# Patient Record
Sex: MALE | Race: WHITE | NOT HISPANIC OR LATINO | URBAN - METROPOLITAN AREA
[De-identification: names, ages, dates, MRNs, and addresses within clinical notes are randomized per-mention and may not be internally consistent; named-entity substitution may affect disease eponyms.]

---

## 2024-06-05 ENCOUNTER — VIRTUAL VISIT (OUTPATIENT)
Dept: PSYCHIATRY | Facility: CLINIC | Age: 19
End: 2024-06-05
Payer: COMMERCIAL

## 2024-06-05 DIAGNOSIS — F29 PSYCHOSIS, UNSPECIFIED PSYCHOSIS TYPE (H): Primary | ICD-10-CM

## 2024-06-05 NOTE — PROGRESS NOTES
[facility] Clinician Phone Screen  A Part of the [facility] First Episode of Psychosis Program    Patient: Yoseph Grayson (2005, 18 year old)     MRN: [id number]  Date:  6/05/24  Clinician: OMID Darden     Length of Actual Contact: Start Time: 3:39; End Time: 4:10    Use the following script to set-up intentions for this appointment:    You may have heard this when you scheduled this phone call but as a reminder, this 30 minute appointment is used to review a few questions to determine if you would be a candidate for our First Episode Psychosis Programs assessment process. Our assessment process includes 2-3 additional  appointments, spread out over several weeks. Eligibility for enrollment and our treatment recommendations will be discussed after those appointments. By the end of the phone call, I hope to determine if you/Pt is eligible for the full assessment appointment process, which we will schedule at the end of this call. This is not an intake and enrollment is not guaranteed.     We also want to be  transparent that start dates may vary depending on eligibility and program availability.   With knowing this information, do you still want to proceed with this phone screen? Yes    Reviewed limits to confidentiality: Yes    Use the following script to describe limits to confidentiality if meeting with the patient/family:   Before we get started I always like to review confidentiality. All of the information you share will be kept confidential. Only with permission will information be released to anyone outside of the organization except when required by law. Those legal exceptions are if there is clear and imminent danger to you or someone else, if there is a reasonable suspicion that child or elder is being abused, or if there is a court order. Do you have any questions about confidentiality before we get started?    Phone screen completed with:  Yoseph; Relation to the patient: Self  If we move  "forward with scheduling appointments, who should we coordinate appointments with? Yoseph, Phone: [phone]   Is it OK to leave a detailed voicemail? Yes  If we move forward with scheduling appointments via video, where would you like the link sent? [email or web address]    Demographics:   What is patient's phone number: [phone] (home)   Patient's Address?  [address] [address]  Patient's Email Address?  [email or web address]    If caller is not the patient, is the patient aware of the referral?  N/A    If age 18 or older, does the adult patient consent to this referral and is the patient willing to attend appointments? Yes    Diagnostic Information:  Briefly, in a few sentences, what would you/the patient like to be seen for?  \"I want to learn about what happened to me. I went through psychosis and was in a joanna.\"    Have you/the patient experienced symptoms of psychosis? Yes  If yes, for how long and please describe? \"Last month in May.\"  In particular, are you/the patient experiencing/have experienced any of the following?   -Changes in thinking (odd ideas, grandiosity, suspiciousness, difficulty concentrating): Yes \"I was paranoid and had weird ideas.\"   -Changes in perception (auditory/visual/tactile/olfactory abnormalities): Yes \"I thought that I could communicate with people telepathetically.\"  -Changes in speech (disorganized communication, tangential speech): No  -Emotional changes (depression, mood swings, irritability, flat affect): Yes  -Dramatic reduction of overall functioning: Yes    What mental health diagnosis(es) have you/the patient received in the past?   Bipolar Disorder  Depression    In particular, have you/the patient ever been diagnosed with:   -Autism spectrum disorder: No   -Borderline personality disorder: No    Any history of developmental delays?  No    If yes, please describe:     Are any of the following applicable to the patient?   -IQ below 70?  " "\"I've not had it tested.\"  -Non-verbal due to developmental delays? No  -Living in a group home because of developmental disability? No  -Has a guardian because of a developmental disability? No    Service History:   Are you/the patient taking antipsychotics or have you/the patient taken antipsychotics in the past? Yes            If yes, for how long cumulatively? Olanzapine- started last month    Are you/the patient seeing any mental health providers currently? Yes              If yes, who/where? Therapist- Marilyn Kirkpatrick; Psychiatrist- Dr. KYRA [facility]    Specifically, have you/the patient had an ACT team in the past?  No    Have you been enrolled in a first episode psychosis outpatient program before, such as Navigate or Strengths here, HOPE Program at [facility], or at the [facility] in [place]? No    Any current thoughts of harming yourself or others? Yes  Last few days SI- no plan, no intent; safety plan    What services are you/the patient interested in receiving in our clinics?   Medication management: Yes   Individual therapy: Yes   Family therapy: Yes   Group therapy: Yes   Work/school support: Yes    Research:  If talking with the patient directly, would you be interested in learning more about research opportunities you may qualify? If so, we can connect you with a team member for more information. No      Plan:  Is this patient eligible for a comprehensive assessment with First Episode of Psychosis Services? Yes    Yoseph would be eligible; however, he would like to get started with therapy as soon as possible and is not interested in waiting, due to going to school in August. This writer will send him a list of community psychosis providers.     Melody Hameed,  Thank you for talking with me today and for sharing your experiences. I'm sorry the timeline isn't going to work out for our programs, but I'm hopeful you will find another that will fit your needs. Below is a " "list of groups, individual therapists, and some reading resources for you to look through.    Psychosis Literature:  -YEVGENIY's Understanding Psychosis: Resources and Literature  https://6y650g02ecen8kltqf5y67zh-ylxghbju.ApplyInc.com/wp-content/uploads/sites/188/2020/07/YEVGENIY-TokyohxailowfHztubjylbYozbjgl9363.pdf     -Early Psychosis Intervention and other downloads  https://www.earlypsychosis.ca/resources-and-downloads/    Specifically, \"When Someone in Your Family has Psychosis\"      https://www.earlypsychosis.ca/downloads/1-EPI-Coping.pdf     -\"I'm Not Sick, I Don't Need Help.\" Book by Gato Parks  https://www.yevgeniy.org/getattachment/Learn-More/Mental-Health-Conditions/Related-Conditions/Anosognosia/I_am_not_sick_excerpt.pdf?lang=en-US     Computer Based Cognitive Remediation:  -BrainHQ, http://www.brainRemotemedical.Tranzlogic/  -Scientific Brain Training Bro, http://www.PeekapaktraAscentispro.Tranzlogic/  -Psychber Guide, http://psyberguide.org/, a consumer resource for mental health technology    Psychosis Groups & Other Support for Family & Caregivers:  -[facility] Youth and Parent , Rashmi Heredia, [facility]  [phone] ext [phone]    -[facility], Psychosis Family Education Group  Most Tuesdays 11:30-1  Please call [phone] for more information or with request to be added to the confidential email distribution list    -[facility] Caregiver & Family Support Group with emphasis on first episode of psychosis  2nd & 4th Monday of the month from 6:00-7:30pm  For more information please call Rashmi Heredia at [phone] ext [phone]    -FREE YEVGENIY classes (variety of topics, check back often)  (Understanding early psychosis, Hope for Recovery, Family to Family, etc)  https://[facility].org/education-and-public-awareness/classes/nlscc-wableg-ubz-education-and-public/-awareness-classes-all-scheduled-classes/    -YEVGENIY GORDON Family Online Support " Group  https://namimn.org/support/[facility]-support-groups/  For more information contact YEVGENIY at [phone]    And also a few webinars on the topic of supporting loved ones with psychosis to give you communication tools:  - https://uwspiritlab.org/family-caregiver-support-programs/  - https://AnSyn[facility] Medical Centerma.org/what-can-i-do/the-school-of-hard-talks-online-lessons-from-motivational-interviewing-for-everyday-families    Psychosis Groups for Individuals Experiencing or who Experienced Psychosis:  -[facility], Young Adult psychosis group  Thursday 3-4pm (virtually) [phone] to be scheduled    -Hearing Voices - Neibert Eldarion is a peer support group for anyone who experiences voice hearing, visions and other unusual or extreme experiences. Our group provides an opportunity to talk about these experiences freely and without judgment. We encourage anyone who has had these experiences to explore, understand, learn and grow from them in their own way.  https://hearingvoicestwincities.org   The group meets every Monday at 7:00 pm in the Meditation room at [facility] in [place]. Everyone is welcome.     -[facility] Vision of Wellness  [phone]   https://www.s-dbt.com/mental-health/vision-wellness-program    -[facility] Lili Jenkins PsyD, LP   Group therapy (http://www.psychrecoveryinc.com/group.html) for psychosis and schizophrenia   Address: [address] [place] [address] [address], [place], [place] [address]   Phone: [phone]     -[facility]: Young Adult Psychosis Intensive Outpatient Program (IOP)   Referral Form: [email or web address]://[email or web address]  Young Adult Psychosis - IOP is a 9-week intensive outpatient program for adults experiencing their first episode of psychosis. This program utilizes julien concepts from the NAVIGATE program, YEVGENIY, Sheron, and other evidence-based treatment modalities to work towards recovery after an  initial episode of psychosis. Young Adult Psychosis IOP focuses on reduction in symptoms, reduction in substance use, healthy lifestyle choices, and stress management. Clients use these skills to work towards individual goals, often returning to work or school and improving overall functioning. Our IOP program meets 4 days per week from 1:00 pm- 4:00 pm (Tuesday, Wednesday, Thursday, and Friday). In addition, clients participate in Individual therapy sessions on Mondays. This IOP is located at our [facility]. If you have any questions, please call [phone] Option 4 between the hours of 8:00am and 4:30pm CST Monday through Friday.    -Mental Health Resources  [place] Community Support Program (CSP) Drop in Team  Address: [address] [place], [place]  Phone: [phone]  http://www.[facility].org/[place]-community-support-program    -[facility] Support Groups for Individuals  Living with Mental Illness  https://[facility].org/support/support-groups-adults-living-mental-illness/  For more information contact YEVGENIY at [phone]      Psychosis Individual Therapy Referral(s):  Laverne Lane McMahon, [facility]  Address: [address] [place] [address], 2nd Floor, [place] [address]  Phone: [phone]    [facility]   Bin Martin [facility]  Julianne Ovalle MS, [facility]   Address: [address], [address] [address]   Phone: [phone]   *Updated 1/15/24     [facility]   Haydee Krishnan MA, JATIN Ricci, PhD, LP   Mary Colbert MA, LP   Julio Hernandez Psy.D., DAVID Ricci, Ph.D., LP    Address: [address] [address]   Phone: [phone]   *Updated 1/15/24 [facility]   Elmira Abernathy MA, [facility]  Brenden Reynolds MA, JATIN  Address: [address], [address], [place] [address]   Phone: [phone]   Website: [email or web address]   *Updated  1/15/24     [facility]  Ethel Mitzimaximo, [facility]   [email or web address]  Clinical Supervisor/Therapist   Direct #: [phone]   Fax #: [phone] [address], [address]  [place]. [address]  [email or web address]  *Updated 1/15/24    [facility], Behavioral Health Services   Website: http://[email or web address]/   To schedule your first appointment call [phone]   Locations/Phones:   - [place], Phone: [phone]   - [place], Phone: [phone]   - [place], Phone: [phone]   - [facility], Phone: [phone]   - [place], Phone: [phone]   [facility], Phone: [phone]     [facility]  Romy Stevens, LMFT  Daysi Larios, [facility]  [address] [place] [id number] [phone]  [email or web address]    [facility]  Bethel Szymanski, MS, LMFT   [address] [place] [address] [phone]  *Updated 1/15/24     Bin Warren  Marriage & Family Therapist, MA, LMFT  [address], [place] [address] [phone]  *Updated 1/15/24 [facility]  [phone]  Gigi Lassiter, PhD  Gabrielle Hernández, [facility]  Yancy Herbert LGTOM  [email or web address]/team-[email or web address]  [email or web address]://[email or web address]/team-[email or web address]    [facility] (need a /[facility] PCP)   Jeni Townsend PsyD,    Address: [place] [address], [address] [place] [address]   Phone: [phone]     [facility]   Damaris Dove, [facility]  [address], [address]  [place] [address]  Phone: [phone]  *Updated 1/15/24     [facility]   Address: [address] [facility], [place] [address]   Phone: [phone]   *Updated 1/15/24 [facility]   Address: [address] [place] [address], [place] [address]  Phone: [phone]  *Updated 1/15/24 [facility]    Romy Goff PsyD   *Children and adolescent   Address: [address], [place] [address]   Phone: [phone]     [facility] [place]  SABA Peterson  *also provides adherent DBT  Address: [address] [place], [place] [address]  Phone: [phone]  *Updated 1/15/24     Terry Padgett [facility]   *Individual and family   Location: [place], [place]   Phone: [phone]   *Updated 1/15/24 [facility] for Personal and Family Development   Xiomy Sharma [facility]  Location: [place], [place]   Main Phone: [phone]   Fee: accept most insurance plans   Website: https://www.[email or web address]/   *Updated 1/15/24 [facility], [facility]  Kapil Lowe, Psychologist, PhD, , [place] [address] [phone]  https://www.[facility].org/  *Updated 1/15/24 [facility]   Address: [address] [place] [address]   Phone: [phone]   *LISA Morejon, [facility] sees patient with SPMI in [place] (P: [phone])     [facility]  Mary Pelaez PsyD, [address], [address]  [place] [address]  Telephone: [phone]  Email: [email or web address]  [email or web address]  *Updated 1/15/24     [facility]  Edward William MA, [facility] sees patients ages 15+ with bipolar and schizophrenia  [address], [address]  [place] [address] [phone]  *Updated 1/15/24     [facility]   Address: [address], [address], [place] [address]   Phone: [phone]   *Updated 1/15/24     [facility]   Rebel Peralta LMFT   *Special interest in Psychosis   *Other therapists at [facility] [place] [facility] in [place] who specialize in psychosis   Address: [address], [address], [place] [address]   Phone: [phone]   *Updated 1/15/24 [facility]  Le FLOWER  Madison Carver, DAVID  [facility] [place] [address]  [address] [place] [place] [address]  Phone: [phone]  *Updated 1/15/24     [facility]   Lili Jenkins PsyD, DAVID   *Offers individual, family, and group therapy (http://www.psychrecoveryinc.com/group.html) for psychosis and schizophrenia   Address: [address] [place] [address], [facility] [address], [place], [place] [address]   Phone: [phone]   *Updated 1/15/24     [facility]  Shelby Oates MA [facility]  [place] [address] [phone]  *Updated 1/15/24 [facility]   Address: [address] [place] [address]   Phone: [phone]   *Updated 1/15/24     [facility]  Reginald Kellogg MA, LMFT  [address]  [place] [address]  [phone]  *Updated 1/15/24     [facility]  Maria R Cam[facility]  [address] [place] [address] [phone]  *Updated 1/15/24        Janneth Lora [facility]

## 2024-10-16 ENCOUNTER — VIRTUAL VISIT (OUTPATIENT)
Dept: PSYCHIATRY | Facility: CLINIC | Age: 19
End: 2024-10-16

## 2024-10-16 DIAGNOSIS — F29 PSYCHOSIS (H): Primary | ICD-10-CM

## 2024-10-17 NOTE — PROGRESS NOTES
Janneth CHAIREZ followed up with Yoseph to see if patient was still interested in moving forward. Phone screen is scheduled.

## 2024-10-22 NOTE — PROGRESS NOTES
This writer and Janneth Allred, , met with Yoseph for a phone screen. Yoseph stated he is taking a gap year from school and would therefore be interested in pursuing a FEP Program at this time. This writer verified his information from the previous phone screen in June 2024. This writer described the assessment process and sent him the following email:    Zina Hameed,     Thank you for your interest in our first episode of psychosis services. As discussed, it seems you might be eligible for one of our first episode of psychosis programs. Therefore, you were offered a series of assessment appointments (details below). Please note, enrollment in one of our first episode psychosis programs is dependent on the outcome of these assessments. These appointments are not considered an intake into a program and enrollment is not guaranteed. Additionally, if eligible there might be wait times for enrollment into our programs. Wait times would be discussed with you during your feedback appointment. If you already have established care with community providers, continue to work with those providers until you have appointments with our program. If you do not have care established elsewhere, please consider establishing care in the interim. If you requested information on other community resources outside of our organization, those are listed below.     First Episode Psychosis Assessment Appointments:     -Diagnostic Assessment appointment with Hanh Wilson and Russ Betancourt on 12/3/24 at 9:00 for 120 minutes in clinic. You will go to [address] [place] [address] in [place]. You can go to the 2nd floor and check in at the .  A diagnostic assessment is a comprehensive structured assessment that is completed with everyone seeking services in our clinic. It helps us get to know people and determine what services might be the best fit. For this appointment you will meet with an experienced clinician  and psychometrist (i.e., someone who administers questionnaires and structured assessments). During this appointment the assessment team will review your social, medical, and psychiatric history.     -Feedback appointment with Hanh Wilson for 30 minutes will be scheduled at your Diagnostic Assessment.   A feedback appointment is where you will hear about professional impressions and treatment recommendations.     In preparation for future appointments we ask that all behavioral health records be faxed to [phone] (for adult patients) / [phone] (for child/adolescent patients).    If you have follow-up questions or need to cancel/reschedule appointments, please contact one of our clinics at [phone] (for adult patients) / [phone] (for child/adolescent patients).    As a reminder, if you need urgent help, please call your local crisis number, 911, or go to your nearest ED. A list of crisis hotlines has been included below. Additionally, we have a new mental health emergency area at [facility] called Min that is very calming and helpful if you need additional support. ([address] [place] [address]  [phone]). This is a team of mental health providers who can assess your needs and connect you with resources and medication in a timely manner and provide transitional support until obtaining a consistent provider ([facility]- [phone]).    Crisis Resources:  Crisis Hotlines:  National Suicide Prevention Lifeline at 988  Throughout  [place]: call **CRISIS (**385368)  Crisis Text Line: is available for free, 24/7 by texting [place] to 693715  With Lifeline Chat as option - connect with a counselor for emotional support and other services via web chat https://suicidepreventionlifeline.org/chat/    [place] (Child & Adult): [phone]   [place] (Child & Adult): [phone]   [place] (Child & Adult): [phone]   [place] - Child: [phone]    [place] - Adult: [phone]  [place] - Child: [phone]   [place] - Adult: [phone]  [place] (Child & Adult): [phone]   [place] (Child & Adult): [phone]  [facility] Crisis Response ([facility] [place] and [facility]): [phone]    [facility] Crisis Services Fact Sheet: https://[facility].org/wp-content/uploads/sites/48/2019/06/Cibbcr-Uoypjo-Qayipodz__4431.06.03.pdf    Behavioral Health Emergency Room  [facility] EmPATH  Phone: [phone] (Emergency Room)  Address: [facility] [place] [place] [address]    [facility] EmPATH (Emergency Psychiatric Assessment, Treatment, and Healing) is like an emergency room mental health unit. EmPATH is an innovative approach to emergency mental health care that is designed to guide people safely through a crisis while helping them build coping skills for the future. The unit is designed for acute psychiatric patients to receive assessment and evaluation in a therapeutic and least restrictive setting.    Complementing the emergency department, the new adult EmPATH unit provides a calm and comforting environment, allowing the movement and human interaction that is vital in the first 24 hours of treatment. After a short medical evaluation in the emergency department, patients come to a calming, living room-style open space with comfortable recliners and self-serve refreshment stations. Open nursing stations and unlocked rooms create an atmosphere of trust and allyship with the staff, while the mix of daylighting, views to nature, or appropriate imagery establishes a sense of hope. In addition to providing a conducive environment for treatment, the EmPATH unit provides a gentle and benign setting for the care team to constantly evaluate a patient and discharge them with an appropriate treatment plan.    Thank you,  Janneth Lora

## 2024-11-10 ENCOUNTER — HEALTH MAINTENANCE LETTER (OUTPATIENT)
Age: 19
End: 2024-11-10

## 2024-12-03 ENCOUNTER — OFFICE VISIT (OUTPATIENT)
Dept: PSYCHIATRY | Facility: CLINIC | Age: 19
End: 2024-12-03
Payer: COMMERCIAL

## 2024-12-03 DIAGNOSIS — F31.9 BIPOLAR 1 DISORDER (H): Primary | ICD-10-CM

## 2024-12-03 NOTE — PROGRESS NOTES
"[facility]  Diagnostic Assessment  A part of the [facility] First Episode of Psychosis Treatment Programs    Yoseph Grayson MRN# [id number]   Age: 19 year old YOB: 2005        In Person visit details:  Date of Evaluation: 12/03/24  Location of Evaluation: [facility]  Start Time: 9:05am; End Time: 10:15am  People present:  Writer, Individual, Others: Hanh WALTER, psychometrist for measures and research      Contributors to the Assessment   Chart Reviewed.   Interview completed with Yoseph.    Diagnostic assessment today was completed by SABA Shepherd    Chief Complaint   Been diagnosed with Bipolar previously and was put on Zoloft triggered manic episode. \"I want to learn about what happened to me. I went through psychosis and was in a joanna.\"     History of Present Illness    Yoseph Grayson is a 19 year old patient who prefers the name Yoseph and uses pronouns he, they. Yoseph presents for evaluation at [facility] for services to treat first episode psychosis.  Discussed limits of confidentiality today and status as a mandated .     Referred by:  Therapist    Patient attended the session alone, patient provided assessment details, they were a good historian.     Yoseph attributes symptoms to \"Bipolar disorder and manic episode\"    Per medical records:   The patient has not had a psychiatric history prior to his freshman year in high school when he had an unusual \"out of body\" feeling.  This occurred in approximately 2019 and was treated with fluoxetine.  He may have taken that for 1 or 2 years but it stopped before graduation.  He graduated from high school in 2022 and attended the [employer] for his freshman year before transferring to the [employer] beginning in the fall 2023.  The patient stated that he had begun smoking cannabis heavily in the summer 2023 and reportedly was continuing to do so this year.   "   Approximately 1 month ago, over spring break, the patient was at home and he was noted to be very tearful.  His father accompanied him back to [place] and the patient was able to see a clinician and was started on sertraline.  He took 25 mg/day for 1 week then increase to 50 mg/day.  Family noted that over the next 2 weeks that he seemed to be back to his old self.     Beginning April 27, the patient had contacted his family late at night and announced that he was chin.  He was described as being hyper and excitable.  He had been posting inappropriate things on social media.  On the morning of the 28th he was described as being irritable towards his parents.  During a class on Tuesday he had a outburst and announced that if he had learned this information earlier that he would not have been repressed.  He reportedly had used edibles containing THC or delta a by his report.      When Darien was brought in for hospitalization the following events lead up to his admission. Darien had become fixated on a fellow student, Cielo and had been sending her text messages stating that her boyfriend was dead and that he had killed him. He varied his responses stating that boyfriend was not dead but was still alive. It was very concerning. He had also defecated on his roommate's bed and the roommate had notified authorities. On the morning of the second the patient talked loudly on Cielo's door and demanded to be let in. She contacted law enforcement and the patient was brought to health services on campus. Family was contacted and the patient was brought to the [facility] and [place]. He was subsequently referred for inpatient evaluation.     When asked about manic episode, Yoseph clearly states that his psychosis symptoms have only happened during his Joanna and they faded prior to his joanna subsiding. Yoseph has never had psychosis apart from his joanna and believes it is connected with his mood.        Social History:    Living situation: Yoseph currently lives in [place] with parents. Yoseph reports home life is going well.  Housing Type: house with parents  Pets at home? No   Guns, weapons, or other means to harm oneself in the home?  Unknown    Relationships: Significant relationships include Parents and friend Brigido. Feels like he is struggling with intimate relationships. Is struggling with loneliness.     Education: Yoseph's highest level of education is high school graduate. Was going to school for Music Education but switched to Elementary Education. Educational goals include finishing elementary education degree and going to school in [place].    Occupation: .  Occupational goals include to get an additional job and to eventually go back to school for elementary education.    Finances: Yoseph is financial supported by Payroll and Family. Patient states he is not good with money.     Spiritual considerations: Patient does not identify with pedro luis community.  Is spiritual but is not as into it as he used to be.    Cultural influences: Yoseph describes their race as white and   [place]. Yoseph's primary spoken language is English. Yoseph identifies their sexual orientation as questioning, and their gender as male. Yoseph prefers he  they pronouns.     Current Stressors: Living at home with parents, feelings of loneliness and depression.    Strengths & Opportunities:  Hobbies and enjoyable activities include Reading and Listening to Music. Spending time outside and playing video games. Likes to read comic books. Self identified strengths are good with people and social. Exercise and nutrition habits include worries about gaining weight from the Wellbutrin.  Coping mechanisms include Meditation, Therapy, and Family.     Legal Hx: Yes: got caught with an edbile and had to do a class online      Trauma and/or Abuse Hx: There are no indications or report of: significant losses, trauma,  abuse or neglect. Issues of possible neglect are not present. Identifies the hospital as a traumatic experience.     Hx: No       Developmental History:   Yoseph was born without pregnancy or delivery complications.  Complications include none.   Yoseph denies in utero substance exposure.   Yoseph  did not meet developmental milestones on time.   Milestones not met include struggled with speech.   Developmental disabilities include: None.    Yoseph did not receive interventions for developmental delays.   Yoseph  did not require an IEP/504 Plan during school.   Interventions include none.          Family History:   Family history of: Mother eating disorder, anxiety and depression  reports history of completed suicides. Uncle completed suicide.    Past Psychiatric History   Past diagnoses: Depression, Anxiety, Bipolar  Past medication trials: Zoloft, Prozac, Wellbutrin    Psychiatric Hospitalizations: One hospitalization in May with Luz Elena  Commitment: No, Current Castillo order: No  History of Electroconvulsive Therapy (ECT) or Transcranial Magnetic Stimulation (TMS): No    Self-Injurious Behavior: Denies  Suicidal Ideation Hx: Yes - has history of ideation but has called suicide hotline  Suicide Attempt- #-:No  Violence/Aggression Hx: No    Outpatient Programs & Services [Psychotherapy, DBT, Day Treatment, Eating Disorder Tx, etc]:   Current:  Currently has an outpatient therapist. Cm Don [facility] PCP at [facility]    Past:  Has had previous therapists.Had previous hospitalization for manic episode.         Past Medical History:    There is no problem list on file for this patient.      Primary Care Physician: Jone Don  Last PCP Appointment Date: 11/06/2024    Medical problems: No  Surgical history: This patient has no significant past surgical history  History of seizures/head trauma/loss of consciousness? Yes Without loss of consciousness, Occurred: 2016, and Treatment received: went to the  hospital  Allergies: No Known Allergies     Substance Use History (review CAGE-AID):   Caffeine: 1-2 cups/day of coffee       Tobacco: none   Age of first tobacco use: No use    Amount of tobacco used per week: No use   Frequency of use over the last 6 months: No use    ETOH: occasional     Age of first alcohol use: Unknown   Number of days patient drank over the last 30 days: None   Number of drinks patient had per day over the last 30 days: 1-2 drinks every 2 weeks   Frequency of use over the last 6 months: Once every 2 weeks    Cannabis: none           Age of first cannabis use: Unknown   Number of days patient used cannabis over the last 30 days: none   Amount of cannabis used per use: none   Frequency of use over the last 6 months:None  Last summer used every night (2023) has insight into Marijuana use and how this potentially could effect psychosis.   Has been abstaining from Marijuana to prevent psychosis.    Other Drugs: none   Age of first other drug use: N/A   Number of days patient used other drugs over the last 30 days: none   Frequency of use over the last 6 months: none    CD treatment hx: Yoseph has not received chemical dependency treatment in the past. Yoseph reports no problems as a result of their drinking / drug use.     Current sober supports include family and friends.    Based on the clinical interview, there are not indications of drug or alcohol abuse. Continue to monitor.   Discussed effect of substance use on overall health and how this may contribute to their mental health symptoms.     Psychiatric Review of Systems (Completed M.I.N.I. for Psychotic Disorders: Yes)     DEPRESSION  Past 2 Weeks:  low mood nearly every day and anhedonia most of the time, appitate increase, sleeping excessively, tired or without energy, feeling worthless and without energy, everyday, Feeling guilty about being depressed, suicidal ideation,  Past Episode:  low mood nearly every day, anhedonia most of the  "time, appetite change (increase), difficulties with sleep, low energy, worthlessness and/or guilt, and suicidal ideation without plan, without intent  Notes: first diagnosed with depression in 2017 with no medication. Started Prozac in 2020. Fall of 2023 stopped prozac. Started Zoloft in 2024 and caused salma. Wellbutrin currently from 4-5 months. Has had periods where he is not depressed for 2 months.     SUICIDALITY: Current (within last month): No, risk Low  -reports 0% in response to \"How likely are to you to try to kill yourself within the next 3 months on a scale from 0-100%?\"  -Never has had a suicide attempt  -denies current SI, denies intent and plan  -denies current SIB/Self Injurious Behavior  -denies current HI  -denies past HI  -See Crisis and safety planning below for more information    SALMA/HYPOMANIA  Current Episode:  none  Past Episode:  elevated mood/energy, need less sleep, pressured speech, racing thoughts, and increased drive  Lasting over 7 days for an episode and hospitalized. 3 hours of sleep a night, maybe less.    PANIC:  none    AGORAPHOBIA:  none    SOCIAL ANXIETY:  marked fear/anxiety in participating in small groups and dating out of fear that he/she will act in a way or show anxiety symptoms that will be negatively evaluated, almost always, and causing clinically significant distress or impairement in social, occupation, or other important areas of functioning     OBSESSIVE-COMPULSIVE:  none    TRAUMA:  none    ALCOHOL & J. NON-ALCOHOL:  See below    PSYCHOSIS:   Present Symptoms:  none  Past Symptoms:  paranoia, delusions, thought broadcasting, mind reading, delusions of control, odd beliefs per family/friends, auditory hallucinations, and visual hallucinations  Onset: May 2024   Examples include:   -Paranoia/Suspiciousness: Thought the Pingpigeon was after him   -Delusions Thoughts: thought that he could fly  -Thought Broadcasting: thought he could talk to them through his mind. " Was actually texting people.  -Mind Reading: yes  -Thought Insertion: reports none  -Delusions of Control: reports ability to read thoughts when in psychosis not current  -Ideas of Reference: none  -AH: Multiple voices talking to each other  -VH: none  -Other Hallucinations: none  -Disorganized Speech: none  -Disorganized Behavior: none  -Cognitive Difficulties: none  -Somatic Complaints: none    EATING DISORDER: none    GENERALIZED ANXIETY:  excessive anxiety or worry about several routine things, most days, feel restless, keyed up or on edge, easily tired, weak or exhausted, irritability, and difficulty sleeping    RULE OUT MEDICAL, ORGANIC OR DRUG CAUSES FOR ALL DISORDERS  During any current disorder or past mood episode, patient reports:  A. Substance use or withdrawal: No  B. Medical illness: No    ANTISOCIAL PERSONALITY:  none   Other Cluster B Traits:  none         Medications:   Per chart:  Current Outpatient Medications   Medication Sig Dispense Refill    NO ACTIVE MEDICATIONS       penicillin V potassium (VEETID) 250 MG/5ML suspension Take  by mouth 4 times daily. 375mg four times daily for 10 days 400 mL 0       Most Recent Labs & Vitals (per EPIC):   There were no vitals taken for this visit.    RECENT BRAIN IMAGING:  No recent brain imaging was completed.    Additional Screening / Assessment Measures   PHQ9 was completed today, 12/03/24  GAD7 was completed today, 12/03/24  CAGE-AID was not completed today, 12/03/24  None of the patient's responses to the CAGE screening were positive / Negative CAGE score     Mental Status Exam   Alertness: alert  and oriented  Attention Span and Concentration:  Normal  Appearance: adequately groomed, appeared stated age, and casually dressed  Behavior/Demeanor: cooperative, pleasant, and calm, with adequate and good eye contact   Speech: normal  Language: intact. Preferred language identified as English.  Psychomotor Behavior:  restless, sits forward or near front of  chair, and fidgety  Mood: depressed and anxious  Affect: appropriate and in normal range; was congruent to mood; was congruent to content  Associations:  no loose associations  Thought Process:  unremarkable  Thought Content:  no evidence of suicidal ideation or homicidal ideation, no auditory hallucinations present, and no visual hallucinations present  Perception: none  Insight: excellent  Judgment: good  Impulse Control:  intact  Cognition: does  appear grossly intact; formal cognitive testing was not done    Safety: There are notable risk factors for self-harm, including age, anxiety, psychosis, family history, and suicidal ideation. However, risk is mitigated by commitment to family, sobriety, absence of past attempts, history of seeking help when needed, future oriented, and denies suicidal intent or plan. Therefore, based on all available evidence including the factors cited above, Yoseph does not appear to be at imminent risk for self-harm, does not meet criteria for a 72-hr hold, and therefore remains appropriate for ongoing outpatient level of care.  Suicidality risk appeared Low.  The patient convincingly denies suicidality on several occasions. There was no deceit detected, and the patient presented in a manner that was believable.    Safety plan was discussed and included review of crisis phone numbers. Recommended that patient call 911 or go to the local ED should there be a change in any of these risk factors.    CRISIS NUMBERS Emphasized:  [facility] [phone] (clinic)    [phone] (after hours), Calling [phone] to access [facility] crisis teams. Patient and/or family were agreeable to utilizing these resources and or call 911 or go to local ED in case of crisis. Provider discussed limits to crisis response as outpatient providers.    National Suicide Prevention Lifeline: 7-275-991-TALK (491-458-7080)  COPE 24/7 [place] Mobile Team -[phone] (adults)/ [phone] (child)  Poison Control  Center - 8-740-207-5242    OR  go to nearest ER  Crisis Text Line for any crisis 24/7 send this-   To: 992318   [facility] ([facility]) [facility]  [phone]  OTHER 988  Trans Lifeline a hotline for transgender people 1-127.614.5075  The [employer] a hotline for LGBT youth 1-359.682.5902  Walk-In Counseling [facility]     [phone]      Provisional Psychiatric Diagnoses   Bipolar I with psychotic features, 296.44 (F31.2)    Assessment   Yoseph is a 19 year old single White Not  or  male with psychiatric history of Depression, Anxiety and Bipolar who presented for an assessment of psychiatric symptoms by the First Episode of Psychosis program.  Yoseph was referred by his Therapist.   He has a history of one psychiatric hospitalization.  Family history is significant for eating disorders, depression, anxiety and an uncle who completed suicide.      Today, Yoseph presents as a an excellent historian with good insight in their current circumstances. Prodromal symptoms seem to have been present since May of 2024, and included depression symptoms (low mood, low energy, trouble sleeping).  Yoseph reports first onset of psychiatric symptoms at age 18, and psychotic symptoms at age 18, 7 months prior to today's assessment.  The above duration of untreated psychosis was approximately 7 months.  Yoseph attributes symptoms to previous bipolar disorder and lack of social interaction.  Precipitating factors to aforementioned symptoms seem to be stress from music education school and from launching and living on his own , whereas perpetuating factors are comprised of continued feelings of worthlessness and guilt.  Substance use does not seem to be a present concern. He does admit the aforementioned symptoms are worse with substance use. Timeline of substance use and symptom presentation has been established as stated above.    Yoseph s reported symptoms of psychosis could be  consistent with an episode of major depression with psychotic features, bipolar disorder with psychotic features, schizoaffective disorder or a manifestation of positive/negative symptoms in a schizophrenia spectrum disorder.  A substance induced component is also possible given history of marijuana use. As this is reportedly Yoseph s first psychotic episode and he is unable to give a clear history, more time and information is required to distinguish between these conditions. Diagnosis of Bipolar 1 seems supported by patient report, collateral records, and the MINI assessment tool. Further diagnostic clarification is not needed.  There are no medical comorbidities which impact this treatment.    Yoseph has notable strengths, including high intelligence, high academic achievement, emotional intelligence, good social skills, capacity for insight, motivation for treatment, strong engagement in health care, proven resilience through adversity, opportunities to live a meaningful life, optimism that change can occur, community involvement, and good coping skills. Due to these strengths, I feel optimistic that Yoseph will have a positive treatment outcome and I think Yoseph has the potential to find mental well-being.  Psychosocial factors impacting treatment include relationship stress.  Yoseph  has evidence of functional impairment including difficulty with work, social-strangers, social-friends, community activities, education, and money management.  Goal is to increase their functioning detailed above and assist Yoseph to make progress towards their goals.  Yoseph identified the following factors that will help them succeed in recovery include friends / good social support, family support, insight, intelligence, motivation, and work ethic.     Yoseph may meet criteria for the psychosis services offered through [facility]. This writer will provide verbal and/or written information about recommended  "services and programs in the context of treating psychosis during our feedback session.     Safety: Please refer to mental status exam for safety concerns and/or plan.    Yoseph agrees to treatment with the capacity to do so. Agrees to call clinic for any problems. The patient understands to call 911 or come to the nearest ED if life threatening or urgent symptoms present. Please note, writer did not receive all pertinent medical records as of the time of this assessment. Yoseph did not sign NATHAN's for additional records.    Billing for \"Interactive Complexity\"?    No    Plan   Next steps include intention of completing a continued multi-disciplinary assessment utilizing today's evaluation. The expertise of a PharmD, Psychologist, and Psychiatric consultation may be next steps. Informed patient that if deemed appropriate for the First Episode of Psychosis services, care will be provided with goal of reducing distressing symptoms and improving functional recovery.    Medication Management: Yoseph is in need of Medication Management.  Medications will be addressed further during an MTM visit and new patient medication evaluation.  Continue to follow recommendations of current outpatient prescriber until recommendations are provided.     Therapy: Yoseph was interested in meeting with a therapist. Yoseph would benefit from therapy.   Yoseph was and Family was interested in participating in the Family PsychoEducation Program.     Supported Employment & Education: Yoseph is not in need of employment and education support. 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 Potentially is looking for an additional job.    Case Management: Yoseph is not followed by a .  Case Management is not an identified need at this time. This writer will assist in short-term case management support as needed until care is established with ongoing providers.     Other Psychosocial Supports: Yoseph is interested " in the [facility].  Family would benefit from [facility] Family Psychoeducation & Support Group.    Referral information for the above mentioned supports will be discussed further at our upcoming feedback visit.   Without the recommended intervention, Yoseph is likely to experience possible increase in psychotic symptoms requiring hospitalization.     TREATMENT RISK STATEMENT:  The risks, benefits, alternatives and potential adverse effects have been discussed and are understood by the pt. The pt understands the risks of using street drugs or alcohol. There are no medical contraindications, the pt agrees to treatment with the ability to do so. The pt knows to call the clinic for any problems or to access emergency care if needed.  Medical and substance use concerns are documented above.       PROVIDER: SABA Shepherd  SUPERVISOR: OMID Garcia

## 2024-12-03 NOTE — PROGRESS NOTES
"[facility]  Diagnostic Assessment  A part of the [facility] First Episode of Psychosis Treatment Programs    Yoseph Grayson N# [id number]   Age: 19 year old YOB: 2005        In Person visit details:  Date of Evaluation: 12/03/24  Location of Evaluation: ***  People present:  Writer, Individual, Others: ***, psychometrist for measures and research  Start Time: ***; End Time: ***    Video- Visit Details  Type of service:  video visit for Diagnostic Assessment  Time of service:  Date:  12/03/24  Video Start Time:  ***      Video End Time:  ***  People present:  Writer, Individual, Others: ***, psychometrist for measures and research    Reason for video visit:  To reduce barriers in accessing services, due to but not limited to transportation, location, or scheduling reasons.  Originating Site (patient location):  [facility]   Location- {pt:177215}  Distant Site (provider location):  HIPAA compliant location- {pv:922334}   Mode of Communication:  Secure real time interactive audio and visual telecommunication system via {Virtual Visit Platforms:285700::\"AmWell\"} (HIPAA compliant, secure platform)    Consent:  Patient has given verbal consent for video visit?: Yes    Contributors to the Assessment   Chart Reviewed.   Interview completed with Yoseph.  Releases of information signed by Yoseph for ***.  Consent to communicate signed/verbally approved for ***.  Collateral information obtained from ***.    Diagnostic assessment today was completed by SABA Shepherd    Chief Complaint   ***    History of Present Illness    Yoseph Grayson is a 19 year old patient who prefers the name *** and uses pronouns {:676663}. Yoseph presents for evaluation at [facility] for services to treat first episode psychosis.  Discussed limits of confidentiality today and status as a mandated .     Referred by:  {psyreferredby:123114}  ***  Patient attended the session {[facility] ATTENDANCE:045334}, {PATIENT. " "FAMILY:809619} provided assessment details, they were a {PSYCHISTORIAN:154629} historian.     Per patient's report:  ***  Yoseph attributes symptoms to \"***\"    Per Collateral report:   ***     Per medical records:   ***         Social History:    Living situation: Yoseph currently lives in ***. Yoseph reports home life is ***  Housing Type: ***  Pets at home? {YES/NO :943433::\"No\"}   Guns, weapons, or other means to harm oneself in the home? {Gun or other weapon:214788}    Relationships: Significant relationships include ***.       Education: Yoseph's highest level of education is {[facility] EDUCATIONAL LEVEL:943304}. ***. Educational goals include ***.    Occupation: ***.  Occupational goals include ***.    Finances: Yoseph is financial supported by {Sources of Income:300629513}. ***    Spiritual considerations: {Values/Spirituality OCCUPATIONAL PROFILE:659065}.  ***.    Cultural influences: Yoseph describes their race as ***. Yoseph's primary spoken language is {LANGUAGES SPOKEN:283361}. Yoseph identifies their sexual orientation as {ORIENTATION6:853100}, and their gender as male. Yoseph prefers {[facility] Psych Gender Options:507724954} pronouns. Cultural considerations to take into account when providing treatment include ***.    Current Stressors: ***    Strengths & Opportunities:  Hobbies and enjoyable activities include ***.  Self identified strengths are ***. Exercise and nutrition habits include ***.  Coping mechanisms include {[facility] COPE:418421}.     Legal Hx: {[facility] YES/NO LEGAL:641117}     Trauma and/or Abuse Hx: There are {Losses:920527}. Issues of possible neglect {Present:872003}.      Hx: {YES/NO :955646::\"No\"}       Developmental History:   Yoseph was born {w-w/o:590214} pregnancy or delivery complications.  Complications include ***.   Yoseph {DENIES:80039::\"denies\"} in utero substance exposure.   Yoseph  {DID/NOT:709267} meet developmental milestones on time.   Milestones not " "met include ***.   Developmental disabilities include: {DEVELOPMENTAL DISABILITIES:144950}.    Yoseph {DID/NOT:166836} receive interventions for developmental delays.   Yoseph  {DID/NOT:935420} require an IEP/504 Plan during school.   Interventions include ***.          Family History:   Family history of: ***  {DENIES:31819::\"denies\"} history of completed suicides.    Past Psychiatric History   Past diagnoses: ***  Past medication trials: ***    Psychiatric Hospitalizations: ***  Commitment: {YES/NO :992986::\"No\"}, Current Castillo order: {YES/NO :672761::\"No\"}  History of Electroconvulsive Therapy (ECT) or Transcranial Magnetic Stimulation (TMS): {YES / NO:593268::\"No\"}    Self-Injurious Behavior: {[facility] SIB:241355::\"Denies\"}  Suicidal Ideation Hx: {YES/NO :062692::\"No\"}  Suicide Attempt- #-:{YES/NO :767844::\"No\"}, most recent-***  Violence/Aggression Hx: {YES/NO :941493::\"No\"}    Outpatient Programs & Services [Psychotherapy, DBT, Day Treatment, Eating Disorder Tx, etc]:   Current:  ***    Past:  ***         Past Medical History:    There is no problem list on file for this patient.  Primary Care Physician: [facility]  Last PCP Appointment Date: ***    Medical problems: {YES/NO :669497::\"No\"}  Surgical history: { :3603620}  History of seizures/head trauma/loss of consciousness? {[facility] YES/NO HEAD INJURY:896309}  Allergies: No Known Allergies     Substance Use History (review CAGE-AID):   Caffeine: {:986001}       Tobacco: {NONE DEFAULTED:091991::\"none\"}   Age of first tobacco use: ***   Amount of tobacco used per week: ***   Frequency of use over the last 6 months: ***    ETOH: {ALCOHOL:398305074}     Age of first alcohol use: ***   Number of days patient drank over the last 30 days: ***   Number of drinks patient had per day over the last 30 days: ***   Frequency of use over the last 6 months: ***    Cannabis: {:309760}           Age of first cannabis use: ***   Number of days patient " "used cannabis over the last 30 days: ***   Amount of cannabis used per use: ***   Frequency of use over the last 6 months: ***    Other Drugs: {drugs:461813}   Age of first other drug use: ***   Number of days patient used other drugs over the last 30 days: ***   Frequency of use over the last 6 months: ***    CD treatment hx: Yoseph {[facility] RECEIVED CHEMICAL DEPENDENCY TREATMENT:277404}. Yoseph {[facility] DRINKING PROBLEMS:077461}.     Current sober supports include {:350011}.    Based on the clinical interview, there {Indications:898434}. Continue to monitor.   Discussed effect of substance use on overall health and how this may contribute to their mental health symptoms.     Psychiatric Review of Systems (Completed M.I.N.I. for Psychotic Disorders: {YES/NO:105383})     DEPRESSION  Past 2 Weeks:  {MINI DEPRESSION:109392}  Past Episode:  {MINI DEPRESSION:591337}  Notes: ***    SUICIDALITY: Current (within last month): {YES/NO:803415}, risk {LOW, MEDIUM, HIGH:052558}  -reports ***% in response to \"How likely are to you to try to kill yourself within the next 3 months on a scale from 0-100%?\"  -{DENIES:50079::\"denies\"} current SI, {DENIES:37181::\"denies\"} intent and plan  -{DENIES:69121::\"denies\"} current SIB/Self Injurious Behavior  -{DENIES:25787::\"denies\"} current HI  -{DENIES:63252::\"denies\"} past HI  -See Crisis and safety planning below for more information    SALMA/HYPOMANIA  Current Episode:  {MINI SALMA:552745}  Past Episode:  {MINI SALMA:752150}    PANIC:  {MINI PANIC:180060}    AGORAPHOBIA:  {MINI AGORAPHOBIA:660332}    SOCIAL ANXIETY:  {MINI SOCIAL ANXIETY:780252}    OBSESSIVE-COMPULSIVE:  {MINI OCD:682600}    TRAUMA:  {MINI TRAUMA:654184}    ALCOHOL & J. NON-ALCOHOL:  See below    PSYCHOSIS:   Present Symptoms:  {MINI PSYCHOSIS:790796}  Past Symptoms:  {MINI PSYCHOSIS:189169}  Onset: ***   Examples include:   -Paranoia/Suspiciousness: ***  -Delusions Thoughts: ***  -Thought Broadcasting: ***  -Mind Reading: " ***  -Thought Insertion: ***  -Delusions of Control: ***  -Ideas of Reference: ***  -AH: ***  -VH: ***  -Other Hallucinations: ***  -Disorganized Speech: ***  -Disorganized Behavior: ***  -Cognitive Difficulties: ***  -Somatic Complaints: ***    EATING DISORDER: {MINI EATING DISORDER:405820}    GENERALIZED ANXIETY:  {MINI TAURUS:018531}    RULE OUT MEDICAL, ORGANIC OR DRUG CAUSES FOR ALL DISORDERS  During any current disorder or past mood episode, patient reports:  A. Substance use or withdrawal: {YES/NO:263280}  B. Medical illness: {YES/NO:246092}    ANTISOCIAL PERSONALITY:  {MINI ANTISOCIAL:725409}   Other Cluster B Traits:  {CLUSTER B:670626}         Medications:   Per chart:  Current Outpatient Medications   Medication Sig Dispense Refill    NO ACTIVE MEDICATIONS       penicillin V potassium (VEETID) 250 MG/5ML suspension Take  by mouth 4 times daily. 375mg four times daily for 10 days 400 mL 0       Most Recent Labs & Vitals (per EPIC):   There were no vitals taken for this visit.    RECENT BRAIN IMAGING:  ***    Additional Screening / Assessment Measures   PHQ9 {WAS / WAS NO:035715} completed today, 12/03/24  {Last PHQ9 or GAD7 Responses (Optional):431980}    GAD7 {WAS / WAS NO:185046} completed today, 12/03/24  {Last PHQ9 or GAD7 Responses (Optional):622673}    CAGE-AID {WAS / WAS NO:265501} completed today, 12/03/24  {CAGE SCREEN FOR ETOH ABUSE:496361}     Mental Status Exam   Alertness: {ALERTNESS DESCRIPTION:216977}  Attention Span and Concentration:  { :584721}  Appearance: {[facility] APPEARANCE:164997}  Behavior/Demeanor: {BEHAVIOR Description:109493}, with {[facility] EYE:461520} eye contact   Speech: {SPEECH Description:563983}  Language: {LANGUAGE Description:134486}. Preferred language identified as {LANGUAGES SPOKEN:170450}.  Psychomotor Behavior:  {p:252401}  Mood: {m:413771}  Affect: { :930053}; {was:655851} congruent to mood; {was:830605} congruent to content  Associations:  { :985171}  Thought Process:   {THOUGHT PROCESS Description:594105}  Thought Content:  {[facility] TC:564755}  Perception: {p:645127}  Insight: {INSIGHT Description:437480}  Judgment: {JUDGMENT Description:150511}  Impulse Control:  { :441819}  Cognition: {Does:871895} appear grossly intact; formal cognitive testing {was:086256} done    Safety: There are notable risk factors for self-harm, including {[facility] SUICIDE RISKS:256356}. However, risk is mitigated by {[facility] SUICIDE PROTECT:629914}. Therefore, based on all available evidence including the factors cited above, Yoseph does not appear to be at imminent risk for self-harm, does not meet criteria for a 72-hr hold, and therefore remains appropriate for ongoing outpatient level of care.  Suicidality risk appeared {LOW, MEDIUM, HIGH:964135}.  The patient convincingly denies suicidality on several occasions. There was no deceit detected, and the patient presented in a manner that was believable.    Safety plan {WAS / WAS NO:318118} discussed and included review of crisis phone numbers. Recommended that patient call 911 or go to the local ED should there be a change in any of these risk factors.    CRISIS NUMBERS Emphasized:  [facility] [phone] (clinic)    [phone] (after hours), Calling 988 to access [facility] crisis teams. Patient and/or family were agreeable to utilizing these resources and or call 911 or go to local ED in case of crisis. Provider discussed limits to crisis response as outpatient providers.    National Suicide Prevention Lifeline: 1-861-436-TALK (801-388-8634)  COPE 24/7 [facility] Mobile Team -[phone] (adults)/ [phone] (child)  Poison Control Center - [phone]    OR  go to nearest ER  Crisis Text Line for any crisis 24/7 send this-   To: 727759   [facility] ([facility]) [facility]  [phone]  OTHER 987  Trans Lifeline a hotline for transgender people 1-171.708.1689  The [employer] a hotline for LGBT youth 1-751.495.4670  Walk-In Counseling  "[facility]     [phone]      Provisional Psychiatric Diagnoses   ***  {NAVIGATE DIAGNOSES:384111}    Additionally:   ***      Assessment   Yoseph is a 19 year old {[facility] RELATIONSHIP STATUS:224913} White Not  or  male with psychiatric history of *** who presented for an assessment of psychiatric symptoms by the First Episode of Psychosis program.  Yoseph was referred by ***.   He has a history of *** psychiatric hospitalization(s).  Family history is significant for ***.      Today, Yoseph presents as a {historian:944915} with {OP BEH INSIGHT 2:032701} insight in their current circumstances. Prodromal symptoms seem to have been present since ***, and included ***.  Yoseph reports first onset of psychiatric symptoms at age ***, and psychotic symptoms at age ***, *** months prior to today's assessment.  The above duration of untreated psychosis was approximately ***.  Based on today's assessment past symptoms of mental illness include ***. Current presenting symptoms appear to include ***. Yoseph attributes symptoms to ***.  Precipitating factors to aforementioned symptoms seem to be ***, whereas perpetuating factors are comprised of ***.  Substance use {DOES/DOES NOT:163450::\"does not\"} seem to be a present concern. He {DOES/DOES NOT:514488::\"does not\"} admit the aforementioned symptoms are worse with substance use. Timeline of substance use and symptom presentation has been established as stated above.    Yoseph s reported symptoms of psychosis could be consistent with an episode of major depression with psychotic features, bipolar disorder with psychotic features, schizoaffective disorder or a manifestation of positive/negative symptoms in a schizophrenia spectrum disorder.  A substance induced component is also possible given history of *** use. As this is reportedly Yoseph s first psychotic episode and he is unable to give a clear history, more time and information is required to " "distinguish between these conditions. Diagnosis of *** seems supported by patient report, collateral records, and the MINI assessment tool.  Differential diagnosis of ***.  Further diagnostic clarification {:305661} needed.  There {:146362} medical comorbidities which impact this treatment [{MEDICAL only or DELETE:297136}] and should continue to be monitored.     Yoseph has notable strengths, including {Strengths:885644}. Due to these strengths, {Reactions to strengths:560379} and {Reactions to strengths:915130}.  Psychosocial factors impacting treatment include {:671802}.  Yoseph  has evidence of functional impairment including difficulty with {Functional Impairment:249200}. More specifically, ***.  Goal is to increase their functioning detailed above and assist Yoseph to make progress towards their goals.  Yoseph identified the following factors that will help them succeed in recovery include {FCC SUCCEED:334325}. Things that may interfere with their success include {Vulnerabilities:706289}.     Yoseph may meet criteria for the psychosis services offered through [facility]. This writer will provide verbal and/or written information about recommended services and programs in the context of treating psychosis during our feedback session.     Safety: Please refer to mental status exam for safety concerns and/or plan.    Yoseph agrees to treatment with the capacity to do so. Agrees to call clinic for any problems. The patient understands to call 911 or come to the nearest ED if life threatening or urgent symptoms present. Please note, writer {DID/NOT:949158} receive all pertinent medical records as of the time of this assessment. Yoseph {DID/NOT:654991} sign NATHAN's for additional records.    Billing for \"Interactive Complexity\"?    Yes  {Yes - Interative Complexity:332239032}    Plan   Next steps include intention of completing a continued multi-disciplinary assessment utilizing today's evaluation. The " "expertise of a PharmD, Psychologist, and Psychiatric consultation may be next steps. Informed patient that if deemed appropriate for the First Episode of Psychosis services, care will be provided with goal of reducing distressing symptoms and improving functional recovery.    Medication Management: Yoseph {IS NOT/IS:465108::\"is not\"} in need of Medication Management.  Medications will be addressed further during an MTM visit and new patient medication evaluation.  Continue to follow recommendations of current outpatient prescriber until recommendations are provided.     Therapy: Yoseph {WAS / WAS NO:308892} interested in meeting with a therapist. Yoseph would benefit from therapy.   Yoseph {WAS / WAS NO:649039} and Family {WAS / WAS NO:087638} interested in participating in the Family PsychoEducation Program.     Supported Employment & Education: Yoseph {IS NOT/IS:994773::\"is not\"} in need of employment and education support.     Case Management: Yoseph {IS NOT/IS:346644::\"is not\"} followed by a .  Case Management {IS NOT/IS:238305::\"is not\"} an identified need at this time. This writer will assist in short-term case management support as needed until care is established with ongoing providers.     Other Psychosocial Supports: Yoseph {IS NOT/IS:996706::\"is not\"} interested in the  Young Adult Group.  Family would benefit from  Family Psychoeducation & Support Group.    Medical Referrals: ***     Referral information for the above mentioned supports will be discussed further at our upcoming feedback visit.   Without the recommended intervention, Yoseph is likely to experience possible increase in psychotic symptoms requiring hospitalization.     TREATMENT RISK STATEMENT:  The risks, benefits, alternatives and potential adverse effects have been discussed and are understood by the pt. The pt understands the risks of using street drugs or alcohol. There are no medical " contraindications, the pt agrees to treatment with the ability to do so. The pt knows to call the clinic for any problems or to access emergency care if needed.  Medical and substance use concerns are documented above.       PROVIDER: SABA Shepherd  SUPERVISOR: ***

## 2024-12-09 NOTE — PROGRESS NOTES
[facility] DA Consultation Outcome    Patient Name: Yoseph Grayson    Diagnostic Assessment Date: 12/03/24     Discussed information gathered in the diagnostic assessment process in multidisciplinary consultation on 12/9/2024 for the purpose of preparing the DA clinician for a future feedback session.     Diagnostically: A provisional diagnosis of 296.44 Bipolar I Disorder Current or Most Recent Episode Manic, with psycholtic features seems appropriate.     Treatment Recommendations:  - Continue care with currently established outpatient mental health providers  - [facility] Strengths Program for 1st Episode Bipolar disorder with the [facility] team (Send Epic [facility] referral to [facility] - currently a waiting list, patients will be contacted when services become available and offered a choice to begin our program)      As a reminder, the smarphrase FEPRESOURCES identifies psychosis-specific resources and referrals in the community outside of [facility].     ADAN FRANCIS, PhD

## 2024-12-10 ENCOUNTER — OFFICE VISIT (OUTPATIENT)
Dept: PSYCHIATRY | Facility: CLINIC | Age: 19
End: 2024-12-10
Payer: COMMERCIAL

## 2024-12-10 DIAGNOSIS — F31.10 BIPOLAR I DISORDER, MOST RECENT EPISODE (OR CURRENT) MANIC (H): Primary | ICD-10-CM

## 2024-12-10 NOTE — Clinical Note
Hello, please place Yoseph on the [facility] waitlist. He has Thursdays and Fridays off for a phone call. Damon, this note is ready to be attested. [place] and Pravin, KATIE only SABA Shepherd

## 2024-12-11 NOTE — PROGRESS NOTES
[facility] Clinician Feedback Counseling Session  A Part of the [facility] First Episode of Psychosis Program    Patient: Yoseph Grayson (2005, 19 year old)     MRN: [id number]  Date:  12/10/24  Clinician: SABA Shepherd    People present:   Writer  Client: Remedios Hameed    Length of Actual Contact: Start Time: 11; End Time: 11:30a     Location of contact:  [facility]    Primary diagnosis: Bipolar I disorder, most recent episode (or current) manic with psychosis (H) [F31.10]     Yoseph Grayson is currently participating in outpatient therapy and medication management services with [facility]. They do seem appropriate for [facility] FEP services. This writer has provided clinical impressions, verbal and/or written information about [facility] First Episode Psychosis programs in the context of treating schizophrenia spectrum and other disorders associated with psychosis. If Navigate program was identified as an option, this writer discussed Pt's ability to start NAVIGATE with later transfer of care if diagnostic clarity reveals a diagnosis other than a schizophrenia spectrum illness.    SABA Shepherd offered additional resources, including psychoeducation and explored feelings and reactions to diagnosis. Yoseph was in agreement of his diagnosis. Offered hopeful language and support that positive outcomes are possible with recommended treatment of care discussed above. Other referrals and resources provided to patient via [email or web address] message or printed.     Is this patient agreeable to start services with First Episode of Psychosis Services? Yes   If Yes; which program? [facility]      What services is the Pt interested in receiving in our clinics?   Medication management: Yes   Individual therapy: Yes   Family therapy: Yes   Group therapy: Yes   Work/school support: Yes     Place Pt on waitlist(s)? Yes    With whom can someone follow up for scheduling, etc.? Yoseph    Method of follow up  communication preferred? Phone - on Thursdays or Fridays    Additional information: na    Mental Status Exam   Alertness: alert  and oriented  Attention Span and Concentration:  Normal  Appearance: awake, alert, adequately groomed, appeared stated age, and casually dressed  Behavior/Demeanor: cooperative, pleasant, and calm, with good eye contact   Speech: regular rate and rhythm  Language: intact. Preferred language identified as English.  Psychomotor Behavior:  normal or unremarkable  Mood: anxious  Affect: appropriate and in normal range; was congruent to mood; was congruent to content  Associations:  no loose associations  Thought Process:  unremarkable  Thought Content:  no evidence of suicidal ideation or homicidal ideation and no evidence of psychotic thought  Perception: none  Insight: adequate  Judgment: adequate for safety  Impulse Control:  intact  Cognition: does  appear grossly intact; formal cognitive testing was not done      Clinician: SABA Shepherd  Supervisor: Damon Moore, PhD

## 2025-01-08 ENCOUNTER — VIRTUAL VISIT (OUTPATIENT)
Dept: PSYCHIATRY | Facility: CLINIC | Age: 20
End: 2025-01-08
Attending: SOCIAL WORKER
Payer: COMMERCIAL

## 2025-01-08 DIAGNOSIS — F31.10 BIPOLAR I DISORDER, MOST RECENT EPISODE (OR CURRENT) MANIC (H): Primary | ICD-10-CM

## 2025-01-08 PROCEDURE — 90837 PSYTX W PT 60 MINUTES: CPT | Mod: 95 | Performed by: SOCIAL WORKER

## 2025-01-08 ASSESSMENT — PATIENT HEALTH QUESTIONNAIRE - PHQ9
SUM OF ALL RESPONSES TO PHQ QUESTIONS 1-9: 3
10. IF YOU CHECKED OFF ANY PROBLEMS, HOW DIFFICULT HAVE THESE PROBLEMS MADE IT FOR YOU TO DO YOUR WORK, TAKE CARE OF THINGS AT HOME, OR GET ALONG WITH OTHER PEOPLE: SOMEWHAT DIFFICULT
SUM OF ALL RESPONSES TO PHQ QUESTIONS 1-9: 3

## 2025-01-08 NOTE — Clinical Note
"New [facility] enrollee.   Prefers [employer] communication.  Is working as a  but going back to school in [place] in the Fall.  Is changing majors, but doesn't know what he wants to do yet.  Is taking a paid out of pocket \"aptitude test\" soon. He could use assistance deciding his future education goals and to discuss future accommodations PRN. "

## 2025-01-08 NOTE — Clinical Note
New [facility] enrollee.   He is eager to start therapy with Mar and prefers in person appointments.   [employer] messaging preferred for scheduling.    Thank you! Clarice

## 2025-01-08 NOTE — Clinical Note
Hi-  you see Yoseph on 1/10 for MTM.  He was told via his insurance that they *might* not cover the visit.

## 2025-01-08 NOTE — PROGRESS NOTES
[facility]  Psychiatry Clinic  First Episode of Bipolar - STRIDE Program  Orientation Consult     Yoseph MITCHELL GABBY Grayson MRN# [id number]   Age: 19 year old YOB: 2005     Date:  1/08/25  Time: 1:30 PM - 2:27 PM    Interactive Complexity: No  Crisis: No    Clinician: OMID Giraldo  Diagnosis:  1. Bipolar I disorder, most recent episode (or current) manic (H)       Video- Visit Details   Reason for Video Visit: Patient convenience (e.g. access to timely appointments / distance to available provider)  Location of Originating Site (Patient): [facility] / Patient's home  Distant Location (Provider):  Off-site  Platform used for video visit:  Secure real time interactive audio and visual telecommunication system via [employer]    Where are they & how to reach if disconnected  [phone]  [address] [place], [place]             Other Attendees Present           none    Safety concerns from pre-check in documents; safety planning  PHQ9:       1/8/2025    12:18 PM   PHQ-9 SCORE   PHQ-9 Total Score MyChart 3 (Minimal depression)   PHQ-9 Total Score 3        Patient-reported     GAD7:        No data to display                [place] Protocol Risk Identification:  1) Have you wished you were dead or wished you could go to sleep and not wake up? No  2) Have you actually had any thoughts about killing yourself? No  If YES to 2, answer questions 3, 4, 5, 6  If NO to 2, go directly to question 6  3) Have you thought about how you might do this? N/A  4) Have you had any intension of acting on these thoughts of killing yourself, as opposed to you have the thoughts but you definitely would not act on them? N/A  5) Have you started to work out or worked out the details of how to kill yourself? Do you intend to carry out this plan? N/A  Always Ask Question 6  6) Have you done anything, started to do anything, or prepared to do anything to end your life? No  Examples: collected  pills, obtained a gun, gave away valuables, wrote a will or suicide note, held a gun but changed your mind, cut yourself, tried to hang yourself, etc.  RISK INDICATOR & ACTION BY CLINICIAN: Patient denied any current/recent/lifetime history of suicidal ideation and/or behaviors.  No safety plan indicated at this time.   [NOTE: If safety plan is indicated, in an added note, 1) defer originally planned intervention   2) complete SmartPhrase .SWSAFETYPLANNING    3) Send a copy of the completed plan to the patient via [email or web address]]    Discussed overall safety plan should symptoms feel unmanageable or safety concerns become imminent. Yoseph was able to contract for safety.   Crisis Numbers: After hours:  [phone]   [facility] [phone]  If you need urgent help, please call your local crisis number, 911, or go to your nearest ED.   We do have a new mental health emergency area at [facility] called Min that is very calming and helpful if you need additional support. ([address] [place] [address]  [phone]).    Intervention:  I completed an orientation and enrollment appointment today with Yoseph. Yoseph is being considered for the First Episode of Bipolar - STRIDE Program, a  program which is an extension to our First Episode Psychosis - Strengths Program. .      KYRA Don at [facility] has been managing psychiatric care while awaiting for services to being in the STRIDE Program.   Not currently seeing a therapist.  Was seeing an EMDR therapist.   Would like to start individual therapy as soon as he can    Medications you're currently taking  Lithium 600 mg (last labs 2 months ago)   Olanzapine 10 mg   Wellbutrin 150 mg (helping with depression and overthinking)  Concerned about weight gain from Olanzapine.  Hopes to go down on this medication- has been stable from psychosis.    Provided Yoseph with information about the First Episode of Bipolar - STRIDE Program.  "Discussed the background of evidence based Coordinated Specialty Care (CSC) programs and research from \"[employer]\" highlighting improved recovery rates for those who participate in team based care versus treatment as usual.      Introduced and described the First Episode of Bipolar - STRIDE Program Services:  -Medication Management (Psychiatry)  -Increasing Resiliency in Life aka IRL (individual therapy)  -Family Psychoeducation and Support  -Family   -Supported Education and Employment (SEE)  -Case Management & Coordination  -Various Group offerings  -Pharmacy Support  -Psychometry, psychological measures          Motivational Interviewing  MI Intervention: {MI INTERVENTION:908495}   Change Talk Expressed by the Patient: {Change Talk:536600}  Provider Response to Change Talk: {Response to Change Talk:653453}  Solution-Focused: asked goal-oriented questions to assist Yoseph in moving into a future-oriented direction. Collaborated on initial treatment goals.  Identified Yoseph's natural and professional supports. Provided positive feedback on Yoseph's strengths and abilities to help them recognize their existing resources and potential to make changes.    Shared provider preferences of communicating via Addvocatet, and calling the clinic for urgent matters.  Reviewed contact information for the clinic and local crisis line.      Assessment:  Yoseph was assessed and referred to the First Episode of Bipolar - STRIDE Program, a  program which is an extension to our First Episode Psychosis - Strengths Program in ***. Provisional diagnosis of ***.  Presenting symptoms include ***. Symptoms began ***. Additional symptoms consist of ***. Related to substance use, this was a concern at the time of his manic episode (using weed a lot & edibles).    Thoughts on Diagnosis- How do you feel about bipolar and psychosis being used as the label for your experience? Does that feel accurate from your " "perspective?  \"It's not awesome, but I've accepted it more now over the last 8-9 months.\" Has concerns about if it will come back and destroy his life.              Current updates Since your assessment- how have things been going for you?   Nothing major has changed or is going on.  Things have been stable.  Anxiety seems to be the biggest concern lately, feeling moderate.  Working as a     Noted safety concerns to be aware of are ***.   Support system involved in their life include ***, and involved in their care will be ***.    What do you hope to be different in your life or symptoms by participated in our services?   ***    *** expressed interest in ***.  Concern raised about ***.    Mental Status Exam:   Appearance: {Appearance:026256::\"Appropriate \"}  Eye Contact: {Eye Contact:931941::\"Good \"}  Psychomotor Behavior: {Psychomotor Behavior:131044::\"Normal \"}  Attitude: {Attitude:684472::\"Cooperative \"}  Orientation: {Orientation:687805::\"All\"}  Speech  Rate: {Speech Rate/Production:297403::\"Normal \"}  Volume: {Speech Volume:602423::\"Normal \"}  Mood: {Mood:913461::\"Normal\"}  Affect: {Affect:014201::\"Appropriate \"}  Thought Content: {Thought Content:833899::\"Clear \"}  Thought Form: {Thought Form:723657::\"Coherent \",\"Logical \"}  Insight: {Insight:092404::\"Good \"}    Plan:  New patient packet info should be emailed to: [email or web address]  Preference for Virtual/In Person- Do you have a preference for Virtual or In Person appointments?  {virtual location provider:501726} ***  FYI, virtual and in-person appointments are always an option; if interested in enrolling in our services we can conclude with looking at available appointment times for appointments you are not already scheduled.  We do not require in-person visits, but may suggest some in-person visits to better assess and support a person. You may be expected to have an in-person psychiatric med mgmt appointment within the first 6 months of " your care with us.      Will send a summary of today's appt via [employer].  I would like to send a written summary of the different team member roles, team member names and any appointments we schedule. Can I send a summary message of today's appointment to you [place]?    If you're deciding to not enroll, we can cancel the next appointments for you.  If you change your mind, you can schedule another appointment with me for enrollment anytime during the next 6 months without a re-assessment.     You are already scheduled for the following visits:  Medication Management (Psychiatry): ***  {IRT or IRL:808904}: TBD***   Family Psychoeducation and Support: TBD***  Family : Rashmi Heredia  Supported Education and Employment (SEE): [employer]  Social Work Care Coordination: Marisol Field, DCW & MSW student          Psychometry: Brooklyn Tang*** / Milady Malcolm***   PharmD MTM: Maira Holland  Nursing: [facility] GOLD team in the clinic  Groups: DBTp, Wellness, Creative Writing, Young Adult    Scheduling will contact patient/family*** to arrange next appointments for ***    Without the recommended intervention, Yoseph may experience possible increase in psychotic and/or mood symptoms requiring hospitalization.                                                    ______________________________________________________________________  CONSULT NOTE TREATMENT PLAN    Patient's Name: Yoseph Grayson  YOB: 2005    Date of Creation: 1/08/25   Date Treatment Plan Last Reviewed/Revised: N/A    DSM5 Diagnoses:   No diagnosis found.    Psychosocial / Contextual Factors; How symptoms and/or behaviors are affecting level of functioning:   ***    Referral / Collaboration:  {Referral to Professional:359628}    Anticipated number of session for this episode of care: less then 3 sessions  Anticipation frequency of session in the Strengths Program: Weekly  Treatment plan will be reviewed in 90 days  "or when goals have been changed.     MeasurableTreatment Goal(s) related to diagnosis / functional impairment(s)    SYMPTOMS; PROBLEMS   MEASURABLE GOALS;    FUNCTIONAL IMPROVEMENT INTERVENTIONS  OUTCOME / PROGRESS   Psychosis:   {AJ Psychosis:779877}    Mood:   {OP BEH FCC DEPRESSION:469743}  {OP BEH FCC SALMA:676916} Reduce psychosis and/or associated mood symptoms*** and improve overall functioning Team based coordinated specialty care in the [facility] Program, including   -Medication Management (Psychiatry)  -Individual Resiliency Training/IRT (Counseling)  -Family Psychoeducation and Support  -Family   -Supported Education and Employment (SEE)  -Case Management & Coordination  -Various Group offerings  -Pharmacy Support  -Psychometry, psychological measures    Interventions with a focus on resiliency, social skills, psychoeducation, acceptance, and recovery.   What do you hope to be different in your life or symptoms by participated in our services?   ***    ***     Patient has reviewed and agreed to the above plan.    Clarice Temple, [facility] HospitalSW  1/08/25       ______________________________________________________________________  TASKS AFTER APPT                  DOCUMENT VISIT            Use Smartphrase: .psyfeporientation                     SEND SUMMARY VIA [employer] TO THE PATIENT            Use Smartphrase: .fepmychartenrollment                     SEND SCHEDULING MESSAGE to Intake for any visits being added or rescheduled            Use Smartphrase: .fepschedappt                      ROUTE this note to currently assigned team members & their supervisors.            Use Smartphrase: .FEPENROLLMENT                     ROUTE this note to SEE or Family Peer Support for URGENT REFERRAL ATTENTION   \"URGENT SEE Referral... / URGENT Family Peer Support Referral...\"    UPDATE PT LISTS IN EPIC           Add to \"Strengths CSC Enrolled\" or \"STRIDE Enrolled\" patient lists, any appropriate waiting " "lists for therapy & group scheduling, and remove from \"Wait listed\"  Add each team member to the \"care team & communications\" in epic snapshot  Send family email address to Chelsea RANGEL for family group, if interested.    Add specialty comment in snapshot- Use Smartphrase: .juiceteam  "

## 2025-01-16 ENCOUNTER — OFFICE VISIT (OUTPATIENT)
Dept: PSYCHIATRY | Facility: CLINIC | Age: 20
End: 2025-01-16
Attending: NURSE PRACTITIONER
Payer: COMMERCIAL

## 2025-01-16 VITALS — WEIGHT: 210.6 LBS | SYSTOLIC BLOOD PRESSURE: 135 MMHG | HEART RATE: 76 BPM | DIASTOLIC BLOOD PRESSURE: 76 MMHG

## 2025-01-16 DIAGNOSIS — F31.9 BIPOLAR 1 DISORDER (H): Primary | ICD-10-CM

## 2025-01-16 PROCEDURE — 99214 OFFICE O/P EST MOD 30 MIN: CPT | Performed by: NURSE PRACTITIONER

## 2025-01-16 RX ORDER — LITHIUM CARBONATE 300 MG/1
900 CAPSULE ORAL AT BEDTIME
Qty: 90 CAPSULE | Refills: 2 | Status: SHIPPED | OUTPATIENT
Start: 2025-01-16

## 2025-01-16 RX ORDER — BUPROPION HYDROCHLORIDE 150 MG/1
150 TABLET ORAL EVERY MORNING
Qty: 30 TABLET | Refills: 2 | Status: SHIPPED | OUTPATIENT
Start: 2025-01-16

## 2025-01-16 RX ORDER — OLANZAPINE 10 MG/1
10 TABLET ORAL AT BEDTIME
Qty: 30 TABLET | Refills: 2 | Status: SHIPPED | OUTPATIENT
Start: 2025-01-16

## 2025-01-16 ASSESSMENT — PATIENT HEALTH QUESTIONNAIRE - PHQ9
SUM OF ALL RESPONSES TO PHQ QUESTIONS 1-9: 5
SUM OF ALL RESPONSES TO PHQ QUESTIONS 1-9: 5
10. IF YOU CHECKED OFF ANY PROBLEMS, HOW DIFFICULT HAVE THESE PROBLEMS MADE IT FOR YOU TO DO YOUR WORK, TAKE CARE OF THINGS AT HOME, OR GET ALONG WITH OTHER PEOPLE: NOT DIFFICULT AT ALL

## 2025-01-16 ASSESSMENT — PAIN SCALES - GENERAL: PAINLEVEL_OUTOF10: NO PAIN (0)

## 2025-01-16 NOTE — PROGRESS NOTES
[facility]  Psychiatry Clinic  PSYCHIATRY NEW PATIENT EVALUATION     CARE TEAM:  PCP- Specialists, [facility]   Yoseph is a 19 year old who prefers the name Yoseph and uses pronouns he, they.     DIAGNOSES, ASSESSMENT & PLAN                                                                                          Diagnostic Impressions (provisional)  Bipolar 1 Disorder with psychosis, mre depressed    Yoseph is a 19 year old male who reports a psychiatric history significant for multiple episodes of depressions with onset at approx age 12-13, and a first episode of joanna in May 2024 with inpatient admission at [facility].  There is a family history of depression and suicide.  Manic episode occurred in the context of recently starting zoloft + intermittent cannabis use.  He was stabilized on lithium and olanzapine.  Following resolution of joanna he experienced an episode of depression which improved with Wellbutrin.  Mood is currently stable.  He has gained approx 40 lbs since starting olanzapine and would like to eventually taper off of this medication.  Most recent lithium level was low at 0.5 at dose of 600 mg daily.  We will increase lithium today and consider dose reduction of olanzapine when lithium is in normal range. He will also consider metformin.  He denies SI/intent or plan and risk of harm to self or others is low.     Bipolar disorder  Continue Bupropion  mg daily   Increase Lithium to 900 mg at bedtime  Continue Olanzapine 10 mg at bedtime    SGA induced weight gain  Will taper olanzapine as described above + consider metformin     Lithium, SGA monitoring  9/25/24: TSH/T4, lipid panel, BMP, lithium level  Ordered today: lithium level to complete 1 week after dose increase      RTC: 2 weeks or sooner if needed   CRISIS Numbers:   Provided routinely in AVS        CHIEF CONCERN                              Bipolar disorder, medication  SE      HISTORY OF PRESENT ILLNESS                                                      Yoseph is a 19 year old who presents today for a new patient elevation in the [facility] early bipolar disorder program.  He was diagnosed with bipolar disorder during an inpatient admission from [facility], where he was admitted from 5/2 - 5/9/24.      He reports that he first experienced mental health concerns in [employer] [place] with depression.  Remembers first feeling depressed in the 7th grade.  Recalls approx 3 episodes of depression per year, lasting approx 1 month.   Felt he had some depression at baseline and then outside factors would make it worse.  Describes these episodes as low mood, anhedonia, negative thinking about himself (feeling he was a bad person, something wrong with him), low energy, hypersomnia (12 hrs per day).  He has experienced SI in the past with depression episodes.  Denies ever experiencing suicide intent or plan.  Was started on prozac in 2020, which helped prevent more significant depressions, but did not help with baseline depression and also made him feel numb.  He graduated from high school in 2022 and attended the [employer] for his freshman year before transferring to the [employer] beginning in the fall 2023.  He took prozac until some time in 2023, does not recall the dose.  Was prescribed by his pediatrician.  After coming off the prozac his mood got worse.  He had two more episodes of depressions in the context of interpersonal stressors (difficult roommate situation and a break up).  He sought care at the [place] [facility] and was started on Zoloft in April 2023.  Highest dose was 25-50 mg.  During this period he was using cannabis (delta-8 gummies) once every few weeks, and alcohol once every month. Highest use of cannabis was in the summer of 2023, daily use, but when he started the semester again he reduced use.  Denies any  "impact on mood or psychosis symptoms with cannabis use.     He reports within 3-4 wks of starting zoloft, he began to experience an increase in energy and a decrease in sleep (3 hrs or less per night, sometimes not sleeping at all).  Mood was elevated.  One night he took a cannabis edible, and the next morning he tarted to experince symptoms of psychosis, including AH (characterized by positive conversations with people he knows), unusual beliefs that he thought he had superpowers (could live forever, could fly), ability to telepathically communicate,  concerned the government would find him and want to do research on his new abilities.  Had been texting his ex girlfriend strange content, including that he had killed  someone, and she called the police.  He was  taken to the ED and then admitted to to inpatient psychiatry.  Symptoms began to stabilize with olanzapine, ativan, and lithium.  Was held on a 72 hr hold for some of his stay.     When discharged, joanna had not fully resolved.  Over the course of the next 2 weeks, joanna started to improve, and then he quickly experienced depression. Describes this depression as more \"existential\" and being related to feeling that he could not control his own body, guilt for things he did during joanna. He started to see a psychiatrist, Dr. Don at [facility].  He was started on prozac for depression, which was not helpful with depression.  This was the most distressing and severe episode of depression he has had. He returned to college and was not coping well, decided to take a year off.  He then started Wellbutrin which he found helpful.  Depression began to improve and by November he felt back to his baseline.      He is still processing the manic episode and trying to figure out what he wants to do in life.  He is enjoying spending time with friends and enjoying his hobbies.  Denies ongoing depression or joanna.  He is sleeping 12 hours per night and is groggy in the " morning.  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  Other interests including music, was a music ed major.  Denies any recent death ideation or SI.  Denies persistent anxiety, panic attacks.     He would like to start coming off the olanzapine due to weight gain of approx 40 lbs since starting this.  He reports this is ongoing, has not leveled off.      Medication SE:  Weight gain of 40 lbs since manic episode.  Baseline weight is 170 lbs, currently 210 lbs.  Appetite is increased, always feels hungry.  Sedation as above. Denies N/V/D or constipation.  Has possibly had some increase in thirst.  Drinks 3, 24 oz water bottle. Denies increase in urination.  Denies tremor, cognitive SE, sexual SE.      /76   Pulse 76   Wt 95.5 kg (210 lb 9.6 oz)     Current psychiatric medications  Bupropion  mg daily   Lithium 600 mg at bedtime  Olanzapine 10 mg at bedtime    Recent Substance Use:    Alcohol - 1x per month, 4 drinks per occasion  Cannabis - 1x per month, 10 mg of delta-9    SOCIAL and FAMILY HISTORY                                                 per pt report         As documented in 12/3 note by Hanh Wilson, reviewed and updated as needed today.   Family Hx:  Mother eating disorder, anxiety and depression  reports history of completed suicides. Uncle completed suicide.    Social Hx:    Living situation: Yoseph currently lives in [place] with parents. Yoseph reports home life is going well.  Housing Type: house with parents  Guns, weapons, or other means to harm oneself in the home?  Unknown  Relationships: Significant relationships include Parents and friend Brigido.        Education: Yosehp's highest level of education is high school graduate. Was going to school for Music Education but switched to Elementary Education. Educational goals include finishing elementary education degree and returning to D  Occupation: .  Occupational goals include to get  an additional job and to eventually go back to school for elementary education.  Spiritual considerations: Patient does not identify with [place] community.  Is spiritual but is not as into it as he used to be.   Cultural influences: Yoseph describes their race as white and [place]. Yoseph's primary spoken language is English. Yoseph identifies their sexual orientation as questioning, and their gender as male. Yoseph prefers he/they pronouns.   Current Stressors: Living at home with parents, feelings of loneliness and depression.   Strengths & Opportunities:  Hobbies and enjoyable activities include Reading and Listening to Music. Spending time outside and playing video games. Likes to read comic books. Self identified strengths are good with people and social. Exercise and nutrition habits include worries about gaining weight from the Wellbutrin.  Coping mechanisms include Meditation, Therapy, and Family.   Legal Hx: Yes: got caught with an edbile and had to do a class online    Trauma and/or Abuse Hx: There are no indications or report of: significant losses, trauma, abuse or neglect. Issues of possible neglect are not present. Identifies the hospital as a traumatic experience.   Hx: No    PAST PSYCH and SUBSTANCE USE HISTORY                      Psychiatric Hospitalizations: One hospitalization in May 2024 at [facility] for first episode of joanna   Commitment: No, Current Castillo order: No  History of Electroconvulsive Therapy (ECT) or Transcranial Magnetic Stimulation (TMS): No     Self-Injurious Behavior: Denies  Suicidal Ideation Hx: Yes - has history of ideation but has called suicide hotline  Suicide Attempt- #-:No  Violence/Aggression Hx: No    Past medication trials:  Prozac  Sertraline 25-50 mg, precipitated joanna  Ativan while admitted     Outpatient Programs & Services [Psychotherapy, DBT, Day Treatment, Eating Disorder Tx, etc]:   Current:  Currently has an outpatient therapist. Cm Don as  psychiatrist at [facility]     Past:  Has had previous therapists.Had previous hospitalization for manic episode.     Substance Use:  Past Use - Alcohol-  intermittent   and Cannabis-  monthly use since college, summer 2023 highest use at most days    No hx of substance use treatment     MEDICAL HISTORY     There is no problem list on file for this patient.    Concussion in 2016, swimming     ALLERGIES: Patient has no known allergies.     MEDICAL REVIEW OF SYSTEMS                                                                  A comprehensive review of systems was performed and is negative other than noted in the HPI.    CURRENT MEDS       Current Outpatient Medications   Medication Sig Dispense Refill    buPROPion (WELLBUTRIN XL) 150 MG 24 hr tablet Take 150 mg by mouth every morning.      lithium (ESKALITH) 600 MG capsule Take 600 mg by mouth at bedtime.      OLANZapine (ZYPREXA) 10 MG tablet Take 10 mg by mouth at bedtime.         VITALS                                                                                              There were no vitals taken for this visit.    MENTAL STATUS EXAM                                                             Alertness: alert  and oriented  Appearance: casually groomed  Behavior/Demeanor: cooperative and pleasant, with good  eye contact   Speech: normal  Language: intact  Psychomotor: normal or unremarkable  Mood: description consistent with euthymia  Affect:  euthymic ; congruent to: mood- yes, content- yes  Thought Process/Associations: unremarkable  Thought Content:  Reports none;  Denies suicidal & violent ideation and delusions  Perception:  Reports none;  Denies hallucinations  Insight: adequate  Judgment: adequate for safety  Cognition: attention span: intact  concentration: intact  recent memory: intact  remote memory: intact  fund of knowledge: appropriate  Gait and Station: unremarkable    LABS and DATA         1/8/2025    12:18 PM   PHQ   PHQ-9 Total Score  3    Q9: Thoughts of better off dead/self-harm past 2 weeks Not at all       Patient-reported       No lab results found.  No lab results found.      RISK STATEMENT for SAFETY   Yoseph Hameed did not appear to be an imminent safety risk to self or others.  Denies current SI, intent or plan.     TREATMENT RISK STATEMENT:  The risks, benefits, alternatives and potential adverse effects have been discussed and are understood by the pt. The pt understands the risks of using street drugs or alcohol. There are no medical contraindications, the pt agrees to treatment with the ability to do so. The pt knows to call the clinic for any problems or to access emergency care if needed.  Medical and substance use concerns are documented above.  Psychotropic drug interaction check was done, including changes made today.    PROVIDER:  JACK Casiano CNP       MEDICAL DECISION MAKING        (Gabriele .PSYCHBILLMDM)     90 min spent on the date of the encounter in chart review, patient visit, review of tests, documentation, care coordination, and/or discussion with other providers about the issues documented above. {      The longitudinal plan of care for the diagnosis(es)/condition(s) as documented were addressed during this visit. Due to the added complexity in care, I will continue to support Yoseph in the subsequent management and with ongoing continuity of care.

## 2025-01-23 ENCOUNTER — LAB (OUTPATIENT)
Dept: LAB | Facility: CLINIC | Age: 20
End: 2025-01-23
Attending: NURSE PRACTITIONER
Payer: COMMERCIAL

## 2025-01-23 ENCOUNTER — OFFICE VISIT (OUTPATIENT)
Dept: PSYCHIATRY | Facility: CLINIC | Age: 20
End: 2025-01-23
Attending: NURSE PRACTITIONER
Payer: COMMERCIAL

## 2025-01-23 ENCOUNTER — TELEPHONE (OUTPATIENT)
Dept: PSYCHIATRY | Facility: CLINIC | Age: 20
End: 2025-01-23

## 2025-01-23 VITALS — WEIGHT: 206.8 LBS | SYSTOLIC BLOOD PRESSURE: 122 MMHG | DIASTOLIC BLOOD PRESSURE: 79 MMHG | HEART RATE: 103 BPM

## 2025-01-23 DIAGNOSIS — Z79.899 HIGH RISK MEDICATIONS (NOT ANTICOAGULANTS) LONG-TERM USE: ICD-10-CM

## 2025-01-23 DIAGNOSIS — F31.9 BIPOLAR 1 DISORDER (H): ICD-10-CM

## 2025-01-23 DIAGNOSIS — F31.9 BIPOLAR AFFECTIVE DISORDER, REMISSION STATUS UNSPECIFIED (H): Primary | ICD-10-CM

## 2025-01-23 DIAGNOSIS — R63.5 WEIGHT GAIN: Primary | ICD-10-CM

## 2025-01-23 LAB
ANION GAP SERPL CALCULATED.3IONS-SCNC: 12 MMOL/L (ref 7–15)
BUN SERPL-MCNC: 16.2 MG/DL (ref 6–20)
CALCIUM SERPL-MCNC: 9.7 MG/DL (ref 8.8–10.4)
CHLORIDE SERPL-SCNC: 104 MMOL/L (ref 98–107)
CREAT SERPL-MCNC: 1.01 MG/DL (ref 0.67–1.17)
EGFRCR SERPLBLD CKD-EPI 2021: >90 ML/MIN/1.73M2
GLUCOSE SERPL-MCNC: 84 MG/DL (ref 70–99)
HCO3 SERPL-SCNC: 25 MMOL/L (ref 22–29)
LITHIUM SERPL-SCNC: 0.55 MMOL/L (ref 0.6–1.2)
POTASSIUM SERPL-SCNC: 4.4 MMOL/L (ref 3.4–5.3)
SODIUM SERPL-SCNC: 141 MMOL/L (ref 135–145)

## 2025-01-23 PROCEDURE — 80178 ASSAY OF LITHIUM: CPT

## 2025-01-23 PROCEDURE — 80048 BASIC METABOLIC PNL TOTAL CA: CPT

## 2025-01-23 PROCEDURE — 99214 OFFICE O/P EST MOD 30 MIN: CPT | Performed by: NURSE PRACTITIONER

## 2025-01-23 PROCEDURE — 36415 COLL VENOUS BLD VENIPUNCTURE: CPT

## 2025-01-23 ASSESSMENT — PAIN SCALES - GENERAL: PAINLEVEL_OUTOF10: NO PAIN (0)

## 2025-01-23 NOTE — NURSING NOTE
Chief Complaint   Patient presents with    Recheck Medication     Bipolar 1 disorder     - Candido Parker, Visit Facilitator

## 2025-01-23 NOTE — TELEPHONE ENCOUNTER
Phone call to Yoseph ramsey: lithium lab.  Lithium level is still on the low end, marginally higher than last lab in 9/2024.  This may be due to recent illness with nausea and vomiting.  He will  continue at current dose and repeat lab on Monday.  Will also continue olanzapine 10 mg daily dose.     Mayela Martin, CNP, 1/23/2025 12:33 PM

## 2025-01-23 NOTE — PROGRESS NOTES
[facility]  Psychiatry Clinic  PSYCHIATRY CLINIC FOLLOW UP VISIT     CARE TEAM:  PCP- Specialists, [facility] Pediatric   Yoseph is a 19 year old who prefers the name Yoseph and uses pronouns he, they.     DIAGNOSES, ASSESSMENT & PLAN                                                                                          Diagnostic Impressions (provisional)  Bipolar 1 Disorder with psychosis, mre depressed    Yoseph is a 19 year old male who reports a psychiatric history significant for multiple episodes of depressions with onset at approx age 12-13, and a first episode of joanna in May 2024 with inpatient admission at [facility].  There is a family history of depression and suicide.  Manic episode occurred in the context of recently starting zoloft + intermittent cannabis use.  He was stabilized on lithium and olanzapine.  Following resolution of joanna he experienced an episode of depression which improved with Wellbutrin.    -Today, he reports mood is stable overall.  He has gained approx 40 lbs since starting olanzapine and would like to eventually taper off of this medication.  Lithium dose increased at last visit to target therapuetic range, he will complete lab following the visit today, and we will then consider dose reduction of olanzapine if lithium level is WNL.  He would like to start metformin for medication induced weight gain.  He denies SI/intent or plan and risk of harm to self or others is low.     Bipolar disorder  Continue Bupropion  mg daily   Continue Lithium 900 mg at bedtime  Continue Olanzapine 10 mg at bedtime, will consider dose reduction pending lithium result    SGA induced weight gain  Start metformin 500 mg daily with breakfast     Lithium, SGA monitoring  9/25/24: TSH/T4, lipid panel, BMP, lithium level  Ordered: lithium level to complete 1 week after dose increase, BMP     RTC: 2 weeks or sooner if needed   CRISIS Numbers:    Provided routinely in AVS        CHIEF CONCERN                              Bipolar disorder, medication SE      Interim History                                                   -Yoseph is a 19 year old who recently enrolled in the [facility] early bipolar disorder program.  DA with me was on 1/16.  He was diagnosed with bipolar disorder during an inpatient admission from [facility], where he was admitted from 5/2 - 5/9/24.    -At last visit, lithium was increased. He reports that on Sunday, he had nausea and vomiting + diarrhea, and is not sure if this was the lithium or eating bad fish.  He did not have a fever.  It has since resolved.  Possibly could have also been exposure to illness with his job as a .    -He had an increase in thirst, which is continuing now, drinking approx 96 oz of water daily.  Has had some increase in urine output, urinating 5-6x daily including waking up at night 1x.  Has been possibly more sedated.  Continuing to sleep 12 hrs per night.  Mood has been stable, fausto concerns for persistent depression or joanna.  Denies death ideation or SI. Denies psychosis.  -No substance since last visit   -He has continued to be bothered by weight gain secondary to olanzapine and is interested in starting metformin today .    Medication SE:  Weight gain of 40 lbs since manic episode.  Baseline weight is 170 lbs, currently 206 lbs.  Appetite is increased, always feels hungry.  Sedation as above. Denies N/V/D or constipation since Sunday when he did experience N/V/D as above.  Has possibly had some increase in thirst +  urination frequency.  Drinks 96 oz daily. Denies tremor, cognitive SE, sexual SE.      /79   Pulse 103   Wt 93.8 kg (206 lb 12.8 oz)       Current psychiatric medications  Bupropion  mg daily   Lithium 900 mg at bedtime  Olanzapine 10 mg at bedtime    Recent Substance Use:    Alcohol - 1x per month, 4 drinks per occasion  Cannabis - 1x per month,  10 mg of delta-9, none since last visit    SOCIAL and FAMILY HISTORY   (no change today)                                              per pt report         As documented in 12/3 note by Hanh Wilson, reviewed and updated as needed today.   Family Hx:  Mother eating disorder, anxiety and depression  reports history of completed suicides. Uncle completed suicide.    Social Hx:    Living situation: Yoseph currently lives in [place] with parents. Yoseph reports home life is going well.  Housing Type: house with parents  Guns, weapons, or other means to harm oneself in the home?  Unknown  Relationships: Significant relationships include Parents and friend Brigido.        Education: Yoseph's highest level of education is high school graduate. Was going to school for Music Education but switched to Elementary Education. Educational goals include finishing elementary education degree and returning to [employer]  Occupation: .  Occupational goals include to get an additional job and to eventually go back to school for elementary education.  Spiritual considerations: Patient does not identify with ped[place] community.  Is spiritual but is not as into it as he used to be.   Cultural influences: Yoseph describes their race as white and [place]. Yoseph's primary spoken language is English. Yoseph identifies their sexual orientation as questioning, and their gender as male. Yoseph prefers he/they pronouns.   Current Stressors: Living at home with parents, feelings of loneliness and depression.   Strengths & Opportunities:  Hobbies and enjoyable activities include Reading and Listening to Music. Spending time outside and playing video games. Likes to read comic books. Self identified strengths are good with people and social. Exercise and nutrition habits include worries about gaining weight from the Wellbutrin.  Coping mechanisms include Meditation, Therapy, and Family.   Legal Hx: Yes: got caught with an  yousif and had to do a class online    Trauma and/or Abuse Hx: There are no indications or report of: significant losses, trauma, abuse or neglect. Issues of possible neglect are not present. Identifies the hospital as a traumatic experience.   Hx: No    PAST PSYCH and SUBSTANCE USE HISTORY                      -Remembers first feeling depressed in the 7th grade.  Recalls approx 3 episodes of depression per year, lasting approx 1 month.   Felt he had some depression at baseline and then outside factors would make it worse.  Describes these episodes as low mood, anhedonia, negative thinking about himself (feeling he was a bad person, something wrong with him), low energy, hypersomnia (12 hrs per day).  He has experienced SI in the past with depression episodes.  Denies ever experiencing suicide intent or plan.  Was started on prozac in 2020, which helped prevent more significant depressions, but did not help with baseline depression and also made him feel numb.  He took prozac until some time in 2023, does not recall the dose.  Was prescribed by his pediatrician.  After coming off the prozac his mood got worse.  He had two more episodes of depressions in the context of interpersonal stressors (difficult roommate situation and a break up).  He sought care at the [facility] and was started on Zoloft in April 2023.  Highest dose was 25-50 mg.  During this period he was using cannabis (delta-8 gummies) once every few weeks, and alcohol once every month. Highest use of cannabis was in the summer of 2023, daily use, but when he started the semester again he reduced use.  Denies any impact on mood or psychosis symptoms with cannabis use.   -He reports within 3-4 wks of starting zoloft, he began to experience an increase in energy and a decrease in sleep (3 hrs or less per night, sometimes not sleeping at all).  Mood was elevated.  One night he took a cannabis edible, and the next morning he tarted to  experince symptoms of psychosis, including AH (characterized by positive conversations with people he knows), unusual beliefs that he thought he had superpowers (could live forever, could fly), ability to telepathically communicate,  concerned the government would find him and want to do research on his new abilities.  Had been texting his ex girlfriend strange content, including that he had killed  someone, and she called the police.  He was  taken to the ED and then admitted to to inpatient psychiatry.  Symptoms began to stabilize with olanzapine, ativan, and lithium.  Was held on a 72 hr hold for some of his stay    Psychiatric Hospitalizations: One hospitalization in May 2024 at [facility] for first episode of joanna, 5/2 - 5/9/24  Commitment: No, Current Castillo order: No  History of Electroconvulsive Therapy (ECT) or Transcranial Magnetic Stimulation (TMS): No     Self-Injurious Behavior: Denies  Suicidal Ideation Hx: Yes - has history of ideation but has called suicide hotline  Suicide Attempt- #-:No  Violence/Aggression Hx: No    Past medication trials:  Prozac  Sertraline 25-50 mg, precipitated joanna  Ativan while admitted     Outpatient Programs & Services [Psychotherapy, DBT, Day Treatment, Eating Disorder Tx, etc]:   Current:  Currently has an outpatient therapist. Cm Don as psychiatrist at [facility]     Past:  Has had previous therapists.Had previous hospitalization for manic episode.     Substance Use:  Past Use - Alcohol-  intermittent   and Cannabis-  monthly use since college, summer 2023 highest use at most days    No hx of substance use treatment     MEDICAL HISTORY     There is no problem list on file for this patient.    Concussion in 2016, swimming     ALLERGIES: Patient has no known allergies.     MEDICAL REVIEW OF SYSTEMS                                                                  A comprehensive review of systems was performed and is negative other than noted in the HPI.    CURRENT  MEDS       Current Outpatient Medications   Medication Sig Dispense Refill    buPROPion (WELLBUTRIN XL) 150 MG 24 hr tablet Take 1 tablet (150 mg) by mouth every morning. 30 tablet 2    lithium 300 MG capsule Take 3 capsules (900 mg) by mouth at bedtime. 90 capsule 2    OLANZapine (ZYPREXA) 10 MG tablet Take 1 tablet (10 mg) by mouth at bedtime. 30 tablet 2       VITALS                                                                                              /79   Pulse 103   Wt 93.8 kg (206 lb 12.8 oz)     MENTAL STATUS EXAM                                                             Alertness: alert  and oriented  Appearance: casually groomed  Behavior/Demeanor: cooperative and pleasant, with good  eye contact   Speech: normal  Language: intact  Psychomotor: normal or unremarkable  Mood: description consistent with euthymia  Affect:  euthymic ; congruent to: mood- yes, content- yes  Thought Process/Associations: unremarkable  Thought Content:  Reports none;  Denies suicidal & violent ideation and delusions  Perception:  Reports none;  Denies hallucinations  Insight: adequate  Judgment: adequate for safety  Cognition: attention span: intact  concentration: intact  recent memory: intact  remote memory: intact  fund of knowledge: appropriate  Gait and Station: unremarkable    LABS and DATA         1/8/2025    12:18 PM 1/16/2025    10:12 AM   PHQ   PHQ-9 Total Score 3  5    Q9: Thoughts of better off dead/self-harm past 2 weeks Not at all Not at all       Patient-reported       No lab results found.  No lab results found.      RISK STATEMENT for SAFETY   Yoseph Hameed did not appear to be an imminent safety risk to self or others.  Denies current SI, intent or plan.     TREATMENT RISK STATEMENT:  The risks, benefits, alternatives and potential adverse effects have been discussed and are understood by the pt. The pt understands the risks of using street drugs or alcohol. There are no medical  contraindications, the pt agrees to treatment with the ability to do so. The pt knows to call the clinic for any problems or to access emergency care if needed.  Medical and substance use concerns are documented above.  Psychotropic drug interaction check was done, including changes made today.    PROVIDER:  JACK Casiano CNP      Psychiatry Individual Psychotherapy Note   Psychotherapy start time -NA  Psychotherapy end time - NA  Date treatment plan last reviewed with patient - 01/23/25  Subjective: This supportive psychotherapy session addressed issues related to goals of therapy and current psychosocial stressors. Patient's reaction: Action in the context of mental status appropriate for ambulatory setting.    Interactive complexity indicated? No  Plan: RTC in timeframe noted above  Psychotherapy services during this visit included myself and the patient.   Treatment Plan      SYMPTOMS; PROBLEMS   MEASURABLE GOALS;    FUNCTIONAL IMPROVEMENT / GAINS INTERVENTIONS DISCHARGE CRITERIA   Bipolar Disorder   Prevent joanna, depression  Learn skills for managing mood symptoms long term Supportive / psychodynamic marked symptom improvement            Medical decision making     Level of Medical Decision Making:   - At least 1 chronic problem that is not stable  - Engaged in prescription drug management during visit (discussed any medication benefits, side effects, alternatives, etc.)          The longitudinal plan of care for the diagnosis(es)/condition(s) as documented were addressed during this visit. Due to the added complexity in care, I will continue to support Yoseph in the subsequent management and with ongoing continuity of care.

## 2025-01-27 ENCOUNTER — LAB (OUTPATIENT)
Dept: LAB | Facility: CLINIC | Age: 20
End: 2025-01-27

## 2025-01-27 ENCOUNTER — TELEPHONE (OUTPATIENT)
Dept: PSYCHIATRY | Facility: CLINIC | Age: 20
End: 2025-01-27

## 2025-01-27 DIAGNOSIS — Z79.899 HIGH RISK MEDICATIONS (NOT ANTICOAGULANTS) LONG-TERM USE: ICD-10-CM

## 2025-01-27 DIAGNOSIS — F31.9 BIPOLAR AFFECTIVE DISORDER, REMISSION STATUS UNSPECIFIED (H): ICD-10-CM

## 2025-01-27 DIAGNOSIS — F31.9 BIPOLAR 1 DISORDER (H): Primary | ICD-10-CM

## 2025-01-27 LAB — LITHIUM SERPL-SCNC: 0.6 MMOL/L (ref 0.6–1.2)

## 2025-01-27 PROCEDURE — 36415 COLL VENOUS BLD VENIPUNCTURE: CPT

## 2025-01-27 PROCEDURE — 80178 ASSAY OF LITHIUM: CPT

## 2025-01-27 RX ORDER — LITHIUM CARBONATE 150 MG/1
150 CAPSULE ORAL AT BEDTIME
Qty: 30 CAPSULE | Refills: 0 | Status: SHIPPED | OUTPATIENT
Start: 2025-01-27

## 2025-01-27 NOTE — TELEPHONE ENCOUNTER
Mayela Martin APRN CNP  P Psychiatry Nurse-[facility]  [facility] team,  Can you please follow up with Yoseph about the lithium lab he had completed today, to confirm whether he has been taking it consistently and if it was drawn in the 12 hr window?  Please also ask if he has had any further illness with vomiting that could impact the level.      If none of the above seem to have impacted the level, can you please discuss with him that it is within the normal range but on the very long end of normal.  Often we want this closer to 0.7-0.8 when starting a taper off of an antipsychotic.  I would recommend increasing the dose slightly to 1050 mg daily and rechecking the level in 7 days.  If he is OK with this plan, can you please order the the lithium  mg dose for him + the repeat lithium lab?  After we establish the lithium level we can discuss first steps for tapering his olanzapine.    Thanks,  Una          Follow Up:  Writer called patient to follow up on the above. Patient reports he has been taking his lithium consistently and has not missed any doses. He states he takes his lithium at 11:00PM every night and he had his labs drawn at 10:30AM. He denies any illness with vomiting that could be impacting his level. Patient does state that he just started tracking how much water he is drinking daily and he states the past couple of days he has had about 96 oz of water, which he believes is more than he would previously drink.     Patient agreeable to increasing lithium dose and having a repeat level in 7 days if provider is recommending this.     -Writer discussed with provider and she would like to move forward with the 1050 mg dose. She advised that the water intake sounds good and that he should continue to roughly track that and let us know if he has significantly increased urine output.     Writer called patient to reiterate the plan. Patient agrees to increase his dose, recheck his lithium level in 7  days and monitor his water intake and urine output. Patient will contact the clinic if he has any questions or concerns prior to his visit next week.

## 2025-02-03 ENCOUNTER — LAB (OUTPATIENT)
Dept: LAB | Facility: CLINIC | Age: 20
End: 2025-02-03
Payer: COMMERCIAL

## 2025-02-03 DIAGNOSIS — F31.9 BIPOLAR 1 DISORDER (H): ICD-10-CM

## 2025-02-03 DIAGNOSIS — Z79.899 HIGH RISK MEDICATIONS (NOT ANTICOAGULANTS) LONG-TERM USE: ICD-10-CM

## 2025-02-03 LAB — LITHIUM SERPL-SCNC: 0.75 MMOL/L (ref 0.6–1.2)

## 2025-02-03 PROCEDURE — 36415 COLL VENOUS BLD VENIPUNCTURE: CPT

## 2025-02-03 PROCEDURE — 80178 ASSAY OF LITHIUM: CPT

## 2025-02-06 ENCOUNTER — OFFICE VISIT (OUTPATIENT)
Dept: PSYCHIATRY | Facility: CLINIC | Age: 20
End: 2025-02-06
Attending: NURSE PRACTITIONER
Payer: COMMERCIAL

## 2025-02-06 VITALS — SYSTOLIC BLOOD PRESSURE: 128 MMHG | HEART RATE: 84 BPM | WEIGHT: 207.6 LBS | DIASTOLIC BLOOD PRESSURE: 76 MMHG

## 2025-02-06 DIAGNOSIS — F31.9 BIPOLAR 1 DISORDER (H): ICD-10-CM

## 2025-02-06 PROCEDURE — 99214 OFFICE O/P EST MOD 30 MIN: CPT | Performed by: NURSE PRACTITIONER

## 2025-02-06 RX ORDER — OLANZAPINE 5 MG/1
7.5 TABLET ORAL AT BEDTIME
Qty: 45 TABLET | Refills: 2 | Status: SHIPPED | OUTPATIENT
Start: 2025-02-06

## 2025-02-06 ASSESSMENT — PAIN SCALES - GENERAL: PAINLEVEL_OUTOF10: NO PAIN (0)

## 2025-02-06 NOTE — PROGRESS NOTES
[facility]  Psychiatry Clinic  PSYCHIATRY CLINIC FOLLOW UP VISIT     CARE TEAM:  PCP- Specialists, [facility] Pediatric   Yoseph is a 19 year old who prefers the name Yoseph and uses pronouns he, they.     DIAGNOSES, ASSESSMENT & PLAN                                                                                          Diagnostic Impressions (provisional)  Bipolar 1 Disorder with psychosis, mre depressed    Yoseph is a 19 year old male who reports a psychiatric history significant for multiple episodes of depressions with onset at approx age 12-13, and a first episode of joanna in May 2024 with inpatient admission at [facility].  There is a family history of depression and suicide.  Manic episode occurred in the context of recently starting zoloft + intermittent cannabis use.  He was stabilized on lithium and olanzapine.  Following resolution of joanna he experienced an episode of depression which improved with Wellbutrin.    -Today, he reports mood is stable overall.  He has gained approx 40 lbs since starting olanzapine and would like to eventually taper off of this medication.  Lithium is now WNL with recent dose increase, and we will begin gradual taper of olanzapine today.  Declines further dose increase of metformin today.   He denies SI/intent or plan and risk of harm to self or others is low.     Bipolar disorder  Continue Bupropion  mg daily   Continue Lithium 1050 mg at bedtime  Decrease Olanzapine to 7.5 mg  at bedtime     SGA induced weight gain  Continue metformin 500 mg daily with breakfast, declines dose increase today     Lithium, SGA monitoring  9/25/24: TSH/T4, lipid panel  1/23/25: BMP  2/3/25: Lithium level        RTC: 2 weeks or sooner if needed   CRISIS Numbers:   Provided routinely in AVS        CHIEF CONCERN                                Bipolar disorder, medication SE    Interim History                                                    -Yoseph is a 19 year old who recently enrolled in the STRIDE early bipolar disorder program.  DA with me was on 1/16.  He was diagnosed with bipolar disorder during an inpatient admission from [facility], where he was admitted from 5/2 - 5/9/24.    -Last seen by me on 1/23 at which time metformin was started to address antipsychotic induced weight gain.  He reports that metformin initiation has gone well, no SE and he does notice a decrease in appetite.  He is snacking less throughout the day but still eating more than is typical for him.   -Lithium level of 0.75 on 2/3 with 1500 mg daily dose.  He has noticed a slight increase in thirst on this dose.    -Mood has remained stable.  Denies persistent depression or symptoms of joanna.  Continues to sleep a lot, 11-12 hours per night.  It takes 30-60 min to fall asleep most nights.  Does spend a few hours on his phone prior to bed.  Takes medication around 11, and tries to be sleeping by 12.  He is also groggy when he wakes up. Energy level is low during the day.  Denies death ideation or SI. Denies psychosis.  -No substance since last visit     Medication SE:  Weight gain of 40 lbs since manic episode.  Baseline weight is 170 lbs, currently 206 lbs.  Appetite is increased but improved with metformin.  Sedation as above. Denies N/V/D or constipation.  Has possibly had some increase in thirst +  urination frequency.  Drinks 96 oz daily. Denies tremor, cognitive SE, sexual SE.      /76   Pulse 84   Wt 94.2 kg (207 lb 9.6 oz)       Current psychiatric medications  Bupropion  mg daily   Lithium 1050 mg at bedtime  Olanzapine 10 mg at bedtime  Metformin 500 mg daily     Recent Substance Use:    Alcohol - 1x per month, 4 drinks per occasion  Cannabis - 1x per month, 10 mg of delta-9, none since last visit    SOCIAL and FAMILY HISTORY   (no change today)                                              per pt report         As documented in 12/3  note by Hanh Wilson, reviewed and updated as needed today.   Family Hx:  Mother eating disorder, anxiety and depression  reports history of completed suicides. Uncle completed suicide.    Social Hx:    Living situation: Yoseph currently lives in [place] with parents. Yoseph reports home life is going well.  Housing Type: house with parents  Guns, weapons, or other means to harm oneself in the home?  Unknown  Relationships: Significant relationships include Parents and friend Brigido.        Education: Yoseph's highest level of education is high school graduate. Was going to school for Music Education but switched to Elementary Education. Educational goals include finishing elementary education degree and returning to D  Occupation: .  Occupational goals include to get an additional job and to eventually go back to school for elementary education.  Spiritual considerations: Patient does not identify with ped[place] l[place] community.  Is spiritual but is not as into it as he used to be.   Cultural influences: Yoseph describes their race as white and Mong[place]ian. Yoseph's primary spoken language is English. Yoseph identifies their sexual orientation as questioning, and their gender as male. Yoseph prefers he/they pronouns.   Current Stressors: Living at home with parents, feelings of loneliness and depression.   Strengths & Opportunities:  Hobbies and enjoyable activities include Reading and Listening to Music. Spending time outside and playing video games. Likes to read comic books. Self identified strengths are good with people and social. Exercise and nutrition habits include worries about gaining weight from the Wellbutrin.  Coping mechanisms include Meditation, Therapy, and Family.   Legal Hx: Yes: got caught with an edbile and had to do a class online    Trauma and/or Abuse Hx: There are no indications or report of: significant losses, trauma, abuse or neglect. Issues of possible neglect are not  present. Identifies the hospital as a traumatic experience.   Hx: No    PAST PSYCH and SUBSTANCE USE HISTORY                      -Remembers first feeling depressed in the 7th grade.  Recalls approx 3 episodes of depression per year, lasting approx 1 month.   Felt he had some depression at baseline and then outside factors would make it worse.  Describes these episodes as low mood, anhedonia, negative thinking about himself (feeling he was a bad person, something wrong with him), low energy, hypersomnia (12 hrs per day).  He has experienced SI in the past with depression episodes.  Denies ever experiencing suicide intent or plan.  Was started on prozac in 2020, which helped prevent more significant depressions, but did not help with baseline depression and also made him feel numb.  He took prozac until some time in 2023, does not recall the dose.  Was prescribed by his pediatrician.  After coming off the prozac his mood got worse.  He had two more episodes of depressions in the context of interpersonal stressors (difficult roommate situation and a break up).  He sought care at the [facility] and was started on Zoloft in April 2023.  Highest dose was 25-50 mg.  During this period he was using cannabis (delta-8 gummies) once every few weeks, and alcohol once every month. Highest use of cannabis was in the summer of 2023, daily use, but when he started the semester again he reduced use.  Denies any impact on mood or psychosis symptoms with cannabis use.   -He reports within 3-4 wks of starting zoloft, he began to experience an increase in energy and a decrease in sleep (3 hrs or less per night, sometimes not sleeping at all).  Mood was elevated.  One night he took a cannabis edible, and the next morning he tarted to experince symptoms of psychosis, including AH (characterized by positive conversations with people he knows), unusual beliefs that he thought he had superpowers (could live forever, could  fly), ability to telepathically communicate,  concerned the government would find him and want to do research on his new abilities.  Had been texting his ex girlfriend strange content, including that he had killed  someone, and she called the police.  He was  taken to the ED and then admitted to to inpatient psychiatry.  Symptoms began to stabilize with olanzapine, ativan, and lithium.  Was held on a 72 hr hold for some of his stay    Psychiatric Hospitalizations: One hospitalization in May 2024 at [facility] for first episode of joanna, 5/2 - 5/9/24  Commitment: No, Current Castillo order: No  History of Electroconvulsive Therapy (ECT) or Transcranial Magnetic Stimulation (TMS): No     Self-Injurious Behavior: Denies  Suicidal Ideation Hx: Yes - has history of ideation but has called suicide hotline  Suicide Attempt- #-:No  Violence/Aggression Hx: No    Past medication trials:  Prozac  Sertraline 25-50 mg, precipitated joanna  Ativan while admitted     Outpatient Programs & Services [Psychotherapy, DBT, Day Treatment, Eating Disorder Tx, etc]:   Current:  Currently has an outpatient therapist. Cm Don as psychiatrist at [facility]     Past:  Has had previous therapists.Had previous hospitalization for manic episode.     Substance Use:  Past Use - Alcohol-  intermittent   and Cannabis-  monthly use since college, summer 2023 highest use at most days    No hx of substance use treatment     MEDICAL HISTORY     There is no problem list on file for this patient.    Concussion in 2016, swimming     ALLERGIES: Patient has no known allergies.     MEDICAL REVIEW OF SYSTEMS                                                                  A comprehensive review of systems was performed and is negative other than noted in the HPI.    CURRENT MEDS       Current Outpatient Medications   Medication Sig Dispense Refill    buPROPion (WELLBUTRIN XL) 150 MG 24 hr tablet Take 1 tablet (150 mg) by mouth every morning. 30 tablet 2     lithium (ESKALITH) 150 MG capsule Take 1 capsule (150 mg) by mouth at bedtime. Take with three 300 mg tablets for a total bedtime dose of 1050 mg. 30 capsule 0    lithium 300 MG capsule Take 3 capsules (900 mg) by mouth at bedtime. 90 capsule 2    metFORMIN (GLUCOPHAGE) 500 MG tablet Take 1 tablet (500 mg) by mouth daily (with breakfast). 30 tablet 1    OLANZapine (ZYPREXA) 10 MG tablet Take 1 tablet (10 mg) by mouth at bedtime. 30 tablet 2       VITALS                                                                                              There were no vitals taken for this visit.    MENTAL STATUS EXAM                                                             Alertness: alert  and oriented  Appearance: casually groomed  Behavior/Demeanor: cooperative and pleasant, with good  eye contact   Speech: normal  Language: intact  Psychomotor: normal or unremarkable  Mood: description consistent with euthymia  Affect:  euthymic ; congruent to: mood- yes, content- yes  Thought Process/Associations: unremarkable  Thought Content:  Reports none;  Denies suicidal & violent ideation and delusions  Perception:  Reports none;  Denies hallucinations  Insight: adequate  Judgment: adequate for safety  Cognition: attention span: intact  concentration: intact  recent memory: intact  remote memory: intact  fund of knowledge: appropriate  Gait and Station: unremarkable    LABS and DATA         1/8/2025    12:18 PM 1/16/2025    10:12 AM   PHQ   PHQ-9 Total Score 3  5    Q9: Thoughts of better off dead/self-harm past 2 weeks Not at all Not at all       Patient-reported       Recent Labs   Lab Test 01/23/25  1054   CR 1.01   GFRESTIMATED >90     No lab results found.      RISK STATEMENT for SAFETY   Yoseph Hameed did not appear to be an imminent safety risk to self or others.  Denies current SI, intent or plan.     TREATMENT RISK STATEMENT:  The risks, benefits, alternatives and potential adverse effects have been discussed and  are understood by the pt. The pt understands the risks of using street drugs or alcohol. There are no medical contraindications, the pt agrees to treatment with the ability to do so. The pt knows to call the clinic for any problems or to access emergency care if needed.  Medical and substance use concerns are documented above.  Psychotropic drug interaction check was done, including changes made today.    PROVIDER:  JACK Casiano CNP      Psychiatry Individual Psychotherapy Note   Psychotherapy start time -11:15  Psychotherapy end time - 11:31  Date treatment plan last reviewed with patient - 2/6/2025  Subjective: This supportive psychotherapy session addressed issues related to goals of therapy and current psychosocial stressors. Patient's reaction: Action in the context of mental status appropriate for ambulatory setting.    Interactive complexity indicated? No  Plan: RTC in timeframe noted above  Psychotherapy services during this visit included myself and the patient.   Treatment Plan      SYMPTOMS; PROBLEMS   MEASURABLE GOALS;    FUNCTIONAL IMPROVEMENT / GAINS INTERVENTIONS DISCHARGE CRITERIA   Bipolar Disorder   Prevent joanna, depression  Learn skills for managing mood symptoms long term Supportive / psychodynamic marked symptom improvement            Medical decision making     Level of Medical Decision Making:   - At least 1 chronic problem that is not stable  - Engaged in prescription drug management during visit (discussed any medication benefits, side effects, alternatives, etc.)          The longitudinal plan of care for the diagnosis(es)/condition(s) as documented were addressed during this visit. Due to the added complexity in care, I will continue to support Yoseph in the subsequent management and with ongoing continuity of care.

## 2025-02-06 NOTE — NURSING NOTE
Chief Complaint   Patient presents with    Recheck Medication     Bipolar Disorder     - Candido Parker, Visit Facilitator

## 2025-02-10 ENCOUNTER — OFFICE VISIT (OUTPATIENT)
Dept: PSYCHIATRY | Facility: CLINIC | Age: 20
End: 2025-02-10
Attending: SOCIAL WORKER
Payer: COMMERCIAL

## 2025-02-10 DIAGNOSIS — F31.10 BIPOLAR I DISORDER, MOST RECENT EPISODE (OR CURRENT) MANIC (H): Primary | ICD-10-CM

## 2025-02-10 NOTE — PROGRESS NOTES
[facility]  Psychiatry Clinic  First Episode of Bipolar - STRIDE Program  Increasing Resiliency in Life aka IRL (individual therapy)  Clinician Contact & Psychotherapy Progress Note      Patient: Yoseph Grayson (2005), MRN: [id number]  Date: 2/10/25  Diagnosis:   1. Bipolar I disorder, most recent episode (or current) manic (H)       Clinician: Mar Yang  Attendees: Patient      Service Type: Individual  Extended Session (60+ minutes): No  Interactive Complexity: No  Crisis: No    Length of Actual Contact: Start Time: 12:02; End Time: 12:53  In- Person at the [facility]: Yes      Data:  Progress on treatment objective & homework: Satisfactory progress - ACTION (Actively working towards change); Intervened by reinforcing change plan / affirming steps taken      Medication utilization: No changes to current psychiatric medication(s)   Notable medication updates/questions/concerns since our last visit include: Yoseph reported that he has been sleeping less since dropping dose of Olanzapine to 5mg. He said he feels less groggy and has more energy.  Writer will notify assigned prescriber, RN team, and Pharmacist for follow-up.     Intervention:  Motivational Interviewing- Expressed Empathy/Understanding, Supported Autonomy, Collaboration, Evocation, Permission to raise concern or advise, Open-ended questions, Reflections: simple and complex, and Reframe / Response to change talk- E - Evoked more info from patient about behavior change, A - Affirmed patient's thoughts, decisions, or attempts at behavior change, R - Reflected patient's change talk, and S - Summarized patient's change talk statements  Educational Teaching Strategies- Review of written material/education  Relate information to client's experience  Ask questions to check comprehension  Break down information into small chunks  Adopt client's language   CBT Teaching Strategies- Cognitive  restructuring (identify thoughts related to negative feelings, examine the evidence, and change though or form action plan), Pleasant Activity Scheduling (scheduling activities in the near future that you can look forward to, introduced more positivity around engagement in future activities, and reduce negative thinking about future activities), Recognizing the positive (visualize, write, and discuss the best parts of the day to promote positive thinking patterns) , and Reinforcement and shaping (positive feedback for steps towards goals, gains in knowledge & skills, and follow-through on home assignments)    Assessment/Progress Note:   Yoseph attended today's session.  Yoseph reported that his dose decrease of Olanzapine to 5mg last week has been positive.  He reported that he has been sleeping approximately 8 hrs a night and has been feeling less groggy when he wakes up.  He stated he will make sure he reports to providers if joanna symptoms occur.    We focused today's session on identity.  Yoseph completed the Values assessment and listed his values as Intimacy (values close relationships) Openmindedness (enjoys new ideas and experiences), Self Awareness, Mindfulness, and Curiosity. We discussed the Stride model and how values are important to help identify who we want to become as we get older. Values can help identify situations and people that may not be beneficial to our growth and can help us make good decisions as we move forward. We discussed how his personal values listed above can serve him as he moves towards his goals.    Focus for today's session: Module 7 - Developing Resiliency - standard module  Module 8 - Building a Bridge to Your Goals  Module 9 - Dealing with Negative Feelings    Assessed symptom presence and potential triggers for the patient.  Yoseph reports that symptoms include anxiety, which is stable. Our continued goal is promoting return to functioning in emotional regulation,  thought process and social relationships, improve coping skills to deal with stress and interpersonal relationships, provide supportive therapy, psychoeducation, and understanding stressors that trigger psychotic episodes.      Today we reviewed Module 9 - Dealing with Negative Feelings.      Writer used a relational and interpersonal approach to explore feelings, motivations, and behavior. Writer offered support, feedback, validation, and reinforced use of skills taught in IRT from modalities including cognitive behavioral therapy, psycho education, and skills training. Promoted understanding of their experiences of psychosis and how it impacts them in important areas of their life and in recovery goals. Reflected on client's strengths and resiliency factors and facilitated discussion on how these can assist in symptom management, recovery, and well-being.    Overall Yoseph was cooperative, attentive, and engaged throughout the session.  Observed symptoms include: no unusual symptoms outside of baseline.  As of today's appt insight to their mental illness appears good.  Progress toward goal completion seems good.    Safety:   Risk status (Self / Other harm or suicidal ideation)   denies current fears or concerns for personal safety.   denies current or recent suicidal ideation or behaviors. - CSSRS completed below.    denies current or recent homicidal ideation or behaviors.   denies current or recent self injurious behavior or ideation.   denies other safety concerns.   reports there has been no change in risk factors since their last session.     reports there has been no change in protective factors since their last session.     Recommended that patient call 911 or go to the local ED should there be a change in any of these risk factors    Heber Protocol Risk Identification:  1) Have you wished you were dead or wished you could go to sleep and not wake up? No  2) Have you actually had any thoughts about  killing yourself? No  If YES to 2, answer questions 3, 4, 5, 6  If NO to 2, go directly to question 6  3) Have you thought about how you might do this? N/A  4) Have you had any intension of acting on these thoughts of killing yourself, as opposed to you have the thoughts but you definitely would not act on them? N/A  5) Have you started to work out or worked out the details of how to kill yourself? Do you intend to carry out this plan? N/A  Always Ask Question 6  6) Have you done anything, started to do anything, or prepared to do anything to end your life? N/A  Examples: collected pills, obtained a gun, gave away valuables, wrote a will or suicide note, held a gun but changed your mind, cut yourself, tried to hang yourself, etc.  RISK INDICATOR & ACTION BY CLINICIAN: Patient denied any current/recent/lifetime history of suicidal ideation and/or behaviors.  No safety plan indicated at this time.       Discussed overall safety plan should symptoms feel unmanageable or safety concerns become imminent. Yoseph was able to contract for safety.   Crisis Numbers: After hours:  [phone]   Call or Text 988 or [facility] [phone]    Mental Status Exam:  Appearance: Appropriate   Eye Contact: Good   Psychomotor Behavior: Normal   Attitude: Cooperative   Orientation: All  Speech Rate: Normal   Volume: Normal   Mood: Normal  Affect: Appropriate   Thought Content: Clear   Thought Form: Coherent  Logical   Insight: Good     Plan/Referrals:   Yoseph is participating in coordinated speciality care via the First Episode of Bipolar - STRIDE Program within our clinic.  I will meet with Yoseph weekly  for Increasing Resiliency in Life aka IRL (individual therapy), therapy, and support aimed at maximizing Yoseph's opportunity for recovery from extreme symptoms states of psychosis, joanna, and/or depression.      Home practice was identified as Strengths assessment  Next topic to consider: Check in  Next session: 1  week    PROVIDER: Mar Yang      Patient reviewed in supervision with OMID Giraldo who will sign the note.       PHQ, TAURUS, PROMIS, MIRECC GAF, and IMR Scales (reviewed every 90 days)  PHQ9:       1/8/2025    12:18 PM 1/16/2025    10:12 AM   PHQ-9 SCORE   PHQ-9 Total Score MyChart 3 (Minimal depression) 5 (Mild depression)   PHQ-9 Total Score 3  5        Patient-reported     GAD7:        No data to display              PROMIS 10-Global Health (only subscores and total score):       1/8/2025    12:30 PM 1/16/2025    10:14 AM   PROMIS-10 Scores Only   Global Mental Health Score 13  14    Global Physical Health Score 14  16    PROMIS TOTAL - SUBSCORES 27  30        Patient-reported

## 2025-02-17 ENCOUNTER — OFFICE VISIT (OUTPATIENT)
Dept: PSYCHIATRY | Facility: CLINIC | Age: 20
End: 2025-02-17
Attending: SOCIAL WORKER
Payer: COMMERCIAL

## 2025-02-17 DIAGNOSIS — F31.10 BIPOLAR I DISORDER, MOST RECENT EPISODE (OR CURRENT) MANIC (H): Primary | ICD-10-CM

## 2025-02-17 PROCEDURE — 90834 PSYTX W PT 45 MINUTES: CPT | Mod: U7

## 2025-02-17 NOTE — PROGRESS NOTES
"   [facility]  Psychiatry Clinic  First Episode of Bipolar - STRIDE Program  Increasing Resiliency in Life aka IRL (individual therapy)  Clinician Contact & Psychotherapy Progress Note      Patient: Yoseph Grayson (2005), MRN: [id number]  Date: 2/17/25  Diagnosis:   1. Bipolar I disorder, most recent episode (or current) manic (H)       Clinician: Mar Yang  Attendees: Patient      Service Type: Individual  Extended Session (60+ minutes): No  Interactive Complexity: No  Crisis: No    Length of Actual Contact: Start Time: 11:58; End Time: 12:50  In- Person at the [facility]: Yes      Data:  Progress on treatment objective & homework: Minimal progress - CONTEMPLATION (Considering change and yet undecided); Intervened by assessing the negative and positive thinking (ambivalence) about behavior change      Medication utilization: No changes to current psychiatric medication(s)   No notable medication updates/questions/concerns since our last visit.      Current / Ongoing Stressors and Urgent Concerns: V62.4Social exclusion or rejection \"I feel like I'm not as good as the people I'm hanging out with\" and V62.9 Unspecified problem related to unspecified psychosocial circumstances Yoseph has stated that he has quite a bit of social anxiety when hanging out with people he doesn't know well or in situations or environments that are not familiar      Intervention:  Motivational Interviewing- Expressed Empathy/Understanding, Supported Autonomy, Collaboration, Evocation, Permission to raise concern or advise, Open-ended questions, Reflections: simple and complex, Rolled with resistance: Emphasized patient autonomy, Simple reflection, Complex reflection, Amplified reflection (weaker or stronger meaning), Reframed sustain talk in the direction of change, and Evoked patient agenda, Change talk (evoked), and Reframe / Response to change talk- E - Evoked more info from " "patient about behavior change, A - Affirmed patient's thoughts, decisions, or attempts at behavior change, R - Reflected patient's change talk, and S - Summarized patient's change talk statements  Educational Teaching Strategies- Relate information to client's experience  Ask questions to check comprehension  Break down information into small chunks  Adopt client's language   CBT Teaching Strategies- Cognitive restructuring (identify thoughts related to negative feelings, examine the evidence, and change though or form action plan), Pleasant Activity Scheduling (scheduling activities in the near future that you can look forward to, introduced more positivity around engagement in future activities, and reduce negative thinking about future activities), Recognizing the positive (visualize, write, and discuss the best parts of the day to promote positive thinking patterns) , Relaxation training (model relaxation technique, practice technique, feedback, and plan home practice), and Reinforcement and shaping (positive feedback for steps towards goals, gains in knowledge & skills, and follow-through on home assignments)    Assessment/Progress Note:   Yoseph attended today's session.  He went to [place] this weekend to visit friends and we discussed what he describes as social anxiety. He stated he was \"super anxious\" and \"scared\". When I asked him to elaborate, he stated that he feels \"like I'm not as good as the people I'm hanging out with\". 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  Yoseph also stated that he would like to have a relationship with someone and that he is ready to start dating again.  When I asked why a romantic relationship was so important to him he stated that he \"misses physical intimacy and having someone to " "talk to\". I challenged Yoseph to ask himself if he equates his sense of identity and his perceived self worth with being in a relationship. We spent the majority of today's session working on self confidence and identity.     Focus for today's session: Module 9 - Dealing with Negative Feelings    Assessed symptom presence and potential triggers for the patient.  Yoseph reports that symptoms include anxiety, which is stable. Our continued goal is promoting return to functioning in emotional regulation, thought process and social relationships, interact appropriately in social situations, improve coping skills to deal with stress and interpersonal relationships, provide supportive therapy, psychoeducation, and understanding stressors that trigger psychotic episodes.      Today we reviewed Module 9 - Dealing with Negative Feelings.      Writer used a relational and interpersonal approach to explore feelings, motivations, and behavior. Writer offered support, feedback, validation, and reinforced use of skills taught in IRT from modalities including cognitive behavioral therapy, psycho education, and skills training. Promoted understanding of their experiences of psychosis and how it impacts them in important areas of their life and in recovery goals. Reflected on client's strengths and resiliency factors and facilitated discussion on how these can assist in symptom management, recovery, and well-being.    Overall Yoseph was cooperative, attentive, and engaged throughout the session.  Observed symptoms include: no unusual symptoms outside of baseline.  As of today's appt insight to their mental illness appears adequate.  Progress toward goal completion seems adequate.    Safety:   Risk status (Self / Other harm or suicidal ideation)   denies current fears or concerns for personal safety.   denies current or recent suicidal ideation or behaviors. - CSSRS completed below.    denies current or recent homicidal " ideation or behaviors.   denies current or recent self injurious behavior or ideation.   denies other safety concerns.   reports there has been no change in risk factors since their last session.     reports there has been no change in protective factors since their last session.     Recommended that patient call 911 or go to the local ED should there be a change in any of these risk factors    [place] Protocol Risk Identification:  1) Have you wished you were dead or wished you could go to sleep and not wake up? No  2) Have you actually had any thoughts about killing yourself? No  If YES to 2, answer questions 3, 4, 5, 6  If NO to 2, go directly to question 6  3) Have you thought about how you might do this? No  4) Have you had any intension of acting on these thoughts of killing yourself, as opposed to you have the thoughts but you definitely would not act on them? No  5) Have you started to work out or worked out the details of how to kill yourself? Do you intend to carry out this plan? No  Always Ask Question 6  6) Have you done anything, started to do anything, or prepared to do anything to end your life? No  Examples: collected pills, obtained a gun, gave away valuables, wrote a will or suicide note, held a gun but changed your mind, cut yourself, tried to hang yourself, etc.  RISK INDICATOR & ACTION BY CLINICIAN: Patient denied any current/recent/lifetime history of suicidal ideation and/or behaviors.  No safety plan indicated at this time.       Discussed overall safety plan should symptoms feel unmanageable or safety concerns become imminent. Yoseph was able to contract for safety.   Crisis Numbers: After hours:  [phone]   Call or Text 988 or [facility] [phone]    Mental Status Exam:  Appearance: Appropriate   Eye Contact: Good   Psychomotor Behavior: Normal   Attitude: Cooperative   Orientation: All  Speech Rate: Normal   Volume: Normal   Mood: Normal  Affect: Appropriate   Thought  Content: Clear   Thought Form: Coherent  Logical   Insight: Good     Plan/Referrals:   Yoseph is participating in coordinated speciality care via the First Episode of Bipolar - STRIDE Program within our clinic.  I will meet with Yoseph weekly  for Increasing Resiliency in Life aka IRL (individual therapy), therapy, and support aimed at maximizing Yoseph's opportunity for recovery from extreme symptoms states of psychosis, joanna, and/or depression.      Home practice was identified as None  Next topic to consider: strengths & goals  Next session: 1 week    PROVIDER: Mar Yang      Patient reviewed in supervision with OMID Giraldo who will sign the note.       Additional screening measures (Complete every 90 days)       PHQ, TAURUS, PROMIS, MIRECC GAF, and IMR Scales (reviewed every 90 days)  PHQ9:       1/8/2025    12:18 PM 1/16/2025    10:12 AM   PHQ-9 SCORE   PHQ-9 Total Score MyChart 3 (Minimal depression) 5 (Mild depression)   PHQ-9 Total Score 3  5        Patient-reported     GAD7:        No data to display              PROMIS 10-Global Health (only subscores and total score):       1/8/2025    12:30 PM 1/16/2025    10:14 AM   PROMIS-10 Scores Only   Global Mental Health Score 13  14    Global Physical Health Score 14  16    PROMIS TOTAL - SUBSCORES 27  30        Patient-reported

## 2025-02-18 DIAGNOSIS — R63.5 WEIGHT GAIN: ICD-10-CM

## 2025-02-18 NOTE — TELEPHONE ENCOUNTER
metFORMIN (GLUCOPHAGE) 500 MG tablet 30 tablet 1 1/23/2025   Sent 30 days and one refill on 1/23/25 to [facility] [id number]

## 2025-02-19 DIAGNOSIS — F31.9 BIPOLAR 1 DISORDER (H): ICD-10-CM

## 2025-02-19 NOTE — TELEPHONE ENCOUNTER
"Date of Last Office Visit: 2/20/2025  [facility]      Mayela Martin APRN CNP  Psychiatry    RTC: 2 weeks  No shows: 0  Cancellations: 0  Date of Next Office Visit:   3/6/25        [facility] ([facility])   Mayela Martin APRN CNP     ------------------------------    Last Script Details::     Disp Refills Start End JORDY   lithium (ESKALITH) 150 MG capsule 30 capsule 0 1/27/2025 -- No   Sig - Route: Take 1 capsule (150 mg) by mouth at bedtime. Take with three 300 mg tablets for a total bedtime dose of 1050 mg. - Oral     ------------------------------  From last visit note:   \"Continue Lithium 1050 mg at bedtime \"        Refill Decision:    [x]Medication refilled per  Medication Refill in Ambulatory Care  policy.              Request from pharmacy:  Requested Prescriptions   Pending Prescriptions Disp Refills    lithium (ESKALITH) 150 MG capsule [Pharmacy Med Name: LITHIUM CARBONATE 150 MG CAP] 90 capsule 1     Sig: TAKE 1 CAPSULE (150 MG) BY MOUTH AT BEDTIME. TAKE WITH THREE 300 MG TABLETS FOR A TOTAL BEDTIME DOSE OF 1050 MG.       There is no refill protocol information for this order                "

## 2025-02-20 ENCOUNTER — OFFICE VISIT (OUTPATIENT)
Dept: PSYCHIATRY | Facility: CLINIC | Age: 20
End: 2025-02-20
Attending: NURSE PRACTITIONER
Payer: COMMERCIAL

## 2025-02-20 VITALS — WEIGHT: 204.8 LBS | HEART RATE: 76 BPM | DIASTOLIC BLOOD PRESSURE: 76 MMHG | SYSTOLIC BLOOD PRESSURE: 124 MMHG

## 2025-02-20 DIAGNOSIS — F31.9 BIPOLAR 1 DISORDER (H): Primary | ICD-10-CM

## 2025-02-20 PROCEDURE — 99214 OFFICE O/P EST MOD 30 MIN: CPT | Performed by: NURSE PRACTITIONER

## 2025-02-20 RX ORDER — LITHIUM CARBONATE 150 MG/1
150 CAPSULE ORAL AT BEDTIME
Qty: 30 CAPSULE | Refills: 0 | Status: SHIPPED | OUTPATIENT
Start: 2025-02-20

## 2025-02-20 ASSESSMENT — PAIN SCALES - GENERAL: PAINLEVEL_OUTOF10: NO PAIN (0)

## 2025-02-20 NOTE — PROGRESS NOTES
[facility]  Psychiatry Clinic  PSYCHIATRY CLINIC FOLLOW UP VISIT     CARE TEAM:  PCP- Specialists, [facility]   Yoseph is a 19 year old who prefers the name Yoseph and uses pronouns he, they.     DIAGNOSES, ASSESSMENT & PLAN                                                                                          Diagnostic Impressions (provisional)  Bipolar 1 Disorder with psychosis, mre depressed    Yoseph is a 19 year old male who reports a psychiatric history significant for multiple episodes of depressions with onset at approx age 12-13, and a first episode of joanna in May 2024 with inpatient admission at [facility].  There is a family history of depression and suicide.  Manic episode occurred in the context of recently starting zoloft + intermittent cannabis use.  He was stabilized on lithium and olanzapine.  Following resolution of joanna he experienced an episode of depression which improved with Wellbutrin.    -Today, he reports mood is stable overall and he tolerated the initial dose reduction of olanzapine without concern for mood symptoms.  He has gained approx 40 lbs since starting olanzapine and would like to eventually taper off of this medication.  Lithium is now WNL with recent dose increase.  We will continue at current dose x weeks then consider further reduction.  Declines further dose increase of metformin today.   He denies SI/intent or plan and risk of harm to self or others is low.     Bipolar disorder  Continue Bupropion  mg daily   Continue Lithium 1050 mg at bedtime  Continue Olanzapine 5 mg at bedtime     SGA induced weight gain  Continue metformin 500 mg daily with breakfast, declines dose increase today     Lithium, SGA monitoring  9/25/24: TSH/T4, lipid panel  1/23/25: BMP  2/3/25: Lithium level        RTC: 2 weeks or sooner if needed   CRISIS Numbers:   Provided routinely in AVS        CHIEF CONCERN                     "            Bipolar disorder, medication SE    Interim History                                                   -Yoseph is a 19 year old who recently enrolled in the [facility] early bipolar disorder program.  DA with me was on 1/16.  He was diagnosed with bipolar disorder during an inpatient admission from [facility], where he was admitted from 5/2 - 5/9/24.    -Last seen by me on 2/6 at which time olanzapine dose was decreased.  He has been on the 5 mg dose for 2 weeks now.   He reports this went well overall, has not noticed any significant impacts on his mood.  He is sleeping 8-9 hours per night.  Mood has been \"ok.\"  Has been experiencing some anxiety and rumination. Themes from rumination are often on self doubt, self-criticism, and social situations.  Has felt out of place in social situations and that he is not fully himself. Sees friends once every couple of weeks, many are at college.  Does enjoy his time with them. Is not dating but would like to be and has been reflecting on the role of relationships in his life.    -Lithium level of 0.75 on 2/3 with 1500 mg daily dose.  He has noticed a slight increase in thirst on this dose.    -Mood has remained stable.  Denies persistent depression or symptoms of joanna.  Sleep is improved with lower olanzapine dose (reduced from 10-11 hrs to 8-9 hrs). It takes 30-60 min to fall asleep most nights.  Does spend time on his phone prior to bed.   Denies death ideation or SI. Denies psychosis.  -No substance since last visit     Medication SE:  Weight gain of 40 lbs since manic episode.  Baseline weight is 170 lbs, currently 206 lbs.  Appetite is increased but improved with metformin + dose reduction of olanzapine, has noticed sensation of being full has returned.  Sedation as above. Denies N/V/D or constipation.  Has possibly had some increase in thirst +  urination frequency.  Drinks 96 oz daily. Denies tremor, cognitive SE, sexual SE.      /76   " Pulse 76   Wt 92.9 kg (204 lb 12.8 oz)       Current psychiatric medications  Bupropion  mg daily   Lithium 1050 mg at bedtime  Olanzapine 5 mg at bedtime  Metformin 500 mg daily     Recent Substance Use:    Alcohol - 1x per month, 4 drinks per occasion  Cannabis - 1x per month, 10 mg of delta-9, none since last visit    SOCIAL and FAMILY HISTORY   (no change today)                                              per pt report         As documented in 12/3 note by Hanh Wilson, reviewed and updated as needed today.   Family Hx:  Mother eating disorder, anxiety and depression  reports history of completed suicides. Uncle completed suicide.    Social Hx:    Living situation: Yoseph currently lives in [place] with parents. Yoseph reports home life is going well.  Housing Type: house with parents  Guns, weapons, or other means to harm oneself in the home?  Unknown  Relationships: Significant relationships include Parents and friend Brigido.        Education: Yoseph's highest level of education is high school graduate. Was going to school for Music Education but switched to Elementary Education. Educational goals include finishing elementary education degree and returning to [employer]  Occupation: .  Occupational goals include to get an additional job and to eventually go back to school for elementary education.  Spiritual considerations: Patient does not identify with ped[place] l[place] community.  Is spiritual but is not as into it as he used to be.   Cultural influences: Yoseph describes their race as white and [place]. Yoseph's primary spoken language is English. Yoseph identifies their sexual orientation as questioning, and their gender as male. Yoseph prefers he/they pronouns.   Current Stressors: Living at home with parents, feelings of loneliness and depression.   Strengths & Opportunities:  Hobbies and enjoyable activities include Reading and Listening to Music. Spending time outside and playing  video games. Likes to read comic books. Self identified strengths are good with people and social. Exercise and nutrition habits include worries about gaining weight from the Wellbutrin.  Coping mechanisms include Meditation, Therapy, and Family.   Legal Hx: Yes: got caught with an edbile and had to do a class online    Trauma and/or Abuse Hx: There are no indications or report of: significant losses, trauma, abuse or neglect. Issues of possible neglect are not present. Identifies the hospital as a traumatic experience.   Hx: No    PAST PSYCH and SUBSTANCE USE HISTORY                      -Remembers first feeling depressed in the 7th grade.  Recalls approx 3 episodes of depression per year, lasting approx 1 month.   Felt he had some depression at baseline and then outside factors would make it worse.  Describes these episodes as low mood, anhedonia, negative thinking about himself (feeling he was a bad person, something wrong with him), low energy, hypersomnia (12 hrs per day).  He has experienced SI in the past with depression episodes.  Denies ever experiencing suicide intent or plan.  Was started on prozac in 2020, which helped prevent more significant depressions, but did not help with baseline depression and also made him feel numb.  He took prozac until some time in 2023, does not recall the dose.  Was prescribed by his pediatrician.  After coming off the prozac his mood got worse.  He had two more episodes of depressions in the context of interpersonal stressors (difficult roommate situation and a break up).  He sought care at the [place] [facility] and was started on Zoloft in April 2023.  Highest dose was 25-50 mg.  During this period he was using cannabis (delta-8 gummies) once every few weeks, and alcohol once every month. Highest use of cannabis was in the summer of 2023, daily use, but when he started the semester again he reduced use.  Denies any impact on mood or psychosis symptoms with  cannabis use.   -He reports within 3-4 wks of starting zoloft, he began to experience an increase in energy and a decrease in sleep (3 hrs or less per night, sometimes not sleeping at all).  Mood was elevated.  One night he took a cannabis edible, and the next morning he tarted to experince symptoms of psychosis, including AH (characterized by positive conversations with people he knows), unusual beliefs that he thought he had superpowers (could live forever, could fly), ability to telepathically communicate,  concerned the government would find him and want to do research on his new abilities.  Had been texting his ex girlfriend strange content, including that he had killed  someone, and she called the police.  He was  taken to the ED and then admitted to to inpatient psychiatry.  Symptoms began to stabilize with olanzapine, ativan, and lithium.  Was held on a 72 hr hold for some of his stay    Psychiatric Hospitalizations: One hospitalization in May 2024 at [facility] for first episode of joanna, 5/2 - 5/9/24  Commitment: No, Current Castillo order: No  History of Electroconvulsive Therapy (ECT) or Transcranial Magnetic Stimulation (TMS): No     Self-Injurious Behavior: Denies  Suicidal Ideation Hx: Yes - has history of ideation but has called suicide hotline  Suicide Attempt- #-:No  Violence/Aggression Hx: No    Past medication trials:  Prozac  Sertraline 25-50 mg, precipitated joanna  Ativan while admitted    Recent medication changes:  2/6/25 decrease olanzapine from 10 mg to 5 mg daily      Outpatient Programs & Services [Psychotherapy, DBT, Day Treatment, Eating Disorder Tx, etc]:   Current:  Currently has an outpatient therapist. Cm Don as psychiatrist at [facility]     Past:  Has had previous therapists.Had previous hospitalization for manic episode.     Substance Use:  Past Use - Alcohol-  intermittent   and Cannabis-  monthly use since college, summer 2023 highest use at most days    No hx of substance use  treatment     MEDICAL HISTORY     There is no problem list on file for this patient.    Concussion in 2016, swimming     ALLERGIES: Patient has no known allergies.     MEDICAL REVIEW OF SYSTEMS                                                                  A comprehensive review of systems was performed and is negative other than noted in the HPI.    CURRENT MEDS       Current Outpatient Medications   Medication Sig Dispense Refill    buPROPion (WELLBUTRIN XL) 150 MG 24 hr tablet Take 1 tablet (150 mg) by mouth every morning. 30 tablet 2    lithium (ESKALITH) 150 MG capsule Take 1 capsule (150 mg) by mouth at bedtime. Take with three 300 mg tablets for a total bedtime dose of 1050 mg. 30 capsule 0    lithium 300 MG capsule Take 3 capsules (900 mg) by mouth at bedtime. 90 capsule 2    metFORMIN (GLUCOPHAGE) 500 MG tablet Take 1 tablet (500 mg) by mouth daily (with breakfast). 30 tablet 1    OLANZapine (ZYPREXA) 5 MG tablet Take 1.5 tablets (7.5 mg) by mouth at bedtime. 45 tablet 2       VITALS                                                                                              /76   Pulse 76   Wt 92.9 kg (204 lb 12.8 oz)     MENTAL STATUS EXAM                                                             Alertness: alert  and oriented  Appearance: casually groomed  Behavior/Demeanor: cooperative and pleasant, with good  eye contact   Speech: normal  Language: intact  Psychomotor: normal or unremarkable  Mood: description consistent with euthymia  Affect:  euthymic ; congruent to: mood- yes, content- yes  Thought Process/Associations: unremarkable  Thought Content:  Reports none;  Denies suicidal & violent ideation and delusions  Perception:  Reports none;  Denies hallucinations  Insight: adequate  Judgment: adequate for safety  Cognition: attention span: intact  concentration: intact  recent memory: intact  remote memory: intact  fund of knowledge: appropriate  Gait and Station: unremarkable    LABS  and DATA         1/8/2025    12:18 PM 1/16/2025    10:12 AM   PHQ   PHQ-9 Total Score 3  5    Q9: Thoughts of better off dead/self-harm past 2 weeks Not at all Not at all       Patient-reported       Recent Labs   Lab Test 01/23/25  1054   CR 1.01   GFRESTIMATED >90     No lab results found.      RISK STATEMENT for SAFETY   Yoseph Hameed did not appear to be an imminent safety risk to self or others.  Denies current SI, intent or plan.     TREATMENT RISK STATEMENT:  The risks, benefits, alternatives and potential adverse effects have been discussed and are understood by the pt. The pt understands the risks of using street drugs or alcohol. There are no medical contraindications, the pt agrees to treatment with the ability to do so. The pt knows to call the clinic for any problems or to access emergency care if needed.  Medical and substance use concerns are documented above.  Psychotropic drug interaction check was done, including changes made today.    PROVIDER:  JACK Casiano CNP      Psychiatry Individual Psychotherapy Note   Psychotherapy start time -940  Psychotherapy end time - 956  Date treatment plan last reviewed with patient - 2/20/2025    Subjective: This supportive psychotherapy session addressed issues related to goals of therapy and current psychosocial stressors. Patient's reaction: Action in the context of mental status appropriate for ambulatory setting.    Interactive complexity indicated? No  Plan: RTC in timeframe noted above  Psychotherapy services during this visit included myself and the patient.   Treatment Plan      SYMPTOMS; PROBLEMS   MEASURABLE GOALS;    FUNCTIONAL IMPROVEMENT / GAINS INTERVENTIONS DISCHARGE CRITERIA   Bipolar Disorder   Prevent joanna, depression  Learn skills for managing mood symptoms long term Supportive / psychodynamic marked symptom improvement            Medical decision making     Level of Medical Decision Making:   - At least 1 chronic problem that  is not stable  - Engaged in prescription drug management during visit (discussed any medication benefits, side effects, alternatives, etc.)          The longitudinal plan of care for the diagnosis(es)/condition(s) as documented were addressed during this visit. Due to the added complexity in care, I will continue to support Yoseph in the subsequent management and with ongoing continuity of care.

## 2025-02-20 NOTE — PATIENT INSTRUCTIONS
**For crisis resources, please see the information at the end of this document**   Patient Education    Thank you for coming to the [facility] [place] [facility].     Lab Testing:  If you had lab testing today and your results are reassuring or normal they will be mailed to you or sent through [employer] within 7 days. If the lab tests need quick action we will call you with the results. The phone number we will call with results is # [phone]. If this is not the best number please call our clinic and change the number.     Medication Refills:  If you need any refills please call your pharmacy and they will contact us. Our fax number for refills is [phone].   Three business days of notice are needed for general medication refill requests.   Five business days of notice are needed for controlled substance refill requests.   If you need to change to a different pharmacy, please contact the new pharmacy directly. The new pharmacy will help you get your medications transferred.     Contact Us:  Please call [phone] during business hours (8-5:00 M-F).   If you have medication related questions after clinic hours, or on the weekend, please call [phone].     Financial Assistance [phone]   Medical Records [phone]       MENTAL HEALTH CRISIS RESOURCES:  For a emergency help, please call 911 or go to the nearest Emergency Department.     Emergency Walk-In Options:   EmPATH Unit @ [facility] ([place]): [phone] - Specialized mental health emergency area designed to be calming  [facility] ([place]): [phone]  [facility] ([place]): [phone]  [facility]): [phone]    [facility] Crisis Information:   [place]: [phone]  [place]: [phone]  [place] ([place]) - Adult: [phone]     Child: [phone]  [place] - Adult: [phone]     Child: [phone]  [place]:  [phone]  List of all [facility] resources:   https://[place].gov/dhs/people-we-serve/adults/health-care/mental-health/resources/crisis-contacts.jsp    National Crisis Information:   Crisis Text Line: Text  [place]  to 994649  Suicide & Crisis Lifeline: 988  National Suicide Prevention Lifeline: 7-790-296-TALK (1-421.288.1573)       For online chat options, visit https://suicidepreventionlifeline.org/chat/  Poison Control Center: 8-457-712-1957  Trans Lifeline: 3-459-106-7127 - Hotline for transgender people of all ages  The Ricardo Project: 7-781-462-6431 - Hotline for LGBT youth     For Non-Emergency Support:   Fast Tracker: Mental Health & Substance Use Disorder Resources -   https://www."[employer]ckJobSyncn.org/

## 2025-02-26 ENCOUNTER — OFFICE VISIT (OUTPATIENT)
Dept: FAMILY MEDICINE | Facility: CLINIC | Age: 20
End: 2025-02-26
Payer: COMMERCIAL

## 2025-02-26 ENCOUNTER — ANCILLARY PROCEDURE (OUTPATIENT)
Dept: GENERAL RADIOLOGY | Facility: CLINIC | Age: 20
End: 2025-02-26
Attending: PHYSICIAN ASSISTANT
Payer: COMMERCIAL

## 2025-02-26 VITALS
WEIGHT: 201 LBS | RESPIRATION RATE: 16 BRPM | BODY MASS INDEX: 28.14 KG/M2 | HEIGHT: 71 IN | DIASTOLIC BLOOD PRESSURE: 69 MMHG | HEART RATE: 99 BPM | TEMPERATURE: 98.8 F | SYSTOLIC BLOOD PRESSURE: 121 MMHG | OXYGEN SATURATION: 100 %

## 2025-02-26 DIAGNOSIS — Z00.00 ROUTINE GENERAL MEDICAL EXAMINATION AT A HEALTH CARE FACILITY: Primary | ICD-10-CM

## 2025-02-26 DIAGNOSIS — G89.29 CHRONIC BILATERAL LOW BACK PAIN WITHOUT SCIATICA: ICD-10-CM

## 2025-02-26 DIAGNOSIS — M54.50 CHRONIC BILATERAL LOW BACK PAIN WITHOUT SCIATICA: ICD-10-CM

## 2025-02-26 DIAGNOSIS — F31.12 BIPOLAR AFFECTIVE DISORDER, CURRENTLY MANIC, MODERATE (H): ICD-10-CM

## 2025-02-26 DIAGNOSIS — E66.3 OVERWEIGHT (BMI 25.0-29.9): ICD-10-CM

## 2025-02-26 PROCEDURE — 72100 X-RAY EXAM L-S SPINE 2/3 VWS: CPT | Mod: TC | Performed by: RADIOLOGY

## 2025-02-26 SDOH — HEALTH STABILITY: PHYSICAL HEALTH: ON AVERAGE, HOW MANY DAYS PER WEEK DO YOU ENGAGE IN MODERATE TO STRENUOUS EXERCISE (LIKE A BRISK WALK)?: 6 DAYS

## 2025-02-26 SDOH — HEALTH STABILITY: PHYSICAL HEALTH: ON AVERAGE, HOW MANY MINUTES DO YOU ENGAGE IN EXERCISE AT THIS LEVEL?: 40 MIN

## 2025-02-26 ASSESSMENT — PAIN SCALES - GENERAL: PAINLEVEL_OUTOF10: NO PAIN (0)

## 2025-02-26 ASSESSMENT — SOCIAL DETERMINANTS OF HEALTH (SDOH): HOW OFTEN DO YOU GET TOGETHER WITH FRIENDS OR RELATIVES?: TWICE A WEEK

## 2025-02-26 NOTE — PROGRESS NOTES
"Preventive Care Visit  [facility] JUMAMOCLIVE Monsivais PA-C, Family Medicine  Feb 26, 2025      Assessment & Plan     Routine general medical examination at a health care facility    Bipolar affective disorder, currently manic, moderate (H)  Chronic, stable, following with psychiatry    Chronic bilateral low back pain without sciatica  Intermittent low back pain for several years, no red flag symptoms, normal exam today. X-ray obtained due to duration and is unremarkable. Recommend physical therapy, offered referral but he declines today. Discussed return precautions, red flag symptoms. Patient agreeable.  - XR Lumbar Spine 2/3 Views; Future    Overweight (BMI 25.0-29.9)  BMI  Estimated body mass index is 28.03 kg/m  as calculated from the following:    Height as of this encounter: 1.803 m (5' 11\").    Weight as of this encounter: 91.2 kg (201 lb).   Weight management plan: Discussed healthy diet and exercise guidelines    Counseling  Appropriate preventive services were discussed with this patient.  Checklist reviewing preventive services available has been given to the patient.    The longitudinal plan of care for the diagnosis(es)/condition(s) as documented were addressed during this visit. Due to the added complexity in care, I will continue to support Yoseph in the subsequent management and with ongoing continuity of care.      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 Off and on for years   Have you seen this provider for your pain in the past? No   Where in your body do your have pain? Low back pain  Are you seeing anyone else for your pain? No  What makes your pain better? Stretching  What makes your pain worse? Bending, sleeping wrong  How has pain affected your ability to work? 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          1/8/2025    12:18 PM 1/16/2025    10:12 AM   PHQ-9 SCORE   PHQ-9 Total Score MyChart 3 (Minimal depression) 5 (Mild depression)   PHQ-9 Total Score 3  5        Patient-reported     Health Care Directive  Patient does not have a Health Care Directive:       2/26/2025   General Health   How would you rate your overall physical health? (!) FAIR   Feel stress (tense, anxious, or unable to sleep) Only a little   (!) STRESS CONCERN      2/26/2025   Nutrition   Three or more servings of calcium each day? (!) NO   Diet: Regular (no restrictions)   How many servings of fruit and vegetables per day? (!) 0-1   How many sweetened beverages each day? 0-1         2/26/2025   Exercise   Days per week of moderate/strenous exercise 6 days   Average minutes spent exercising at this level 40 min         2/26/2025   Social Factors   Frequency of gathering with friends or relatives Twice a week   Worry food won't last until get money to buy more No   Food not last or not have enough money for food? No   Do you have housing? (Housing is defined as stable permanent housing and does not include staying  ouside in a car, in a tent, in an abandoned building, in an overnight shelter, or couch-surfing.) Yes   Are you worried about losing your housing? No   Lack of transportation? No   Unable to get utilities (heat,electricity)? No         2/26/2025   Dental   Dentist two times every year? (!) NO              Today's PHQ-2 Score:       1/23/2025    10:03 AM   PHQ-2 ( 1999 [employer])   Q1: Little interest or pleasure in doing things 1   Q2: Feeling down, depressed or hopeless 0   PHQ-2 Score 1    Q1: Little interest or pleasure in doing things Several days   Q2: Feeling down, depressed or hopeless Not at all   PHQ-2 Score 1       Patient-reported         2/26/2025   Substance Use   Alcohol more than 3/day or more than 7/wk No   Do you use any other substances recreationally? No     Social History     Tobacco Use    Smoking status: Never     Passive exposure: Never    Smokeless tobacco: Never   Vaping Use    Vaping status: Never Used   Substance Use Topics    Alcohol use: Not Currently     Comment: very rare    Drug use: Not Currently     Types: Marijuana             2/26/2025   One time HIV Screening   Previous HIV test? I don't know         2/26/2025   STI Screening   New sexual partner(s) since last STI/HIV test? (!) YES          2/26/2025   Contraception/Family Planning   Questions about contraception or family planning No        Reviewed and updated as needed this visit by Provider   Tobacco  Allergies  Meds  Problems  Med Hx  Surg Hx  Fam Hx            History reviewed. No pertinent past medical history.  Past Surgical History:   Procedure Laterality Date    TONSILLECTOMY       Labs reviewed in EPIC  BP Readings from Last 3 Encounters:   02/26/25 121/69    Wt Readings from Last 3 Encounters:   02/26/25 91.2 kg (201 lb)   04/28/12 29.8 kg (65 lb 11.2 oz) (67%, Z= 0.44)*     * Growth percentiles are based on CDC (Boys, 2-20 Years) data.                  Patient Active Problem List   Diagnosis    Bipolar affective  disorder, currently manic, moderate (H)    Overweight (BMI 25.0-29.9)     Past Surgical History:   Procedure Laterality Date    TONSILLECTOMY         Social History     Tobacco Use    Smoking status: Never     Passive exposure: Never    Smokeless tobacco: Never   Substance Use Topics    Alcohol use: Not Currently     Comment: very rare     Family History   Problem Relation Age of Onset    Anxiety Disorder Mother     Mental Illness Mother     Obesity Father     Depression Brother     Mental Illness Brother     Diabetes Maternal Grandmother     Hypertension Maternal Grandmother     Breast Cancer Maternal Grandmother     Obesity Maternal Grandmother     Cancer Maternal Grandmother         unknown    Cerebrovascular Disease Maternal Grandfather     Hypertension Paternal Grandmother         Espena    Obesity Paternal Grandmother     Diabetes Paternal Grandfather     Hypertension Paternal Grandfather     Hyperlipidemia Paternal Grandfather     Colon Cancer No family hx of     Prostate Cancer No family hx of          Current Outpatient Medications   Medication Sig Dispense Refill    buPROPion (WELLBUTRIN XL) 150 MG 24 hr tablet Take 1 tablet (150 mg) by mouth every morning. 30 tablet 2    lithium (ESKALITH) 150 MG capsule Take 1 capsule (150 mg) by mouth at bedtime. Take with three 300 mg tablets for a total bedtime dose of 1050 mg. 30 capsule 0    lithium 300 MG capsule Take 3 capsules (900 mg) by mouth at bedtime. 90 capsule 2    metFORMIN (GLUCOPHAGE) 500 MG tablet Take 1 tablet (500 mg) by mouth daily (with breakfast). 30 tablet 1    OLANZapine (ZYPREXA) 5 MG tablet Take 1.5 tablets (7.5 mg) by mouth at bedtime. (Patient taking differently: Take 5 mg by mouth at bedtime.) 45 tablet 2     No Known Allergies  Recent Labs   Lab Test 01/23/25  1054   CR 1.01   GFRESTIMATED >90   POTASSIUM 4.4          Review of Systems  Constitutional, HEENT, cardiovascular, pulmonary, gi and gu systems are negative, except as otherwise  "noted.     Objective    Exam  /69   Pulse 99   Temp 98.8  F (37.1  C)   Resp 16   Ht 1.803 m (5' 11\")   Wt 91.2 kg (201 lb)   SpO2 100%   BMI 28.03 kg/m     Estimated body mass index is 28.03 kg/m  as calculated from the following:    Height as of this encounter: 1.803 m (5' 11\").    Weight as of this encounter: 91.2 kg (201 lb).    Physical Exam  GENERAL: alert and no distress  EYES: Eyes grossly normal to inspection, PERRL and conjunctivae and sclerae normal  HENT: ear canals and TM's normal, nose and mouth without ulcers or lesions  NECK: no adenopathy, no asymmetry, masses, or scars  RESP: lungs clear to auscultation - no rales, rhonchi or wheezes  CV: regular rate and rhythm, normal S1 S2, no S3 or S4, no murmur, click or rub, no peripheral edema  ABDOMEN: soft, nontender, no hepatosplenomegaly, no masses and bowel sounds normal  MS: no gross musculoskeletal defects noted, no edema  Comprehensive back pain exam:  No midline spinal or paralumbar muscle tenderness, Range of motion not limited by pain, Lower extremity strength functional and equal on both sides, Lower extremity reflexes within normal limits bilaterally, Lower extremity sensation normal and equal on both sides, and Straight leg raise negative bilaterally  SKIN: no suspicious lesions or rashes  NEURO: Normal strength and tone, mentation intact and speech normal  PSYCH: mentation appears normal, affect normal/bright    Lumbar spine xray: normal per my read. Per radiology, \"No fracture. Normal vertebral heights and alignment. Normal disc spaces and facets for age. Normal extraspinal structures.\"    Signed Electronically by: Adrienne Monsivais PA-C    "

## 2025-02-26 NOTE — PATIENT INSTRUCTIONS
Patient Education   Preventive Care Advice   This is general advice given by our system to help you stay healthy. However, your care team may have specific advice just for you. Please talk to your care team about your preventive care needs.  Nutrition  Eat 5 or more servings of fruits and vegetables each day.  Try wheat bread, brown rice and whole grain pasta (instead of white bread, rice, and pasta).  Get enough calcium and vitamin D. Check the label on foods and aim for 100% of the RDA (recommended daily allowance).  Lifestyle  Exercise at least 150 minutes each week  (30 minutes a day, 5 days a week).  Do muscle strengthening activities 2 days a week. These help control your weight and prevent disease.  No smoking.  Wear sunscreen to prevent skin cancer.  Have a dental exam and cleaning every 6 months.  Yearly exams  See your health care team every year to talk about:  Any changes in your health.  Any medicines your care team has prescribed.  Preventive care, family planning, and ways to prevent chronic diseases.  Shots (vaccines)   HPV shots (up to age 26), if you've never had them before.  Hepatitis B shots (up to age 59), if you've never had them before.  COVID-19 shot: Get this shot when it's due.  Flu shot: Get a flu shot every year.  Tetanus shot: Get a tetanus shot every 10 years.  Pneumococcal, hepatitis A, and RSV shots: Ask your care team if you need these based on your risk.  Shingles shot (for age 50 and up)  General health tests  Diabetes screening:  Starting at age 35, Get screened for diabetes at least every 3 years.  If you are younger than age 35, ask your care team if you should be screened for diabetes.  Cholesterol test: At age 39, start having a cholesterol test every 5 years, or more often if advised.  Bone density scan (DEXA): At age 50, ask your care team if you should have this scan for osteoporosis (brittle bones).  Hepatitis C: Get tested at least once in your life.  STIs (sexually  transmitted infections)  Before age 24: Ask your care team if you should be screened for STIs.  After age 24: Get screened for STIs if you're at risk. You are at risk for STIs (including HIV) if:  You are sexually active with more than one person.  You don't use condoms every time.  You or a partner was diagnosed with a sexually transmitted infection.  If you are at risk for HIV, ask about PrEP medicine to prevent HIV.  Get tested for HIV at least once in your life, whether you are at risk for HIV or not.  Cancer screening tests  Cervical cancer screening: If you have a cervix, begin getting regular cervical cancer screening tests starting at age 21.  Breast cancer scan (mammogram): If you've ever had breasts, begin having regular mammograms starting at age 40. This is a scan to check for breast cancer.  Colon cancer screening: It is important to start screening for colon cancer at age 45.  Have a colonoscopy test every 10 years (or more often if you're at risk) Or, ask your provider about stool tests like a FIT test every year or Cologuard test every 3 years.  To learn more about your testing options, visit:   .  For help making a decision, visit:   https://bit.ly/qm91763.  Prostate cancer screening test: If you have a prostate, ask your care team if a prostate cancer screening test (PSA) at age 55 is right for you.  Lung cancer screening: If you are a current or former smoker ages 50 to 80, ask your care team if ongoing lung cancer screenings are right for you.  For informational purposes only. Not to replace the advice of your health care provider. Copyright   2023 [facility]. All rights reserved. Clinically reviewed by the [facility] Transitions Program. [employer] [id number] - REV 01/24.  Safer Sex: Care Instructions  Overview  Safer sex is a way to reduce your risk of getting a sexually transmitted infection (STI). It can also help prevent pregnancy.  Several products can help you practice  safer sex and reduce your chance of STIs. One of the best is a condom. There are internal and external condoms. You can use a special rubber sheet (dental dam) for protection during oral sex. Disposable gloves can keep your hands from touching blood, semen, or other body fluids that can carry infections.  Remember that birth control methods such as diaphragms, IUDs, foams, and birth control pills do not stop you from getting STIs.  Follow-up care is a key part of your treatment and safety. Be sure to make and go to all appointments, and call your doctor if you are having problems. It's also a good idea to know your test results and keep a list of the medicines you take.  How can you care for yourself at home?  Think about getting vaccinated to help prevent hepatitis A, hepatitis B, and human papillomavirus (HPV). They can be spread through sex.  Use a condom every time you have sex. Use an external condom, which goes on the penis. Or use an internal condom, which goes into the vagina or anus.  Make sure you use the right size external condom. A condom that's too small can break easily. A condom that's too big can slip off during sex.  Use a new condom each time you have sex. Be careful not to poke a hole in the condom when you open the wrapper.  Don't use an internal condom and an external condom at the same time.  Never use petroleum jelly (such as Vaseline), grease, hand lotion, baby oil, or anything with oil in it. These products can make holes in the condom.  After intercourse, hold the edge of the condom as you remove it. This will help keep semen from spilling out of the condom.  Do not have sex with anyone who has symptoms of an STI, such as sores on the genitals or mouth.  Do not drink a lot of alcohol or use drugs before sex.  Limit your sex partners. Sex with one partner who has sex only with you can reduce your risk of getting an STI.  Don't share sex toys. But if you do share them, use a condom and clean  "the sex toys between each use.  Talk to any partners before you have sex. Talk about what you feel comfortable with and whether you have any boundaries with sex. And find out if your partner or partners may be at risk for any STI. Keep in mind that a person may be able to spread an STI even if they do not have symptoms. You and any partners may want to get tested for STIs.  Where can you learn more?  Go to https://www.[employer].net/patiented  Enter B608 in the search box to learn more about \"Safer Sex: Care Instructions.\"  Current as of: April 30, 2024  Content Version: 14.3    2024 [employer].   Care instructions adapted under license by your healthcare professional. If you have questions about a medical condition or this instruction, always ask your healthcare professional. [employer] disclaims any warranty or liability for your use of this information.       "

## 2025-02-28 ENCOUNTER — VIRTUAL VISIT (OUTPATIENT)
Dept: PSYCHIATRY | Facility: CLINIC | Age: 20
End: 2025-02-28
Attending: PSYCHOLOGIST
Payer: COMMERCIAL

## 2025-02-28 DIAGNOSIS — Z79.899 HIGH RISK MEDICATIONS (NOT ANTICOAGULANTS) LONG-TERM USE: Primary | ICD-10-CM

## 2025-02-28 ASSESSMENT — PATIENT HEALTH QUESTIONNAIRE - PHQ9
10. IF YOU CHECKED OFF ANY PROBLEMS, HOW DIFFICULT HAVE THESE PROBLEMS MADE IT FOR YOU TO DO YOUR WORK, TAKE CARE OF THINGS AT HOME, OR GET ALONG WITH OTHER PEOPLE: SOMEWHAT DIFFICULT
SUM OF ALL RESPONSES TO PHQ QUESTIONS 1-9: 4
SUM OF ALL RESPONSES TO PHQ QUESTIONS 1-9: 4

## 2025-03-06 ENCOUNTER — OFFICE VISIT (OUTPATIENT)
Dept: PSYCHIATRY | Facility: CLINIC | Age: 20
End: 2025-03-06
Attending: NURSE PRACTITIONER
Payer: COMMERCIAL

## 2025-03-06 VITALS
SYSTOLIC BLOOD PRESSURE: 133 MMHG | HEART RATE: 67 BPM | DIASTOLIC BLOOD PRESSURE: 83 MMHG | BODY MASS INDEX: 28.59 KG/M2 | WEIGHT: 205 LBS

## 2025-03-06 DIAGNOSIS — F31.9 BIPOLAR 1 DISORDER (H): ICD-10-CM

## 2025-03-06 PROCEDURE — 99214 OFFICE O/P EST MOD 30 MIN: CPT | Performed by: NURSE PRACTITIONER

## 2025-03-06 RX ORDER — LITHIUM CARBONATE 300 MG/1
900 CAPSULE ORAL AT BEDTIME
Qty: 90 CAPSULE | Refills: 2 | Status: SHIPPED | OUTPATIENT
Start: 2025-03-06

## 2025-03-06 RX ORDER — LITHIUM CARBONATE 150 MG/1
150 CAPSULE ORAL AT BEDTIME
Qty: 30 CAPSULE | Refills: 0 | Status: SHIPPED | OUTPATIENT
Start: 2025-03-06

## 2025-03-06 RX ORDER — OLANZAPINE 2.5 MG/1
2.5 TABLET, FILM COATED ORAL AT BEDTIME
Qty: 30 TABLET | Refills: 2 | Status: SHIPPED | OUTPATIENT
Start: 2025-03-06

## 2025-03-06 RX ORDER — BUPROPION HYDROCHLORIDE 150 MG/1
150 TABLET ORAL EVERY MORNING
Qty: 30 TABLET | Refills: 2 | Status: SHIPPED | OUTPATIENT
Start: 2025-03-06

## 2025-03-06 ASSESSMENT — PAIN SCALES - GENERAL: PAINLEVEL_OUTOF10: NO PAIN (0)

## 2025-03-06 NOTE — PROGRESS NOTES
[facility]  Psychiatry Clinic  PSYCHIATRY CLINIC FOLLOW UP VISIT     CARE TEAM:  PCP- Specialists, [facility]   Yoseph is a 19 year old who prefers the name Yoseph and uses pronouns he, they.     DIAGNOSES, ASSESSMENT & PLAN                                                                                          Diagnostic Impressions (provisional)  Bipolar 1 Disorder with psychosis, mre depressed    Yoseph is a 19 year old male who reports a psychiatric history significant for multiple episodes of depressions with onset at approx age 12-13, and a first episode of joanna in May 2024 with inpatient admission at [facility].  There is a family history of depression and suicide.  Manic episode occurred in the context of recently starting zoloft + intermittent cannabis use.  He was stabilized on lithium and olanzapine.  Following resolution of joanna he experienced an episode of depression which improved with Wellbutrin.    -Today, he reports mood is stable overall and he tolerated recent dose reduction of olanzapine without concern for mood symptoms.  He has gained approx 40 lbs since starting olanzapine and would like to eventually taper off of this medication.  Lithium is now WNL with recent dose increase.  We will continue olanzapine taper today, reducing dose to 2.5 mg daily.  Will also increase metformin today.   He denies SI/intent or plan and risk of harm to self or others is low.     Bipolar disorder  Continue Bupropion  mg daily   Continue Lithium 1050 mg at bedtime  Decrease Olanzapine to 2.5 mg at bedtime     SGA induced weight gain  Increase metformin 500 mg daily BID    Lithium, SGA monitoring  9/25/24: TSH/T4, lipid panel  1/23/25: BMP  2/3/25: Lithium level        RTC: 4 weeks or sooner if needed   CRISIS Numbers:   Provided routinely in AVS        CHIEF CONCERN                                Bipolar disorder, medication SE    Interim  "History                                                   -Yoseph is a 19 year old who recently enrolled in the [facility] early bipolar disorder program.  DA with me was on 1/16.  He was diagnosed with bipolar disorder during an inpatient admission from [facility], where he was admitted from 5/2 - 5/9/24.    -Last seen by me on 2/20 at which no medication changes were made; olanzapine dose decreased approx 2 weeks prior.   He reports he has not noticed any significant impacts on his mood.  He is sleeping 10 hours per night.  It takes him a little longer to fall asleep at night and is tired in the morning.  Takes approx with 45 min to fall asleep.    Mood has been \"ok.\"  Has been experiencing some anxiety and rumination, although this is improved over the last 2 weeks. Themes from rumination are often on self doubt, self-criticism, and social situations.  Has felt out of place in social situations and that he is not fully himself. Sees friends once every couple of weeks, many are at college.  Has been reflecting on upcoming anniversary of first joanna episode.   -Lithium level of 0.75 on 2/3 with 1500 mg daily dose.  He has noticed a slight increase in thirst on this dose.    -Mood has remained stable.  Denies persistent depression or symptoms of joanna.  Denies death ideation or SI. Denies psychosis.  -No substance since last visit   -Has recently started going to the gym more.     Medication SE:  Weight gain of 40 lbs since manic episode.  Baseline weight is 170 lbs, currently 206 lbs.  Appetite is increased but improved with metformin + dose reduction of olanzapine, has noticed sensation of being full has returned.  Sedation as above. Denies N/V/D or constipation.  Has possibly had some increase in thirst +  urination frequency.  Drinks 96 oz daily. Denies tremor, cognitive SE, sexual SE.      /83   Pulse 67   Wt 93 kg (205 lb)   BMI 28.59 kg/m      Current psychiatric medications  Bupropion XL " 150 mg daily   Lithium 1050 mg at bedtime  Olanzapine 5 mg at bedtime  Metformin 500 mg daily     Recent Substance Use:    Alcohol - 1x per month, 4 drinks per occasion  Cannabis - 1x per month, 10 mg of delta-9, none since last visit    SOCIAL and FAMILY HISTORY   (no change today)                                              per pt report         As documented in 12/3 note by Hanh Wilson, reviewed and updated as needed today.   Family Hx:  Mother eating disorder, anxiety and depression  reports history of completed suicides. Uncle completed suicide.    Social Hx:    Living situation: Yoseph currently lives in [place] with parents. Yoseph reports home life is going well.  Housing Type: house with parents  Guns, weapons, or other means to harm oneself in the home?  Unknown  Relationships: Significant relationships include Parents and friend Brigido.        Education: Yoseph's highest level of education is high school graduate. Was going to school for Music Education but switched to Elementary Education. Educational goals include finishing elementary education degree and returning to [employer]  Occupation: .  Occupational goals include to get an additional job and to eventually go back to school for elementary education.  Spiritual considerations: Patient does not identify with [place] community.  Is spiritual but is not as into it as he used to be.   Cultural influences: Yoseph describes their race as white and [place]. Yoseph's primary spoken language is English. Yoseph identifies their sexual orientation as questioning, and their gender as male. Yoseph prefers he/they pronouns.   Current Stressors: Living at home with parents, feelings of loneliness and depression.   Strengths & Opportunities:  Hobbies and enjoyable activities include Reading and Listening to Music. Spending time outside and playing video games. Likes to read comic books. Self identified strengths are good with people and  social. Exercise and nutrition habits include worries about gaining weight from the Wellbutrin.  Coping mechanisms include Meditation, Therapy, and Family.   Legal Hx: Yes: got caught with an edbile and had to do a class online    Trauma and/or Abuse Hx: There are no indications or report of: significant losses, trauma, abuse or neglect. Issues of possible neglect are not present. Identifies the hospital as a traumatic experience.   Hx: No    PAST PSYCH and SUBSTANCE USE HISTORY   (no change today)                   -Remembers first feeling depressed in the 7th grade.  Recalls approx 3 episodes of depression per year, lasting approx 1 month.   Felt he had some depression at baseline and then outside factors would make it worse.  Describes these episodes as low mood, anhedonia, negative thinking about himself (feeling he was a bad person, something wrong with him), low energy, hypersomnia (12 hrs per day).  He has experienced SI in the past with depression episodes.  Denies ever experiencing suicide intent or plan.  Was started on prozac in 2020, which helped prevent more significant depressions, but did not help with baseline depression and also made him feel numb.  He took prozac until some time in 2023, does not recall the dose.  Was prescribed by his pediatrician.  After coming off the prozac his mood got worse.  He had two more episodes of depressions in the context of interpersonal stressors (difficult roommate situation and a break up).  He sought care at the [facility] and was started on Zoloft in April 2023.  Highest dose was 25-50 mg.  During this period he was using cannabis (delta-8 gummies) once every few weeks, and alcohol once every month. Highest use of cannabis was in the summer of 2023, daily use, but when he started the semester again he reduced use.  Denies any impact on mood or psychosis symptoms with cannabis use.   -He reports within 3-4 wks of starting zoloft inApril 2023, he  began to experience an increase in energy and a decrease in sleep (3 hrs or less per night, sometimes not sleeping at all).  Mood was elevated.  One night he took a cannabis edible, and the next morning he tarted to experince symptoms of psychosis, including AH (characterized by positive conversations with people he knows), unusual beliefs that he thought he had superpowers (could live forever, could fly), ability to telepathically communicate,  concerned the government would find him and want to do research on his new abilities.  Had been texting his ex girlfriend strange content, including that he had killed  someone, and she called the police.  He was  taken to the ED and then admitted to to inpatient psychiatry.  Symptoms began to stabilize with olanzapine, ativan, and lithium.  Was held on a 72 hr hold for some of his stay    Psychiatric Hospitalizations: One hospitalization in May 2024 at [facility] for first episode of joanna, 5/2 - 5/9/24  Commitment: No, Current Castillo order: No  History of Electroconvulsive Therapy (ECT) or Transcranial Magnetic Stimulation (TMS): No     Self-Injurious Behavior: Denies  Suicidal Ideation Hx: Yes - has history of ideation but has called suicide hotline  Suicide Attempt- #-:No  Violence/Aggression Hx: No    Past medication trials:  Prozac  Sertraline 25-50 mg, precipitated joanna  Ativan while admitted    Recent medication changes:  2/6/25 decrease olanzapine from 10 mg to 5 mg daily      Outpatient Programs & Services [Psychotherapy, DBT, Day Treatment, Eating Disorder Tx, etc]:   Current:  Currently has an outpatient therapist. Cm Don as psychiatrist at [facility]     Past:  Has had previous therapists.Had previous hospitalization for manic episode.     Substance Use:  Past Use - Alcohol-  intermittent   and Cannabis-  monthly use since college, summer 2023 highest use at most days    No hx of substance use treatment     MEDICAL HISTORY     Patient Active Problem List    Diagnosis    Bipolar affective disorder, currently manic, moderate (H)    Overweight (BMI 25.0-29.9)     Concussion in 2016, swimming     ALLERGIES: Patient has no known allergies.     MEDICAL REVIEW OF SYSTEMS                                                                  A comprehensive review of systems was performed and is negative other than noted in the HPI.    CURRENT MEDS       Current Outpatient Medications   Medication Sig Dispense Refill    buPROPion (WELLBUTRIN XL) 150 MG 24 hr tablet Take 1 tablet (150 mg) by mouth every morning. 30 tablet 2    lithium (ESKALITH) 150 MG capsule Take 1 capsule (150 mg) by mouth at bedtime. Take with three 300 mg tablets for a total bedtime dose of 1050 mg. 30 capsule 0    lithium 300 MG capsule Take 3 capsules (900 mg) by mouth at bedtime. 90 capsule 2    metFORMIN (GLUCOPHAGE) 500 MG tablet Take 1 tablet (500 mg) by mouth daily (with breakfast). 30 tablet 1    OLANZapine (ZYPREXA) 5 MG tablet Take 1.5 tablets (7.5 mg) by mouth at bedtime. (Patient taking differently: Take 5 mg by mouth at bedtime.) 45 tablet 2       VITALS                                                                                              /83   Pulse 67   Wt 93 kg (205 lb)   BMI 28.59 kg/m      MENTAL STATUS EXAM                                                             Alertness: alert  and oriented  Appearance: casually groomed  Behavior/Demeanor: cooperative and pleasant, with good  eye contact   Speech: normal  Language: intact  Psychomotor: normal or unremarkable  Mood: description consistent with euthymia  Affect:  euthymic ; congruent to: mood- yes, content- yes  Thought Process/Associations: unremarkable  Thought Content:  Reports none;  Denies suicidal & violent ideation and delusions  Perception:  Reports none;  Denies hallucinations  Insight: adequate  Judgment: adequate for safety  Cognition: attention span: intact  concentration: intact  recent memory: intact  remote  memory: intact  fund of knowledge: appropriate  Gait and Station: unremarkable    LABS and DATA         1/8/2025    12:18 PM 1/16/2025    10:12 AM 2/28/2025    12:12 PM   PHQ   PHQ-9 Total Score 3  5  4    Q9: Thoughts of better off dead/self-harm past 2 weeks Not at all Not at all Not at all       Patient-reported       Recent Labs   Lab Test 01/23/25  1054   CR 1.01   GFRESTIMATED >90     No lab results found.      RISK STATEMENT for SAFETY   Yoseph Hameed did not appear to be an imminent safety risk to self or others.  Denies current SI, intent or plan.     TREATMENT RISK STATEMENT:  The risks, benefits, alternatives and potential adverse effects have been discussed and are understood by the pt. The pt understands the risks of using street drugs or alcohol. There are no medical contraindications, the pt agrees to treatment with the ability to do so. The pt knows to call the clinic for any problems or to access emergency care if needed.  Medical and substance use concerns are documented above.  Psychotropic drug interaction check was done, including changes made today.    PROVIDER:  JACK Casiano CNP      Psychiatry Individual Psychotherapy Note   Psychotherapy start time -235  Psychotherapy end time - 251  Date treatment plan last reviewed with patient - 3/6/2025      Subjective: This supportive psychotherapy session addressed issues related to goals of therapy and current psychosocial stressors. Patient's reaction: Action in the context of mental status appropriate for ambulatory setting.    Interactive complexity indicated? No  Plan: RTC in timeframe noted above  Psychotherapy services during this visit included myself and the patient.   Treatment Plan      SYMPTOMS; PROBLEMS   MEASURABLE GOALS;    FUNCTIONAL IMPROVEMENT / GAINS INTERVENTIONS DISCHARGE CRITERIA   Bipolar Disorder   Prevent joanna, depression  Learn skills for managing mood symptoms long term Supportive / psychodynamic marked  symptom improvement            Medical decision making     Level of Medical Decision Making:   - At least 1 chronic problem that is not stable  - Engaged in prescription drug management during visit (discussed any medication benefits, side effects, alternatives, etc.)          The longitudinal plan of care for the diagnosis(es)/condition(s) as documented were addressed during this visit. Due to the added complexity in care, I will continue to support Yoseph in the subsequent management and with ongoing continuity of care.

## 2025-03-16 ENCOUNTER — OFFICE VISIT (OUTPATIENT)
Dept: URGENT CARE | Facility: URGENT CARE | Age: 20
End: 2025-03-16
Payer: COMMERCIAL

## 2025-03-16 VITALS
TEMPERATURE: 97.8 F | DIASTOLIC BLOOD PRESSURE: 81 MMHG | BODY MASS INDEX: 28.59 KG/M2 | RESPIRATION RATE: 26 BRPM | HEART RATE: 91 BPM | SYSTOLIC BLOOD PRESSURE: 133 MMHG | OXYGEN SATURATION: 95 % | WEIGHT: 205 LBS

## 2025-03-16 DIAGNOSIS — H66.003 ACUTE SUPPURATIVE OTITIS MEDIA OF BOTH EARS WITHOUT SPONTANEOUS RUPTURE OF TYMPANIC MEMBRANES, RECURRENCE NOT SPECIFIED: Primary | ICD-10-CM

## 2025-03-16 PROCEDURE — 3079F DIAST BP 80-89 MM HG: CPT | Performed by: NURSE PRACTITIONER

## 2025-03-16 PROCEDURE — 3075F SYST BP GE 130 - 139MM HG: CPT | Performed by: NURSE PRACTITIONER

## 2025-03-16 PROCEDURE — 99213 OFFICE O/P EST LOW 20 MIN: CPT | Performed by: NURSE PRACTITIONER

## 2025-03-16 RX ORDER — AMOXICILLIN 875 MG/1
875 TABLET, COATED ORAL 2 TIMES DAILY
Qty: 14 TABLET | Refills: 0 | Status: SHIPPED | OUTPATIENT
Start: 2025-03-16 | End: 2025-03-23

## 2025-03-16 NOTE — PROGRESS NOTES
Assessment & Plan     Acute suppurative otitis media of both ears without spontaneous rupture of tympanic membranes, recurrence not specified  - amoxicillin (AMOXIL) 875 MG tablet  Dispense: 14 tablet; Refill: 0     Patient Instructions   Amoxicillin twice a day for 7 days  Keep head out of the pool or use ear plugs when swimming.    Push fluids  Lots of handwashing.   Ibuprofen as needed for fever or pain  Delsym or dayquil/nyquil for cough as needed     Rest as able.   Will call if any other labs positive.    F/u in the clinic if symptoms persist or worsen.          Return in about 1 week (around 3/23/2025) for with regular provider if symptoms persist.    JACK Dumont CNP  [facility] U[facility]ENT [facility] LUIS Hameed is a 19 year old male who presents to clinic today for the following health issues:  Chief Complaint   Patient presents with    Urgent Care     Ear pain x 2 days, ear irritation,      HPI    URI Adult    Onset of symptoms was 3 day(s) ago.  Course of illness is worsening.    Severity moderately severe  Current and Associated symptoms: ear pain both  Treatment measures tried include Tylenol/Ibuprofen, Decongestants, Fluids, and Rest.  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  .    Review of Systems  Constitutional, HEENT, cardiovascular, pulmonary, GI, , musculoskeletal, neuro, skin, endocrine and psych systems are negative, except as otherwise noted.      Objective    /81   Pulse 91   Temp 97.8  F (36.6  C) (Tympanic)   Resp 26   Wt 93 kg (205 lb)   SpO2 95%   BMI 28.59 kg/m    Physical Exam   GENERAL: alert and no distress  EYES: Eyes grossly normal to inspection, PERRL and conjunctivae and sclerae normal  HENT: normal cephalic/atraumatic, both ears: erythematous, bulging membrane, and mucopurulent effusion, nose and mouth without ulcers or lesions, oropharynx clear, and oral mucous membranes moist  NECK: no adenopathy, no  asymmetry, masses, or scars  RESP: lungs clear to auscultation - no rales, rhonchi or wheezes  CV: regular rate and rhythm, normal S1 S2, no S3 or S4, no murmur, click or rub, no peripheral edema  MS: no gross musculoskeletal defects noted, no edema

## 2025-03-16 NOTE — PATIENT INSTRUCTIONS
Amoxicillin twice a day for 7 days  Keep head out of the pool or use ear plugs when swimming.    Push fluids  Lots of handwashing.   Ibuprofen as needed for fever or pain  Delsym or dayquil/nyquil for cough as needed     Rest as able.   Will call if any other labs positive.    F/u in the clinic if symptoms persist or worsen.

## 2025-03-17 ENCOUNTER — OFFICE VISIT (OUTPATIENT)
Dept: PSYCHIATRY | Facility: CLINIC | Age: 20
End: 2025-03-17
Attending: SOCIAL WORKER
Payer: COMMERCIAL

## 2025-03-17 DIAGNOSIS — F31.9 BIPOLAR 1 DISORDER (H): Primary | ICD-10-CM

## 2025-03-17 PROCEDURE — 90834 PSYTX W PT 45 MINUTES: CPT | Mod: U7

## 2025-03-17 NOTE — PROGRESS NOTES
[facility]  Psychiatry Clinic  First Episode of Bipolar - STRIDE Program  Increasing Resiliency in Life aka IRL (individual therapy)  Clinician Contact & Psychotherapy Progress Note      Patient: Yoseph Grayson (2005), MRN: [id number]  Date: 3/17/25  Diagnosis:   1. Bipolar 1 disorder (H)       Clinician: Mar Yang  Attendees: Patient      Service Type: Individual  Extended Session (60+ minutes): No  Interactive Complexity: No  Crisis: No    Length of Actual Contact: Start Time: 11:54; End Time: 12:47  In- Person at the [facility]: Yes      Data:  Progress on treatment objective & homework: Satisfactory progress - ACTION (Actively working towards change); Intervened by reinforcing change plan / affirming steps taken      Medication utilization: No changes to current psychiatric medication(s)   Notable medication updates/questions/concerns since our last visit include none.  Writer will notify assigned prescriber, RN team, and Pharmacist for follow-up.     Current / Ongoing Stressors and Urgent Concerns: Yoseph is looking to find a second job as he will need to begin paying his student loans next month.    Intervention:  Motivational Interviewing- Expressed Empathy/Understanding, Supported Autonomy, Collaboration, Evocation, Permission to raise concern or advise, Open-ended questions, Reflections: simple and complex, and Reframe / Response to change talk- E - Evoked more info from patient about behavior change, A - Affirmed patient's thoughts, decisions, or attempts at behavior change, R - Reflected patient's change talk, and S - Summarized patient's change talk statements  Educational Teaching Strategies- Review of written material/education  Relate information to client's experience  Ask questions to check comprehension  Break down information into small chunks  Adopt client's language   CBT Teaching Strategies- Coping skills training (review current  "coping skills and increase currently used skills), Pleasant Activity Scheduling (scheduling activities in the near future that you can look forward to, introduced more positivity around engagement in future activities, and reduce negative thinking about future activities), Relapse prevention planning (review of stressors and early warning signs), Recognizing the positive (visualize and discuss the best parts of the day to promote positive thinking patterns) , Play the Script Until the End (imagine the outcome of the worst case scenario, let the scenario play out, and recognize that even if everything feared happens, it will likely turn out okay), Relaxation training (model relaxation technique, practice technique, and feedback), Reinforcement and shaping (positive feedback for steps towards goals, gains in knowledge & skills, and follow-through on home assignments), and Behavioral Experiments (engage in a \"what if\" consideration, test existing beliefs, and test more adaptive beliefs)     Assessment/Progress Note:   Yoseph attended today's session. He denied any SI or safety concerns. Today we focused on Module 3 - Education about Bipolar. We discussed Yoseph's fears that his symptoms would come back and as a reframe we looked at the supportive factors  he has now versus during his episode this past fall and how those supports could help mitigate or help prevent the severity of any future episode. We also discussed how to tell the people in his life about his bipolar diagnosis. We will not meet next week as Yoseph will be on a family vacation in the [place]. We will meet again on Monday, March 31.    Focus for today's session: Module 3 - Education about Bipolar    Assessed symptom presence and potential triggers for the patient.  Yoseph reports that symptoms include  none , which is stable. Our continued goal is promoting return to functioning in emotional regulation, thought process and social " relationships, attaining control over disturbing thoughts, improve coping skills to deal with stress and interpersonal relationships, provide supportive therapy, psychoeducation, and understanding stressors that trigger psychotic episodes.      Today we reviewed Module 3 - Education about Bipolar.  Education about Bipolar includes discussing symptoms and common characteristics of bipolar, reviewing medications commonly prescribed for bipolar, discussing coping with stress and identifying coping strategies as well as exploring strategies to continue building resilience within Yoseph.     Writer used a relational and interpersonal approach to explore feelings, motivations, and behavior. Writer offered support, feedback, validation, and reinforced use of skills taught in IRT from modalities including cognitive behavioral therapy, psycho education, and skills training. Promoted understanding of their experiences of psychosis and how it impacts them in important areas of their life and in recovery goals. Reflected on client's strengths and resiliency factors and facilitated discussion on how these can assist in symptom management, recovery, and well-being.    Overall Yoseph was cooperative, attentive, and engaged throughout the session.  Observed symptoms include: no unusual symptoms outside of baseline.  As of today's appt insight to their mental illness appears good.  Progress toward goal completion seems good.    Safety:   Risk status (Self / Other harm or suicidal ideation)   denies current fears or concerns for personal safety.   denies current or recent suicidal ideation or behaviors. - CSSRS completed below.    denies current or recent homicidal ideation or behaviors.   denies current or recent self injurious behavior or ideation.   denies other safety concerns.   reports there has been no change in risk factors since their last session.     reports there has been no change in protective factors since their  last session.     Recommended that patient call 911 or go to the local ED should there be a change in any of these risk factors    [place] Protocol Risk Identification:  1) Have you wished you were dead or wished you could go to sleep and not wake up? No  2) Have you actually had any thoughts about killing yourself? No  If YES to 2, answer questions 3, 4, 5, 6  If NO to 2, go directly to question 6  3) Have you thought about how you might do this? N/A  4) Have you had any intension of acting on these thoughts of killing yourself, as opposed to you have the thoughts but you definitely would not act on them? N/A  5) Have you started to work out or worked out the details of how to kill yourself? Do you intend to carry out this plan? N/A  Always Ask Question 6  6) Have you done anything, started to do anything, or prepared to do anything to end your life? No  Examples: collected pills, obtained a gun, gave away valuables, wrote a will or suicide note, held a gun but changed your mind, cut yourself, tried to hang yourself, etc.  RISK INDICATOR & ACTION BY CLINICIAN: Patient denied any current/recent/lifetime history of suicidal ideation and/or behaviors.  No safety plan indicated at this time.     Discussed overall safety plan should symptoms feel unmanageable or safety concerns become imminent. Yoseph was able to contract for safety.   Crisis Numbers: After hours:  [phone]   Call or Text 988 or [facility] [phone]    Mental Status Exam:  Appearance: Appropriate   Eye Contact: Good   Psychomotor Behavior: Normal   Attitude: Cooperative   Orientation: All  Speech Rate: Normal   Volume: Normal   Mood: Normal  Affect: Appropriate   Thought Content: Clear   Thought Form: Coherent  Logical   Insight: Good     Plan/Referrals:   Yoseph is participating in coordinated speciality care via the First Episode of Bipolar - STRIDE Program within our clinic.  I will meet with Yoseph weekly  for Increasing  Resiliency in Life aka IRL (individual therapy), therapy, and support aimed at maximizing Yoseph's opportunity for recovery from extreme symptoms states of psychosis, joanna, and/or depression.      Home practice was identified as N/A   Next topic to consider: Check in  Next session: 2 weeks    PROVIDER: Mar Yang      Patient reviewed in supervision with OMID Giraldo who will sign the note.       Additional screening measures (Complete every 90 days)       PHQ, TAURUS, PROMIS, MIRECC GAF, and IMR Scales (reviewed every 90 days)  PHQ9:       1/8/2025    12:18 PM 1/16/2025    10:12 AM 2/28/2025    12:12 PM   PHQ-9 SCORE   PHQ-9 Total Score MyChart 3 (Minimal depression) 5 (Mild depression) 4 (Minimal depression)   PHQ-9 Total Score 3  5  4        Patient-reported     GAD7:        No data to display              PROMIS 10-Global Health (only subscores and total score):       1/8/2025    12:30 PM 1/16/2025    10:14 AM   PROMIS-10 Scores Only   Global Mental Health Score 13  14    Global Physical Health Score 14  16    PROMIS TOTAL - SUBSCORES 27  30        Patient-reported

## 2025-03-21 DIAGNOSIS — R63.5 WEIGHT GAIN: ICD-10-CM

## 2025-03-21 NOTE — PROGRESS NOTES
[facility]  Psychiatry Clinic  Psychological Testing by [employer]     Yoseph Grayson MRN# [id number]   Age: 19 year old YOB: 2005     Date:  2/28/25    Video- Visit Details   Type of service:  video visit  Video start time:  1:30 PM  Video end time:  2:30 PM  Originating location (patient location):  [facility]   Location- Home  Distant Site (provider location): HIPAA compliant location Off-site  Platform used for video visit:  Secure real time interactive audio and visual telecommunication system via [employer]     Attendees: Patient attended the session alone  : No, English    Identifying Information/Reason for Referral  Yoseph Grayson is a 19 year old male who prefers the name Yoseph & pronouns he.  Yoseph has a history of bipolar disorder.  The patient was seen for psychological testing. The purpose of the current psychological evaluation was to provide information about Yoseph's social, emotional and cognitive functioning, to assist with diagnostic clarification and to guide his treatment and recovery planning. Results of the following assessments are to be used in conjunction with the standard diagnostic evaluation.    A total of 60 minutes were spent in test administration and scoring by this writer, willy PALM.  See Testing Evaluation Report on future date for a full interpretation of the findings and data.     Summary of Services/Procedures  Yoseph was administered a psychological test battery tailored to his age and the referral questions.     Tests Administered  COMPASS 10   Quality of Life Scale- Abbreviated        Behavioral Observations  (NOTE: Additional behavioral observations and summary statement regarding validity of testing completed. Delete out grey italics before closing note).  Overall, Yoseph demonstrated good effort throughout testing. Therefore, the current evaluation results are thought to  provide a accurate representation of his functioning in the areas assessed.     Plan  (NOTE: Delete out grey italics before closing note. If more testing is scheduled, use this:)  Testing is  complete.     [If completed] The patient and their invited support system will participate in a feedback appointment on a future date with their clinician.     Will coordinate care with other treatment providers to assist with planning as needed.      DEE MILLER  Psychometrist      Billing for this encounter (CPT 10913, 19454) will occur on a later date when a qualified health professional reviews the findings with the patient in a psychological evaluation appointment (CPT 06815, 52520).

## 2025-03-24 NOTE — TELEPHONE ENCOUNTER
"Date of Last Office Visit: 3/6/25  RTC: 4 weeks  No shows: 0  Cancellations: 0  Date of Next Office Visit: 4/3/25  ------------------------------    Last Script Details::    metFORMIN (GLUCOPHAGE) 500 MG tablet 30 tablet 1 1/23/2025     ------------------------------  From last visit note:   \"Increase metformin 500 mg daily BID \"        Refill Decision:    []Medication refilled per  Medication Refill in Ambulatory Care  policy.           Request from pharmacy:  Requested Prescriptions   Pending Prescriptions Disp Refills    metFORMIN (GLUCOPHAGE) 500 MG tablet [Pharmacy Med Name: METFORMIN  MG TABLET] 30 tablet 1     Sig: TAKE 1 TABLET BY MOUTH DAILY WITH BREAKFAST       Biguanide Agents Failed - 3/24/2025 10:04 AM        Failed - Medication indicated for associated diagnosis     Medication is associated with one or more of the following diagnoses:     Gestational diabetes mellitus     Hyperinsulinar obesity     Hypersecretion of ovarian androgens    Non-alcoholic fatty liver    Polycystic ovarian syndrome               Pre-diabetes (DM 2 prevention)    Type 2 diabetes mellitus     Weight gain, antipsychotic therapy-induced    Impaired fasting glucose          Passed - Patient is age 10 or older        Passed - Patient does NOT have a diagnosis of CHF.        Passed - Medication is active on med list and the sig matches. RN to manually verify dose and sig if red X/fail.     If the protocol passes (green check), you do not need to verify med dose and sig.    A prescription matches if they are the same clinical intention.    For Example: once daily and every morning are the same.    The protocol can not identify upper and lower case letters as matching and will fail.     For Example: Take 1 tablet (50 mg) by mouth daily     TAKE 1 TABLET (50 MG) BY MOUTH DAILY    For all fails (red x), verify dose and sig.    If the refill does match what is on file, the RN can still proceed to approve the refill request.     "   If they do not match, route to the appropriate provider.             Passed - Has GFR on file in past 12 months and most recent value is normal        Passed - Recent (6 mo) or future (90 days) visit within the authorizing provider's specialty     The patient must have completed an in-person or virtual visit within the past 6 months or has a future visit scheduled within the next 90 days with the authorizing provider s specialty.  Urgent care and e-visits do not quality as an office visit for this protocol.

## 2025-03-31 ENCOUNTER — OFFICE VISIT (OUTPATIENT)
Dept: PSYCHIATRY | Facility: CLINIC | Age: 20
End: 2025-03-31
Attending: SOCIAL WORKER
Payer: COMMERCIAL

## 2025-03-31 DIAGNOSIS — F31.10 BIPOLAR I DISORDER, MOST RECENT EPISODE (OR CURRENT) MANIC (H): Primary | ICD-10-CM

## 2025-03-31 ASSESSMENT — PATIENT HEALTH QUESTIONNAIRE - PHQ9
10. IF YOU CHECKED OFF ANY PROBLEMS, HOW DIFFICULT HAVE THESE PROBLEMS MADE IT FOR YOU TO DO YOUR WORK, TAKE CARE OF THINGS AT HOME, OR GET ALONG WITH OTHER PEOPLE: SOMEWHAT DIFFICULT
SUM OF ALL RESPONSES TO PHQ QUESTIONS 1-9: 3
SUM OF ALL RESPONSES TO PHQ QUESTIONS 1-9: 3

## 2025-04-01 NOTE — PROGRESS NOTES
[facility]  Psychiatry Clinic  First Episode of Bipolar - STRIDE Program  Increasing Resiliency in Life aka IRL (individual therapy)  Clinician Contact & Psychotherapy Progress Note      Patient: Yoseph Grayson (2005), MRN: [id number]  Date: 3/31/25  Diagnosis:   1. Bipolar I disorder, most recent episode (or current) manic (H)       Clinician: Mar Yang  Attendees: Patient      Service Type: Individual  Extended Session (60+ minutes): No  Interactive Complexity: No  Crisis: No    Length of Actual Contact: Start Time: 11:55; End Time: 12:41  In- Person at the [facility]: Yes      Data:  Progress on treatment objective & homework: Satisfactory progress - ACTION (Actively working towards change); Intervened by reinforcing change plan / affirming steps taken      Medication utilization: No changes to current psychiatric medication(s)   Notable medication updates/questions/concerns since our last visit include none.  Writer will notify assigned prescriber, RN team, and Pharmacist for follow-up.     Current / Ongoing Stressors and Urgent Concerns: Ongoing stressor remains Yoseph's return to school this fall and creating new relationships     Intervention:  Motivational Interviewing- Expressed Empathy/Understanding, Supported Autonomy, Collaboration, Evocation, Permission to raise concern or advise, Open-ended questions, Reflections: simple and complex, Change talk (evoked), and Reframe / Response to change talk- E - Evoked more info from patient about behavior change, A - Affirmed patient's thoughts, decisions, or attempts at behavior change, R - Reflected patient's change talk, and S - Summarized patient's change talk statements  Educational Teaching Strategies- Review of written material/education  Relate information to client's experience  Ask questions to check comprehension  CBT Teaching Strategies- Coping skills training (review current coping skills,  "increase currently used skills, model new skill, role play new skill, feedback, and plan home practice), Pleasant Activity Scheduling (scheduling activities in the near future that you can look forward to, introduced more positivity around engagement in future activities, and reduce negative thinking about future activities), Recognizing the positive (visualize and discuss the best parts of the day to promote positive thinking patterns) , Play the Script Until the End (imagine the outcome of the worst case scenario, let the scenario play out, and recognize that even if everything feared happens, it will likely turn out okay), Reinforcement and shaping (positive feedback for steps towards goals, gains in knowledge & skills, and follow-through on home assignments), Reframe Cognitive Distortions (become aware of which distortions you are most vulnerable to and identifying and challenging our harmful automatic thoughts), and Behavioral Experiments (engage in a \"what if\" consideration, test existing beliefs, test more adaptive beliefs, and modify unhelpful beliefs)     Assessment/Progress Note:   Yoseph attended today's session. He reports no SI or safety concerns.  Yoseph returned from a family vacation in the [place] on 3[place]29/25.  Yoseph requested we discuss some of the feelings he had while at the resort with his family regarding social anxiety and body image. He reported that while there he felt uncomfortable around others at the resort, especially peers. He stated he also felt like he was \"wearing a mask\", especially with his family, as he felt bad for not having a good time at a vacation his parents had paid for. He spoke to his parents at the time about his social anxiety and he reported his father had concerns that he was \"spinning\" and that he needed to speak to his therapist about it, but that his brother, Hudson thought his father was overreacting.  I asked him to reflect on how this made him feel " in terms of recovery and we processed some of his feelings during the session.  Yoseph will be going to [place] this weekend to see friends and meet some of his new roommates that he will live with this fall.  I asked him to monitor his feelings while he is in a comfort zone and with friends versus a foreign country with family. I wonder if Yoseph's parents would benefit from family therapy in order to help them process their fears around Yoseph's episode and diagnosis. As my last session with Yoseph will be 4/5/25, we also decided that he will transfer his therapy to [facility]. Yoseph agreed with this plan and I will discuss next steps with Hanh and my supervisor, Clarice.    Focus for today's session: Module 9 - Dealing with Negative Feelings    Assessed symptom presence and potential triggers for the patient.  Yoseph reports that symptoms include anxiety, which is stable. Our continued goal is promoting return to functioning in emotional regulation, thought process and social relationships, attaining control over disturbing thoughts, interact appropriately in social situations, improve coping skills to deal with stress and interpersonal relationships, provide supportive therapy, psychoeducation, and understanding stressors that trigger psychotic episodes.      Today we reviewed Module 9 - Dealing with Negative Feelings. Yoseph continues to struggle with body image issues related to medication induced weight gain,    Writer used a relational and interpersonal approach to explore feelings, motivations, and behavior. Writer offered support, feedback, validation, and reinforced use of skills taught in IRT from modalities including cognitive behavioral therapy, psycho education, and skills training. Promoted understanding of their experiences of psychosis and how it impacts them in important areas of their life and in recovery goals. Reflected on client's strengths and resiliency factors  and facilitated discussion on how these can assist in symptom management, recovery, and well-being.    Overall Yoseph was cooperative, attentive, and engaged throughout the session.  Observed symptoms include: no unusual symptoms outside of baseline.  As of today's appt insight to their mental illness appears good.  Progress toward goal completion seems good.    Safety:   Risk status (Self / Other harm or suicidal ideation)   denies current fears or concerns for personal safety.   denies current or recent suicidal ideation or behaviors. - CSSRS completed below.    denies current or recent homicidal ideation or behaviors.   denies current or recent self injurious behavior or ideation.   denies other safety concerns.   reports there has been no change in risk factors since their last session.     reports there has been no change in protective factors since their last session.     Recommended that patient call 911 or go to the local ED should there be a change in any of these risk factors    [facility]man Protocol Risk Identification:  1) Have you wished you were dead or wished you could go to sleep and not wake up? No  2) Have you actually had any thoughts about killing yourself? No  If YES to 2, answer questions 3, 4, 5, 6  If NO to 2, go directly to question 6  3) Have you thought about how you might do this? N/A  4) Have you had any intension of acting on these thoughts of killing yourself, as opposed to you have the thoughts but you definitely would not act on them? N/A  5) Have you started to work out or worked out the details of how to kill yourself? Do you intend to carry out this plan? N/A  Always Ask Question 6  6) Have you done anything, started to do anything, or prepared to do anything to end your life? No  Examples: collected pills, obtained a gun, gave away valuables, wrote a will or suicide note, held a gun but changed your mind, cut yourself, tried to hang yourself, etc.  RISK INDICATOR & ACTION BY  CLINICIAN: Patient denied any current/recent/lifetime history of suicidal ideation and/or behaviors.  No safety plan indicated at this time.       Discussed overall safety plan should symptoms feel unmanageable or safety concerns become imminent. Yoseph was able to contract for safety.   Crisis Numbers: After hours:  [phone]   Call or Text 000 or [facility] [phone]    Mental Status Exam:  Appearance: Appropriate   Eye Contact: Good   Psychomotor Behavior: Normal   Attitude: Cooperative   Orientation: All  Speech Rate: Normal   Volume: Normal   Mood: Normal  Affect: Appropriate   Thought Content: Clear   Thought Form: Coherent  Logical   Insight: Good     Plan/Referrals:   Yoseph is participating in coordinated speciality care via the First Episode of Bipolar - STRIDE Program within our clinic.  I will meet with Yoseph weekly  for Increasing Resiliency in Life aka IRL (individual therapy), therapy, and support aimed at maximizing Yoseph's opportunity for recovery from extreme symptoms states of psychosis, joanna, and/or depression.      Home practice was identified as Be mindful of your physical sensations and reactions when interacting with new people this weekend in [place]  Next topic to consider: Check In  Next session: 1 week    PROVIDER: Mar Yang      Patient reviewed in supervision with [facility] who will sign the note.       Additional screening measures (Complete every 90 days)     Answers submitted by the patient for this visit:  Patient Health Questionnaire (Submitted on 3/31/2025)  If you checked off any problems, how difficult have these problems made it for you to do your work, take care of things at home, or get along with other people?: Somewhat difficult  PHQ9 TOTAL SCORE: 3    PHQ, TAURUS, PROMIS, MIRECC GAF, and IMR Scales (reviewed every 90 days)  PHQ9:       1/8/2025    12:18 PM 1/16/2025    10:12 AM 2/28/2025    12:12 PM 3/31/2025    11:49 AM   PHQ-9 SCORE    PHQ-9 Total Score MyChart 3 (Minimal depression) 5 (Mild depression) 4 (Minimal depression) 3 (Minimal depression)   PHQ-9 Total Score 3  5  4  3        Patient-reported     GAD7:        No data to display              PROMIS 10-Global Health (only subscores and total score):       1/8/2025    12:30 PM 1/16/2025    10:14 AM   PROMIS-10 Scores Only   Global Mental Health Score 13  14    Global Physical Health Score 14  16    PROMIS TOTAL - SUBSCORES 27  30        Patient-reported

## 2025-04-03 ENCOUNTER — OFFICE VISIT (OUTPATIENT)
Dept: PSYCHIATRY | Facility: CLINIC | Age: 20
End: 2025-04-03
Attending: NURSE PRACTITIONER
Payer: COMMERCIAL

## 2025-04-03 VITALS
HEART RATE: 74 BPM | SYSTOLIC BLOOD PRESSURE: 130 MMHG | BODY MASS INDEX: 28.81 KG/M2 | WEIGHT: 206.6 LBS | DIASTOLIC BLOOD PRESSURE: 75 MMHG

## 2025-04-03 DIAGNOSIS — R63.5 WEIGHT GAIN: ICD-10-CM

## 2025-04-03 DIAGNOSIS — F31.9 BIPOLAR 1 DISORDER (H): ICD-10-CM

## 2025-04-03 PROCEDURE — 99214 OFFICE O/P EST MOD 30 MIN: CPT | Performed by: NURSE PRACTITIONER

## 2025-04-03 RX ORDER — LITHIUM CARBONATE 150 MG/1
150 CAPSULE ORAL AT BEDTIME
Qty: 30 CAPSULE | Refills: 0 | Status: SHIPPED | OUTPATIENT
Start: 2025-04-03

## 2025-04-03 RX ORDER — LITHIUM CARBONATE 300 MG/1
900 CAPSULE ORAL AT BEDTIME
Qty: 90 CAPSULE | Refills: 2 | Status: SHIPPED | OUTPATIENT
Start: 2025-04-03

## 2025-04-03 RX ORDER — OLANZAPINE 5 MG/1
5 TABLET ORAL
Qty: 30 TABLET | Refills: 1 | Status: SHIPPED | OUTPATIENT
Start: 2025-04-03

## 2025-04-03 ASSESSMENT — PATIENT HEALTH QUESTIONNAIRE - PHQ9
SUM OF ALL RESPONSES TO PHQ QUESTIONS 1-9: 4
SUM OF ALL RESPONSES TO PHQ QUESTIONS 1-9: 4
10. IF YOU CHECKED OFF ANY PROBLEMS, HOW DIFFICULT HAVE THESE PROBLEMS MADE IT FOR YOU TO DO YOUR WORK, TAKE CARE OF THINGS AT HOME, OR GET ALONG WITH OTHER PEOPLE: SOMEWHAT DIFFICULT

## 2025-04-03 ASSESSMENT — PAIN SCALES - GENERAL: PAINLEVEL_OUTOF10: NO PAIN (0)

## 2025-04-03 NOTE — PROGRESS NOTES
[facility]  Psychiatry Clinic  PSYCHIATRY CLINIC FOLLOW UP VISIT     CARE TEAM:  PCP- Specialists, [facility] Pediatric   Yoseph is a 19 year old who prefers the name Yoseph and uses pronouns he, they.     DIAGNOSES, ASSESSMENT & PLAN                                                                                          Diagnostic Impressions (provisional)  Bipolar 1 Disorder with psychosis, mre depressed    Yoseph is a 19 year old male who reports a psychiatric history significant for multiple episodes of depressions with onset at approx age 12-13, and a first episode of joanna in May 2024 with inpatient admission at [facility].  There is a family history of depression and suicide.  Manic episode occurred in the context of recently starting zoloft + intermittent cannabis use.  He was stabilized on lithium and olanzapine.  Following resolution of joanna he experienced an episode of depression which improved with Wellbutrin.    -Today, he reports mood is stable overall and he tolerated recent dose reduction of olanzapine without concern for mood symptoms.  He has gained approx 40 lbs since starting olanzapine and would like to discontinue it today.  Lithium is now WNL with recent dose increase.  He will keep olanzapine PRN on hand for any s/s of joanna, and will reach out to the clinic or seek emergency services should these occur, reviewed early warning signs today. He denies SI/intent or plan and risk of harm to self or others is low.     Bipolar disorder  Continue Bupropion  mg daily   Continue Lithium 1050 mg at bedtime  Discontinue Olanzapine 2.5 mg at bedtime, keep olanzapine 5 mg daily PRN on hand for a back-up medication    SGA induced weight gain  Increase metformin to 500 mg daily BID    Lithium, SGA monitoring  9/25/24: TSH/T4, lipid panel  1/23/25: BMP  2/3/25: Lithium level     RTC: 2-4 weeks or sooner if needed   CRISIS Numbers:   Provided  routinely in AVS        CHIEF CONCERN                                Bipolar disorder, medication SE    Interim History                                                   -Yoseph is a 19 year old who recently enrolled in the [facility] early bipolar disorder program.  DA with me was on 1/16.  He was diagnosed with bipolar disorder during an inpatient admission from [facility], where he was admitted from 5/2 - 5/9/24.    -Last seen by me on 3/6 at which time olanzapine was reduced to 2.5 mg daily and metformin was increased to 500 mg BID.  He reports that he did increase metformin but then ran out, not sure if the higher dose was helpful.   -Mood has been stable with dose reduction of olanzapine. He is sleeping more than he would like, 10-12 hrs per night. It takes him a little longer to fall asleep at night and is tired in the morning.  Takes approx with 45 min to fall asleep.  Energy level has been normal.  Anxiety and rumination have been minimal.  Has been having less anxiety in social situations. Denies any s/s of joanna or psychosis. Denies death ideation or SI.  -Has been processing anniversary of manic episode and what he has experienced over the last year.   -Is looking forward to returning to [employer] in the fall. Will be visiting friends in [place] this weekend.  Currently working 18 hrs per week.   -No substance since last visit   -Has recently started going to the gym more.     Medication SE:  Weight gain of 40 lbs since manic episode.  Baseline weight is 170 lbs, currently 206 lbs.  Appetite is increased but improved with metformin + dose reduction of olanzapine, has noticed sensation of being full has returned.  Sedation as above. Denies N/V/D or constipation.  Denies increase in thirst +  urination frequency.  Drinks 96 oz daily. Denies tremor, cognitive SE, sexual SE.      /75   Pulse 74   Wt 93.7 kg (206 lb 9.6 oz)   BMI 28.81 kg/m      Current psychiatric medications  Bupropion   mg daily   Lithium 1050 mg at bedtime  Olanzapine 2.5 mg at bedtime  Metformin 500 mg daily BID (has been taking this once daily)    Recent Substance Use:    Alcohol - 1x per month, 4 drinks per occasion  Cannabis - 1x per month, 10 mg of delta-9, none since last visit    SOCIAL and FAMILY HISTORY   (no change today)                                              per pt report         As documented in 12/3 note by Hanh Wilson, reviewed and updated as needed today.   Family Hx:  Mother eating disorder, anxiety and depression  reports history of completed suicides. Uncle completed suicide.    Social Hx:    Living situation: Yoseph currently lives in [place] with parents. Yoseph reports home life is going well.  Housing Type: house with parents  Guns, weapons, or other means to harm oneself in the home?  Unknown  Relationships: Significant relationships include Parents and friend Brigido.        Education: Yoseph's highest level of education is high school graduate. Was going to school for Music Education but switched to Elementary Education. Educational goals include finishing elementary education degree and returning to [employer]  Occupation: .  Occupational goals include to get an additional job and to eventually go back to school for elementary education.  Spiritual considerations: Patient does not identify with [place] community.  Is spiritual but is not as into it as he used to be.   Cultural influences: Yoseph describes their race as white and [place]. Yoseph's primary spoken language is English. Yoseph identifies their sexual orientation as questioning, and their gender as male. Yoseph prefers he/they pronouns.   Current Stressors: Living at home with parents, feelings of loneliness and depression.   Strengths & Opportunities:  Hobbies and enjoyable activities include Reading and Listening to Music. Spending time outside and playing video games. Likes to read comic books. Self identified  strengths are good with people and social. Exercise and nutrition habits include worries about gaining weight from the Wellbutrin.  Coping mechanisms include Meditation, Therapy, and Family.   Legal Hx: Yes: got caught with an edbile and had to do a class online    Trauma and/or Abuse Hx: There are no indications or report of: significant losses, trauma, abuse or neglect. Issues of possible neglect are not present. Identifies the hospital as a traumatic experience.   Hx: No    PAST PSYCH and SUBSTANCE USE HISTORY   (no change today)                   -Remembers first feeling depressed in the 7th grade.  Recalls approx 3 episodes of depression per year, lasting approx 1 month.   Felt he had some depression at baseline and then outside factors would make it worse.  Describes these episodes as low mood, anhedonia, negative thinking about himself (feeling he was a bad person, something wrong with him), low energy, hypersomnia (12 hrs per day).  He has experienced SI in the past with depression episodes.  Denies ever experiencing suicide intent or plan.  Was started on prozac in 2020, which helped prevent more significant depressions, but did not help with baseline depression and also made him feel numb.  He took prozac until some time in 2023, does not recall the dose.  Was prescribed by his pediatrician.  After coming off the prozac his mood got worse.  He had two more episodes of depressions in the context of interpersonal stressors (difficult roommate situation and a break up).  He sought care at [facility] and was started on Zoloft in April 2023.  Highest dose was 25-50 mg.  During this period he was using cannabis (delta-8 gummies) once every few weeks, and alcohol once every month. Highest use of cannabis was in the summer of 2023, daily use, but when he started the semester again he reduced use.  Denies any impact on mood or psychosis symptoms with cannabis use.   -He reports within 3-4 wks  of starting zoloft in April 2023, he began to experience an increase in energy and a decrease in sleep (3 hrs or less per night, sometimes not sleeping at all).  Mood was elevated.  One night he took a cannabis edible, and the next morning he tarted to experince symptoms of psychosis, including AH (characterized by positive conversations with people he knows), unusual beliefs that he thought he had superpowers (could live forever, could fly), ability to telepathically communicate,  concerned the government would find him and want to do research on his new abilities.  Had been texting his ex girlfriend strange content, including that he had killed  someone, and she called the police.  He was  taken to the ED and then admitted to to inpatient psychiatry.  Symptoms began to stabilize with olanzapine, ativan, and lithium.  Was held on a 72 hr hold for some of his stay    Psychiatric Hospitalizations: One hospitalization in May 2024 at [facility] for first episode of joanna, 5/2 - 5/9/24  Commitment: No, Current Castillo order: No  History of Electroconvulsive Therapy (ECT) or Transcranial Magnetic Stimulation (TMS): No     Self-Injurious Behavior: Denies  Suicidal Ideation Hx: Yes - has history of ideation but has called suicide hotline  Suicide Attempt- #-:No  Violence/Aggression Hx: No    Past medication trials:  Prozac  Sertraline 25-50 mg, precipitated joanna  Ativan while admitted    Recent medication changes:  2/6/25 decrease olanzapine from 10 mg to 5 mg daily      Outpatient Programs & Services [Psychotherapy, DBT, Day Treatment, Eating Disorder Tx, etc]:   Current:  Currently has an outpatient therapist. Cm Don as psychiatrist at [facility]     Past:  Has had previous therapists.Had previous hospitalization for manic episode.     Substance Use:  Past Use - Alcohol-  intermittent   and Cannabis-  monthly use since college, summer 2023 highest use at most days    No hx of substance use treatment     MEDICAL  HISTORY     Patient Active Problem List   Diagnosis    Bipolar affective disorder, currently manic, moderate (H)    Overweight (BMI 25.0-29.9)     Concussion in 2016, swimming     ALLERGIES: Patient has no known allergies.     MEDICAL REVIEW OF SYSTEMS                                                                  A comprehensive review of systems was performed and is negative other than noted in the HPI.    CURRENT MEDS       Current Outpatient Medications   Medication Sig Dispense Refill    buPROPion (WELLBUTRIN XL) 150 MG 24 hr tablet Take 1 tablet (150 mg) by mouth every morning. 30 tablet 2    lithium (ESKALITH) 150 MG capsule Take 1 capsule (150 mg) by mouth at bedtime. Take with three 300 mg tablets for a total bedtime dose of 1050 mg. 30 capsule 0    lithium 300 MG capsule Take 3 capsules (900 mg) by mouth at bedtime. 90 capsule 2    metFORMIN (GLUCOPHAGE) 500 MG tablet Take 1 tablet (500 mg) by mouth daily (with breakfast). 30 tablet 0    OLANZapine (ZYPREXA) 2.5 MG tablet Take 1 tablet (2.5 mg) by mouth at bedtime. 30 tablet 2    OLANZapine (ZYPREXA) 5 MG tablet Take 1.5 tablets (7.5 mg) by mouth at bedtime. (Patient not taking: Reported on 4/3/2025) 45 tablet 2       VITALS                                                                                              /75   Pulse 74   Wt 93.7 kg (206 lb 9.6 oz)   BMI 28.81 kg/m      MENTAL STATUS EXAM                                                             Alertness: alert  and oriented  Appearance: casually groomed  Behavior/Demeanor: cooperative and pleasant, with good  eye contact   Speech: normal  Language: intact  Psychomotor: normal or unremarkable  Mood: description consistent with euthymia  Affect:  euthymic ; congruent to: mood- yes, content- yes  Thought Process/Associations: unremarkable  Thought Content:  Reports none;  Denies suicidal & violent ideation and delusions  Perception:  Reports none;  Denies hallucinations  Insight:  adequate  Judgment: adequate for safety  Cognition: attention span: intact  concentration: intact  recent memory: intact  remote memory: intact  fund of knowledge: appropriate  Gait and Station: unremarkable    LABS and DATA         2/28/2025    12:12 PM 3/31/2025    11:49 AM 4/3/2025     2:15 PM   PHQ   PHQ-9 Total Score 4  3  4    Q9: Thoughts of better off dead/self-harm past 2 weeks Not at all Not at all Not at all       Patient-reported       Recent Labs   Lab Test 01/23/25  1054   CR 1.01   GFRESTIMATED >90     No lab results found.      RISK STATEMENT for SAFETY   Yoseph Hameed did not appear to be an imminent safety risk to self or others.  Denies current SI, intent or plan.     TREATMENT RISK STATEMENT:  The risks, benefits, alternatives and potential adverse effects have been discussed and are understood by the pt. The pt understands the risks of using street drugs or alcohol. There are no medical contraindications, the pt agrees to treatment with the ability to do so. The pt knows to call the clinic for any problems or to access emergency care if needed.  Medical and substance use concerns are documented above.  Psychotropic drug interaction check was done, including changes made today.    PROVIDER:  JACK Casiano CNP      Psychiatry Individual Psychotherapy Note   Psychotherapy start time -2:40   Psychotherapy end time -3:00  Date treatment plan last reviewed with patient - 4/3/2025      Subjective: This supportive psychotherapy session addressed issues related to goals of therapy and current psychosocial stressors. Patient's reaction: Action in the context of mental status appropriate for ambulatory setting.    Interactive complexity indicated? No  Plan: RTC in timeframe noted above  Psychotherapy services during this visit included myself and the patient.   Treatment Plan      SYMPTOMS; PROBLEMS   MEASURABLE GOALS;    FUNCTIONAL IMPROVEMENT / GAINS INTERVENTIONS DISCHARGE CRITERIA    Bipolar Disorder   Prevent joanna, depression  Learn skills for managing mood symptoms long term Supportive / psychodynamic marked symptom improvement      Answers submitted by the patient for this visit:  Patient Health Questionnaire (Submitted on 4/3/2025)  If you checked off any problems, how difficult have these problems made it for you to do your work, take care of things at home, or get along with other people?: Somewhat difficult  PHQ9 TOTAL SCORE: 4        Medical decision making     Level of Medical Decision Making:   - At least 1 chronic problem that is not stable  - Engaged in prescription drug management during visit (discussed any medication benefits, side effects, alternatives, etc.)          The longitudinal plan of care for the diagnosis(es)/condition(s) as documented were addressed during this visit. Due to the added complexity in care, I will continue to support Yoseph in the subsequent management and with ongoing continuity of care.

## 2025-04-07 ENCOUNTER — OFFICE VISIT (OUTPATIENT)
Dept: PSYCHIATRY | Facility: CLINIC | Age: 20
End: 2025-04-07
Attending: SOCIAL WORKER
Payer: COMMERCIAL

## 2025-04-07 DIAGNOSIS — F31.10 BIPOLAR I DISORDER, MOST RECENT EPISODE (OR CURRENT) MANIC (H): Primary | ICD-10-CM

## 2025-04-07 ASSESSMENT — PATIENT HEALTH QUESTIONNAIRE - PHQ9
SUM OF ALL RESPONSES TO PHQ QUESTIONS 1-9: 1
10. IF YOU CHECKED OFF ANY PROBLEMS, HOW DIFFICULT HAVE THESE PROBLEMS MADE IT FOR YOU TO DO YOUR WORK, TAKE CARE OF THINGS AT HOME, OR GET ALONG WITH OTHER PEOPLE: NOT DIFFICULT AT ALL
SUM OF ALL RESPONSES TO PHQ QUESTIONS 1-9: 1

## 2025-04-07 NOTE — PROGRESS NOTES
[facility]  Psychiatry Clinic  First Episode of Bipolar - STRIDE Program  Increasing Resiliency in Life aka IRL (individual therapy)  Clinician Contact & Psychotherapy Progress Note      Patient: Yoseph Grayson (2005), MRN: [id number]  Date: 4/07/25  Diagnosis:   1. Bipolar I disorder, most recent episode (or current) manic (H)       Clinician: Mar Yang  Attendees: Patient      Service Type: Individual  Extended Session (60+ minutes): No  Interactive Complexity: No  Crisis: No    Length of Actual Contact: Start Time: 11:55; End Time: 12:46  In- Person at the [facility]: Yes      Data:  Progress on treatment objective & homework: Stable - ACTION (Actively working towards change); Intervened by reinforcing change plan / affirming steps taken      Medication utilization: No changes to current psychiatric medication(s)   Notable medication updates/questions/concerns since our last visit include none.  Writer will notify assigned prescriber, RN team, and Pharmacist for follow-up.     Current / Ongoing Stressors and Urgent Concerns:  Yoseph has decided to move up to [place] this summer instead of waiting for the fall semester.  He reports that his parents are concerned but he also wants to get settled in before the semester starts. We discussed what he would do to support himself if he moved up early and the pros and cons of doing so. I believe that if Yoseph allows it, his parents could benefit from family therapy in the Stride program.    Intervention:  Motivational Interviewing- Expressed Empathy/Understanding, Supported Autonomy, Collaboration, Evocation, Permission to raise concern or advise, Open-ended questions, Reflections: simple and complex, Change talk (evoked), and Reframe / Response to change talk- E - Evoked more info from patient about behavior change, A - Affirmed patient's thoughts, decisions, or attempts at behavior change, R - Reflected  "patient's change talk, and S - Summarized patient's change talk statements  Educational Teaching Strategies- Review of written material/education  Relate information to client's experience  Ask questions to check comprehension  Break down information into small chunks  CBT Teaching Strategies- Cognitive restructuring (identify thoughts related to negative feelings, examine the evidence, and change though or form action plan), Pleasant Activity Scheduling (scheduling activities in the near future that you can look forward to, introduced more positivity around engagement in future activities, and reduce negative thinking about future activities), Relapse prevention planning (review of stressors, early warning signs, and written plan to respond to signs), Recognizing the positive (visualize and discuss the best parts of the day to promote positive thinking patterns) , Play the Script Until the End (imagine the outcome of the worst case scenario, let the scenario play out, and recognize that even if everything feared happens, it will likely turn out okay), Relaxation training (model relaxation technique, practice technique, and feedback), Reinforcement and shaping (positive feedback for steps towards goals, gains in knowledge & skills, and follow-through on home assignments), Reframe Cognitive Distortions (become aware of which distortions you are most vulnerable to and identifying and challenging our harmful automatic thoughts), and Behavioral Experiments (engage in a \"what if\" consideration, test existing beliefs, test more adaptive beliefs, and modify unhelpful beliefs)     Assessment/Progress Note:   Yoseph attended today's session and denies any SI or safety concerns. He went to [place] last weekend and reports that his comfort level was so much better around strangers when he is with his friends versus when he was with his family in the [place].  He stated that he thinks that this is because he is " going through the same things that his friends are so they are better able to understand and help support each other right now.  We discussed phase of life changes that are made during this time and I reinforced the idea that what he is experiencing in terms of relationships with family versus relationships with friends is a normal aspect of young adulthood. He stated that he still has social anxiety but he is better able to recognize it now versus last fall, and with that knowledge is able to calm the anxiety a little. We discussed where he is at in his maturity versus where he was a year ago and he stated he feels like he grew and learned from his bipolar episode and while he would have preferred not to have gone through it, he wonders if he would have grown this much as a person and in his identity if it did not. Yoseph will be in [place] this weekend to visit friends and I have asked him to pay attention to how his friendships with the [place] friend group have grown or changed since his episode.    We also discussed rumination and how we can utilize vagus nerve techniques to stop rumination when it starts to happen. I have asked for and received permission to find Teak videos or articles that I will send to him via [employer]. We went through some techniques such as humming, singing, tapping, exercise, and yoga as ways that can help stop rumination and to help relieve anxiety. I also asked Yoseph to pay attention to the feeling he is trying to grasp when he starts ruminating.  He reports that the primary feeling he is searching for is peace of mind.  We reflected that growth cannot happen without a certain level of discomfort and that relationships are built on a foundation of learning about others instead of attributing qualities to them.     Focus for today's session: Module 9 - Dealing with Negative Feelings    Assessed symptom presence and potential triggers for the patient.  Yoseph reports that  symptoms include  social anxiety , which is stable. Our continued goal is promoting return to functioning in emotional regulation, thought process and social relationships, attaining control over disturbing thoughts, interact appropriately in social situations, improve coping skills to deal with stress and interpersonal relationships, provide supportive therapy, psychoeducation, understanding stressors that trigger psychotic episodes, and refocus disordered thinking.      Today we reviewed Module 9 - Dealing with Negative Feelings.      Writer used a relational and interpersonal approach to explore feelings, motivations, and behavior. Writer offered support, feedback, validation, and reinforced use of skills taught in IRT from modalities including cognitive behavioral therapy, psycho education, and skills training. Promoted understanding of their experiences of psychosis and how it impacts them in important areas of their life and in recovery goals. Reflected on client's strengths and resiliency factors and facilitated discussion on how these can assist in symptom management, recovery, and well-being.    Overall Yoseph was cooperative, attentive, and engaged throughout the session.  Observed symptoms include: no unusual symptoms outside of baseline.  As of today's appt insight to their mental illness appears excellent.  Progress toward goal completion seems excellent.    Safety:   Risk status (Self / Other harm or suicidal ideation)   denies current fears or concerns for personal safety.   denies current or recent suicidal ideation or behaviors. - CSSRS completed below.    denies current or recent homicidal ideation or behaviors.   denies current or recent self injurious behavior or ideation.   denies other safety concerns.   reports there has been no change in risk factors since their last session.     reports there has been no change in protective factors since their last session.     Recommended that patient  call 911 or go to the local ED should there be a change in any of these risk factors    [place] Protocol Risk Identification:  1) Have you wished you were dead or wished you could go to sleep and not wake up? No  2) Have you actually had any thoughts about killing yourself? No  If YES to 2, answer questions 3, 4, 5, 6  If NO to 2, go directly to question 6  3) Have you thought about how you might do this? N/A  4) Have you had any intension of acting on these thoughts of killing yourself, as opposed to you have the thoughts but you definitely would not act on them? N/A  5) Have you started to work out or worked out the details of how to kill yourself? Do you intend to carry out this plan? N/A  Always Ask Question 6  6) Have you done anything, started to do anything, or prepared to do anything to end your life? No  Examples: collected pills, obtained a gun, gave away valuables, wrote a will or suicide note, held a gun but changed your mind, cut yourself, tried to hang yourself, etc.  RISK INDICATOR & ACTION BY CLINICIAN: Patient denied any current/recent/lifetime history of suicidal ideation and/or behaviors.  No safety plan indicated at this time.       Discussed overall safety plan should symptoms feel unmanageable or safety concerns become imminent. Yoseph was able to contract for safety.   Crisis Numbers: After hours:  [phone]   Call or Text 988 or [facility] [phone]    Mental Status Exam:  Appearance: Appropriate   Eye Contact: Good   Psychomotor Behavior: Normal   Attitude: Cooperative   Orientation: All  Speech Rate: Normal   Volume: Normal   Mood: Normal  Affect: Appropriate   Thought Content: Clear   Thought Form: Coherent  Logical   Insight: Good     Plan/Referrals:   Yoseph is participating in coordinated speciality care via the First Episode of Bipolar - STRIDE Program within our clinic.  I will meet with Yoseph weekly  for Increasing Resiliency in Life aka IRL (individual therapy),  therapy, and support aimed at maximizing Yoseph's opportunity for recovery from extreme symptoms states of psychosis, joanna, and/or depression.      Home practice was identified as Look for changes in growth when with friends this weekend in [place] and reflect on how he has also changed  Next topic to consider: Check in  Next session: 2 weeks - I will be [place] next week and so our next session will be on 4/21/25    PROVIDER: Mar Yang      Patient reviewed in supervision with OMID Giraldo who will sign the note.       Additional screening measures (Complete every 90 days)     Answers submitted by the patient for this visit:  Patient Health Questionnaire (Submitted on 4/7/2025)  If you checked off any problems, how difficult have these problems made it for you to do your work, take care of things at home, or get along with other people?: Not difficult at all  PHQ9 TOTAL SCORE: 1      PHQ, TAURUS, PROMIS, MIRECC GAF, and IMR Scales (reviewed every 90 days)  PHQ9:       1/8/2025    12:18 PM 1/16/2025    10:12 AM 2/28/2025    12:12 PM 3/31/2025    11:49 AM 4/3/2025     2:15 PM 4/7/2025    11:48 AM   PHQ-9 SCORE   PHQ-9 Total Score MyChart 3 (Minimal depression) 5 (Mild depression) 4 (Minimal depression) 3 (Minimal depression) 4 (Minimal depression) 1 (Minimal depression)   PHQ-9 Total Score 3  5  4  3  4  1        Patient-reported     GAD7:        No data to display              PROMIS 10-Global Health (only subscores and total score):       1/8/2025    12:30 PM 1/16/2025    10:14 AM   PROMIS-10 Scores Only   Global Mental Health Score 13  14    Global Physical Health Score 14  16    PROMIS TOTAL - SUBSCORES 27  30        Patient-reported

## 2025-04-21 ENCOUNTER — OFFICE VISIT (OUTPATIENT)
Dept: PSYCHIATRY | Facility: CLINIC | Age: 20
End: 2025-04-21
Attending: SOCIAL WORKER
Payer: COMMERCIAL

## 2025-04-21 DIAGNOSIS — F31.10 BIPOLAR I DISORDER, MOST RECENT EPISODE (OR CURRENT) MANIC (H): Primary | ICD-10-CM

## 2025-04-21 NOTE — PROGRESS NOTES
[facility]  Psychiatry Clinic  First Episode of Bipolar - STRIDE Program  Increasing Resiliency in Life aka IRL (individual therapy)  Clinician Contact & Psychotherapy Progress Note      Patient: Yoseph Grayson (2005), MRN: [id number]  Date: 4/21/25  Diagnosis:   1. Bipolar I disorder, most recent episode (or current) manic (H)       Clinician: Mar Yang  Attendees: Patient      Service Type: Individual  Extended Session (60+ minutes): No  Interactive Complexity: No  Crisis: No    Length of Actual Contact: Start Time: 12:03; End Time: 12:50  In- Person at the [facility]: Yes      Data:  Progress on treatment objective & homework: Stable - ACTION (Actively working towards change); Intervened by reinforcing change plan / affirming steps taken      Medication utilization: No changes to current psychiatric medication(s)   Notable medication updates/questions/concerns since our last visit include none.  Writer will notify assigned prescriber, RN team, and Pharmacist for follow-up.     Current / Ongoing Stressors and Urgent Concerns: Yoseph and his parents are in conflict regarding Yoseph's move to [place] in June    Intervention:  Motivational Interviewing- Expressed Empathy/Understanding, Supported Autonomy, Collaboration, Evocation, Permission to raise concern or advise, Open-ended questions, Reflections: simple and complex, Change talk (evoked), and Reframe / Response to change talk- E - Evoked more info from patient about behavior change, A - Affirmed patient's thoughts, decisions, or attempts at behavior change, R - Reflected patient's change talk, and S - Summarized patient's change talk statements  Educational Teaching Strategies- Relate information to client's experience  Ask questions to check comprehension  Break down information into small chunks  Adopt client's language   CBT Teaching Strategies- Pleasant Activity Scheduling (scheduling activities  "in the near future that you can look forward to, introduced more positivity around engagement in future activities, and reduce negative thinking about future activities), Relapse prevention planning (review of stressors and early warning signs), Recognizing the positive (visualize and discuss the best parts of the day to promote positive thinking patterns) , Play the Script Until the End (imagine the outcome of the worst case scenario, let the scenario play out, and recognize that even if everything feared happens, it will likely turn out okay), Reinforcement and shaping (positive feedback for steps towards goals, gains in knowledge & skills, and follow-through on home assignments), Reframe Cognitive Distortions (become aware of which distortions you are most vulnerable to and identifying and challenging our harmful automatic thoughts), and Behavioral Experiments (engage in a \"what if\" consideration, test existing beliefs, test more adaptive beliefs, and modify unhelpful beliefs)     Assessment/Progress Note:   Yoseph attended today's session. He denied any SI or safety concerns. Yoseph arrived today frustrated with his parents. He reported that they are opposed to his early move to [place] and have expressed concerns regarding his financial situation, specifically not having a job yet.  We discussed ways in which Yoseph can mitigate his parents fears about his lack of a job, I.e. finding a job before moving to [place].  He said he feels lonely as he does not have any friends here in the [facility] and he is frustrated that his parents don't seem to understand how important his friends are to him right now. He stated emphatically, \"I just want to go home\". I reflected that I thought it was telling that he referred to [place] as his home and not his parent's house. He agreed that he does not feel at home with his parents.  We role played ways in which he could engage his parents in a productive conversation " "regarding their hesitancy to support an early move. He stated his dad never listen's but instead goes into a \"fix-it role\". We discussed ways in which he could express his frustration with his father's need to fix instead of listen. I reiterated that he cannot control outside factors but that he can control how he reacts to situations and that he can control how he communicates with his parents. We utilized role playing in today's session in order to help Yoseph have a real life dialogue with his parents. His homework for the week is to look for possible jobs in [place] that are available starting in mid-June. As I will be graduating, our last session together will be on 5/5/25 and he will transfer therapy to [facility].  Yoseph communicated understanding and agreed with this plan. Yoseph also asked for a referral for ADHD testing through [facility] and I have placed one in the system.    Focus for today's session:  Phase of Life conflict with parents    Assessed symptom presence and potential triggers for the patient.  Yoseph reports that symptoms include anxiety, which is stable. Our continued goal is promoting return to functioning in emotional regulation, thought process and social relationships, attaining control over disturbing thoughts, improve coping skills to deal with stress and interpersonal relationships, provide supportive therapy, psychoeducation, and understanding stressors that trigger psychotic episodes.      Phase of Life conflict with parents    Writer used a relational and interpersonal approach to explore feelings, motivations, and behavior. Writer offered support, feedback, validation, and reinforced use of skills taught in IRT from modalities including cognitive behavioral therapy, psycho education, and skills training. Promoted understanding of their experiences of psychosis and how it impacts them in important areas of their life and in recovery goals. Reflected on " client's strengths and resiliency factors and facilitated discussion on how these can assist in symptom management, recovery, and well-being.    Overall Yoseph was cooperative, attentive, and engaged throughout the session.  Observed symptoms include: no unusual symptoms outside of baseline.  As of today's appt insight to their mental illness appears good.  Progress toward goal completion seems good.    Safety:   Risk status (Self / Other harm or suicidal ideation)   denies current fears or concerns for personal safety.   denies current or recent suicidal ideation or behaviors. - CSSRS completed below.    denies current or recent homicidal ideation or behaviors.   denies current or recent self injurious behavior or ideation.   denies other safety concerns.   reports there has been no change in risk factors since their last session.     reports there has been no change in protective factors since their last session.     Recommended that patient call 911 or go to the local ED should there be a change in any of these risk factors    [place] Protocol Risk Identification:  1) Have you wished you were dead or wished you could go to sleep and not wake up? No  2) Have you actually had any thoughts about killing yourself? No  If YES to 2, answer questions 3, 4, 5, 6  If NO to 2, go directly to question 6  3) Have you thought about how you might do this? N/A  4) Have you had any intension of acting on these thoughts of killing yourself, as opposed to you have the thoughts but you definitely would not act on them? N/A  5) Have you started to work out or worked out the details of how to kill yourself? Do you intend to carry out this plan? N/A  Always Ask Question 6  6) Have you done anything, started to do anything, or prepared to do anything to end your life? No  Examples: collected pills, obtained a gun, gave away valuables, wrote a will or suicide note, held a gun but changed your mind, cut yourself, tried to hang  yourself, etc.  RISK INDICATOR & ACTION BY CLINICIAN: Patient denied any current/recent/lifetime history of suicidal ideation and/or behaviors.  No safety plan indicated at this time.       Discussed overall safety plan should symptoms feel unmanageable or safety concerns become imminent. Yoseph was able to contract for safety.   Crisis Numbers: After hours:  [phone]   Call or Text 8 or [facility] [phone]    Mental Status Exam:  Appearance: Appropriate   Eye Contact: Good   Psychomotor Behavior: Normal   Attitude: Cooperative   Orientation: All  Speech Rate: Normal   Volume: Normal   Mood: Normal  Affect: Appropriate   Thought Content: Clear   Thought Form: Coherent  Logical   Insight: Good     Plan/Referrals:   Yoseph is participating in coordinated speciality care via the First Episode of Bipolar - STRIDE Program within our clinic.  I will meet with Yoseph weekly  for Increasing Resiliency in Life aka CAMILOL (individual therapy), therapy, and support aimed at maximizing Yoseph's opportunity for recovery from extreme symptoms states of psychosis, joanna, and/or depression.      Home practice was identified as Speak to parents regarding move.   Next topic to consider: Check in  Next session: 1 week    PROVIDER: Mar Yang      Patient reviewed in supervision with OMID Giraldo who will sign the note.       Additional screening measures (Complete every 90 days)       PHQ, TAURUS, PROMIS, MIRECC GAF, and IMR Scales (reviewed every 90 days)  PHQ9:       1/8/2025    12:18 PM 1/16/2025    10:12 AM 2/28/2025    12:12 PM 3/31/2025    11:49 AM 4/3/2025     2:15 PM 4/7/2025    11:48 AM   PHQ-9 SCORE   PHQ-9 Total Score MyChart 3 (Minimal depression) 5 (Mild depression) 4 (Minimal depression) 3 (Minimal depression) 4 (Minimal depression) 1 (Minimal depression)   PHQ-9 Total Score 3  5  4  3  4  1        Patient-reported     GAD7:        No data to display              PROMIS 10-Global Health  (only subscores and total score):       1/8/2025    12:30 PM 1/16/2025    10:14 AM   PROMIS-10 Scores Only   Global Mental Health Score 13  14    Global Physical Health Score 14  16    PROMIS TOTAL - SUBSCORES 27  30        Patient-reported

## 2025-04-28 ENCOUNTER — OFFICE VISIT (OUTPATIENT)
Dept: PSYCHIATRY | Facility: CLINIC | Age: 20
End: 2025-04-28
Attending: SOCIAL WORKER
Payer: COMMERCIAL

## 2025-04-28 DIAGNOSIS — F31.10 BIPOLAR I DISORDER, MOST RECENT EPISODE (OR CURRENT) MANIC (H): Primary | ICD-10-CM

## 2025-04-28 NOTE — PROGRESS NOTES
[facility]  Psychiatry Clinic  First Episode of Bipolar - STRIDE Program  Increasing Resiliency in Life aka IRL (individual therapy)  Clinician Contact & Psychotherapy Progress Note      Patient: Yoseph Grayson (2005), MRN: [id number]  Date: 4/28/25  Diagnosis:   1. Bipolar I disorder, most recent episode (or current) manic (H)       Clinician: aMr aYng  Attendees: Patient      Service Type: Individual  Extended Session (60+ minutes): No  Interactive Complexity: No  Crisis: No    Length of Actual Contact: Start Time: 11:58; End Time: 12:46  In- Person at the [facility]: Yes      Data:  Progress on treatment objective & homework: Stable - ACTION (Actively working towards change); Intervened by reinforcing change plan / affirming steps taken      Medication utilization: No changes to current psychiatric medication(s)   Notable medication updates/questions/concerns since our last visit include none.  Writer will notify assigned prescriber, RN team, and Pharmacist for follow-up.     Current / Ongoing Stressors and Urgent Concerns: Yoseph has been actively searching for jobs in [place] in order for him to move there in June    Intervention:  Motivational Interviewing- Expressed Empathy/Understanding, Supported Autonomy, Collaboration, Evocation, Permission to raise concern or advise, Open-ended questions, Reflections: simple and complex, Change talk (evoked), and Reframe / Response to change talk- E - Evoked more info from patient about behavior change, A - Affirmed patient's thoughts, decisions, or attempts at behavior change, R - Reflected patient's change talk, and S - Summarized patient's change talk statements  CBT Teaching Strategies- Pleasant Activity Scheduling (scheduling activities in the near future that you can look forward to, introduced more positivity around engagement in future activities, and reduce negative thinking about future activities),  "Relapse prevention planning (review of stressors and early warning signs), Recognizing the positive (visualize and discuss the best parts of the day to promote positive thinking patterns) , Play the Script Until the End (imagine the outcome of the worst case scenario, let the scenario play out, and recognize that even if everything feared happens, it will likely turn out okay), Reinforcement and shaping (positive feedback for steps towards goals and gains in knowledge & skills), and Behavioral Experiments (engage in a \"what if\" consideration, test existing beliefs, test more adaptive beliefs, and modify unhelpful beliefs)     Assessment/Progress Note:   Yoseph attended today's session and denied any SI or safety concerns. He shared ongoing conflict with his parents regarding his planned move to [place] in June. While Yoseph is eager to relocate, his parents have expressed concerns related to his current lack of employment. Together, we explored strategies for Yoseph to communicate more effectively with his parents about his reasons for wanting to move and to address their financial concerns, including identifying potential steps he can take to increase their confidence in his readiness. Yoseph stated \"I'm at the end of an era and am ready to start a new adventure\". Since this is our second-to-last session, we also reviewed the treatment goals established at the beginning of therapy and reflected on his progress.     Focus for today's session: Review of Treatment Plan    Assessed symptom presence and potential triggers for the patient.  Yoseph reports that symptoms include social anxiety, which is stable. Our continued goal is promoting return to functioning in emotional regulation, thought process and social relationships, attaining control over disturbing thoughts, interact appropriately in social situations, improve coping skills to deal with stress and interpersonal relationships, provide supportive " therapy, psychoeducation, and understanding stressors that trigger psychotic episodes.      Review of Treatment Plan    Writer used a relational and interpersonal approach to explore feelings, motivations, and behavior. Writer offered support, feedback, validation, and reinforced use of skills taught in IRT from modalities including cognitive behavioral therapy, psycho education, and skills training. Promoted understanding of their experiences of psychosis and how it impacts them in important areas of their life and in recovery goals. Reflected on client's strengths and resiliency factors and facilitated discussion on how these can assist in symptom management, recovery, and well-being.    Overall Yoseph was cooperative, attentive, and engaged throughout the session.  Observed symptoms include: no unusual symptoms outside of baseline.  As of today's appt insight to their mental illness appears good.  Progress toward goal completion seems good.    Safety:   Risk status (Self / Other harm or suicidal ideation)   denies current fears or concerns for personal safety.   denies current or recent suicidal ideation or behaviors. - CSSRS completed below.    denies current or recent homicidal ideation or behaviors.   denies current or recent self injurious behavior or ideation.   denies other safety concerns.   reports there has been no change in risk factors since their last session.     reports there has been no change in protective factors since their last session.     Recommended that patient call 911 or go to the local ED should there be a change in any of these risk factors    Bennett Protocol Risk Identification:  1) Have you wished you were dead or wished you could go to sleep and not wake up? No  2) Have you actually had any thoughts about killing yourself? No  If YES to 2, answer questions 3, 4, 5, 6  If NO to 2, go directly to question 6  3) Have you thought about how you might do this? N/A  4) Have you had any  intension of acting on these thoughts of killing yourself, as opposed to you have the thoughts but you definitely would not act on them? N/A  5) Have you started to work out or worked out the details of how to kill yourself? Do you intend to carry out this plan? N/A  Always Ask Question 6  6) Have you done anything, started to do anything, or prepared to do anything to end your life? No  Examples: collected pills, obtained a gun, gave away valuables, wrote a will or suicide note, held a gun but changed your mind, cut yourself, tried to hang yourself, etc.  RISK INDICATOR & ACTION BY CLINICIAN: Patient denied any current/recent/lifetime history of suicidal ideation and/or behaviors.  No safety plan indicated at this time.       Discussed overall safety plan should symptoms feel unmanageable or safety concerns become imminent. Yoseph was able to contract for safety.   Crisis Numbers: After hours:  [phone]   Call or Text [phone] or [facility] [phone]    Mental Status Exam:  Appearance: Appropriate   Eye Contact: Good   Psychomotor Behavior: Normal   Attitude: Cooperative   Orientation: All  Speech Rate: Normal/ Responsive Normal   Volume: Normal   Mood: Normal  Affect: Appropriate   Thought Content: Clear   Thought Form: Coherent  Logical   Insight: Good     Plan/Referrals:   Yoseph is participating in coordinated speciality care via the First Episode of Bipolar - STRIDE Program within our clinic.  I will meet with Yoseph weekly  for Increasing Resiliency in Life aka IRL (individual therapy), therapy, and support aimed at maximizing Yoseph's opportunity for recovery from extreme symptoms states of psychosis, joanna, and/or depression.      Home practice was identified as N/A  Next topic to consider: Termination with provider  Next session: 1 week    PROVIDER: Mar Yang      Patient reviewed in supervision with OMID Giraldo who will sign the note.       Additional screening measures  "(Complete every 90 days)       PHQ, TAURUS, PROMIS, MIRECC GAF, and IMR Scales (reviewed every 90 days)  PHQ9:       1/8/2025    12:18 PM 1/16/2025    10:12 AM 2/28/2025    12:12 PM 3/31/2025    11:49 AM 4/3/2025     2:15 PM 4/7/2025    11:48 AM   PHQ-9 SCORE   PHQ-9 Total Score MyChart 3 (Minimal depression) 5 (Mild depression) 4 (Minimal depression) 3 (Minimal depression) 4 (Minimal depression) 1 (Minimal depression)   PHQ-9 Total Score 3  5  4  3  4  1        Patient-reported     GAD7:        No data to display              PROMIS 10-Global Health (only subscores and total score):       1/8/2025    12:30 PM 1/16/2025    10:14 AM 4/21/2025    11:59 AM   PROMIS-10 Scores Only   Global Mental Health Score 13  14  14    Global Physical Health Score 14  16  14    PROMIS TOTAL - SUBSCORES 27  30  28        Patient-reported       Date of Initial session: 1/23/25  Date of INTIAL Treatment Plan: 1/25/25  Last Review/Update Date:  N/A     Today's Date: 1/23/2025   Next 90-Day Review Due:  4/28/25     Treatment plan was initiated and completed at this visit.          Individual Resiliency Training Treatment Goals      Measurable Objectives Interventions Target Dates & Discharge Criteria   Individual s Objectives     -Identify primary styles of thinking, and demonstrate understanding of and use cognitive restructuring to successfully deal with negative feelings     -Establish and adhere to a plan to increase physical exercise  -Devise a consistent sleep routine     -Identify support system  -Strengthen the social support network with friends by initiating social contact and participating in social activities with peers      -Explore areas of interest for continued educational opportunities         In Yoseph's own words: \"I feel like I'm at a fork in the road. I'm a little lost on what I should do\" IRT/IRL   Psychotherapy  -Psychoeducation  -Motivation interviewing  -CBT  -Behavioral activation  -Mindfulness     Coordination " with other Strengths Program Team Members:  - Supported Education and Employment  - Medication Management  - Family Education and Support  - Social Work Care Coordination  - Pharmacist  - Psychometrist Target date:   12-36 months from enrollment     Discharge criteria:  Marked and sustained symptom improvement      Yoseph demonstrates understanding of mental illness      Yoseph successfully implements strategies to cope with stressors and/or symptoms to mitigate risk for increase in symptom severity or relapse     Gains Made:

## 2025-04-30 DIAGNOSIS — F31.9 BIPOLAR 1 DISORDER (H): ICD-10-CM

## 2025-04-30 DIAGNOSIS — R63.5 WEIGHT GAIN: ICD-10-CM

## 2025-05-01 ENCOUNTER — OFFICE VISIT (OUTPATIENT)
Dept: PSYCHIATRY | Facility: CLINIC | Age: 20
End: 2025-05-01
Attending: NURSE PRACTITIONER
Payer: COMMERCIAL

## 2025-05-01 VITALS
DIASTOLIC BLOOD PRESSURE: 71 MMHG | SYSTOLIC BLOOD PRESSURE: 132 MMHG | BODY MASS INDEX: 27.38 KG/M2 | WEIGHT: 196.3 LBS | HEART RATE: 89 BPM

## 2025-05-01 DIAGNOSIS — F31.9 BIPOLAR 1 DISORDER (H): ICD-10-CM

## 2025-05-01 DIAGNOSIS — R63.5 WEIGHT GAIN: ICD-10-CM

## 2025-05-01 PROCEDURE — 99214 OFFICE O/P EST MOD 30 MIN: CPT | Performed by: NURSE PRACTITIONER

## 2025-05-01 RX ORDER — BUPROPION HYDROCHLORIDE 150 MG/1
150 TABLET ORAL EVERY MORNING
Qty: 30 TABLET | Refills: 2 | Status: SHIPPED | OUTPATIENT
Start: 2025-05-01

## 2025-05-01 RX ORDER — LITHIUM CARBONATE 300 MG/1
900 CAPSULE ORAL AT BEDTIME
Qty: 90 CAPSULE | Refills: 2 | Status: SHIPPED | OUTPATIENT
Start: 2025-05-01

## 2025-05-01 RX ORDER — LITHIUM CARBONATE 300 MG/1
900 CAPSULE ORAL AT BEDTIME
Qty: 270 CAPSULE | Refills: 1 | OUTPATIENT
Start: 2025-05-01

## 2025-05-01 RX ORDER — OLANZAPINE 5 MG/1
5 TABLET, FILM COATED ORAL
Qty: 90 TABLET | Refills: 1 | OUTPATIENT
Start: 2025-05-01

## 2025-05-01 RX ORDER — LITHIUM CARBONATE 150 MG/1
150 CAPSULE ORAL AT BEDTIME
Qty: 30 CAPSULE | Refills: 0 | Status: SHIPPED | OUTPATIENT
Start: 2025-05-01

## 2025-05-01 ASSESSMENT — PAIN SCALES - GENERAL: PAINLEVEL_OUTOF10: NO PAIN (0)

## 2025-05-01 NOTE — PROGRESS NOTES
[facility]  Psychiatry Clinic  PSYCHIATRY CLINIC FOLLOW UP VISIT     CARE TEAM:  PCP- Specialists, [facility]   Yoseph is a 19 year old who prefers the name Yoseph and uses pronouns he, they.     DIAGNOSES, ASSESSMENT & PLAN                                                                                          Diagnostic Impressions (provisional)  Bipolar 1 Disorder with psychosis, mre depressed    Yoseph is a 19 year old male who reports a psychiatric history significant for multiple episodes of depressions with onset at approx age 12-13, and a first episode of joanna in May 2024 with inpatient admission at [facility].  There is a family history of depression and suicide.  Manic episode occurred in the context of recently starting zoloft + intermittent cannabis use.  He was stabilized on lithium and olanzapine.  Following resolution of jaonna he experienced an episode of depression which improved with Wellbutrin.    -Today, he reports mood is stable overall and he tolerated recent taper off of olanzapine without concern for mood symptoms.  Since discontinuing olanzapine, he has lost 10 lbs  Lithium is now WNL with recent dose increase.  He will keep olanzapine PRN on hand for any s/s of joanna, and will reach out to the clinic or seek emergency services should these occur, reviewed early warning signs today. He denies SI/intent or plan and risk of harm to self or others is low.     Bipolar disorder  Continue Bupropion  mg daily   Continue Lithium 1050 mg at bedtime  Continue Olanzapine 5 mg daily PRN, on hand for a back-up medication    SGA induced weight gain  Continue metformin 500 mg daily BID    Lithium, SGA monitoring  9/25/24: TSH/T4, lipid panel  1/23/25: BMP  2/3/25: Lithium level     RTC: 4 weeks or sooner if needed   CRISIS Numbers:   Provided routinely in AVS        CHIEF CONCERN                                Bipolar disorder,  medication SE    Interim History                                                   -Yoseph is a 19 year old who recently enrolled in the [facility] early bipolar disorder program.  DA with me was on 1/16.  He was diagnosed with bipolar disorder during an inpatient admission from [facility], where he was admitted from 5/2 - 5/9/24.    -Last seen by me on 4/3 at which time olanzapine 2.5 mg daily was discontinued and metformin was increased to 500 mg BID. He reports these medication changes went well, no concerns for joanna or psychosis.  Mood is stable.  Is sleeping 8-10 hrs per night. Is less fatigued during the day especially in the AM. Takes approx with 45 min to fall asleep. Anxiety and rumination have been minimal.  Has been having less anxiety in social situations. Denies any s/s of joanna or psychosis. Denies death ideation or SI.  -Hopes to move to [place] in June, will be starting [employer] in the fall. Currently working 18 hrs per week.   -Has been processing anniversary of manic episode and what he has experienced over the last year. Is feeling good about how far he has come in the past year.   -No substance since last visit   -Has recently started running for exercise.   -He is wondering if he might have ADHD and would like further assessment of this.     Medication SE:  Weight gain of 40 lbs on olanzapine;  Baseline weight is 170 lbs, currently 196 lbs.  Appetite is improved with metformin + discontinuation of olanzapine, has noticed sensation of being full has returned.  Sedation as above. Denies N/V/D or constipation.  Denies increase in thirst +  urination frequency.  Drinks 96 oz daily. Denies tremor, cognitive SE, sexual SE.      /71   Pulse 89   Wt 89 kg (196 lb 4.8 oz)   BMI 27.38 kg/m      Current psychiatric medications  Bupropion  mg daily   Lithium 1050 mg at bedtime  Metformin 500 mg daily BID   Olanzapine 5 mg at bedtime PRN - backup medication     Recent Substance Use:     Alcohol - 1x per month, 4 drinks per occasion  Cannabis - no recent use    SOCIAL and FAMILY HISTORY   (no change today)                                              per pt report         As documented in 12/3 note by Hanh Wilson, reviewed and updated as needed today.   Family Hx:  Mother eating disorder, anxiety and depression  reports history of completed suicides. Uncle completed suicide.    Social Hx:    Living situation: Yoseph currently lives in [place] with parents. Yoseph reports home life is going well.  Housing Type: house with parents  Guns, weapons, or other means to harm oneself in the home?  Unknown  Relationships: Significant relationships include Parents and friend Brigido.        Education: Yoseph's highest level of education is high school graduate. Was going to school for Music Education but switched to Elementary Education. Educational goals include finishing elementary education degree and returning to [employer]  Occupation: .  Occupational goals include to get an additional job and to eventually go back to school for elementary education.  Spiritual considerations: Patient does not identify with [place] community.  Is spiritual but is not as into it as he used to be.   Cultural influences: Yoseph describes their race as white and [place]. Yoseph's primary spoken language is English. Yoseph identifies their sexual orientation as questioning, and their gender as male. Yoesph prefers he/they pronouns.   Current Stressors: Living at home with parents, feelings of loneliness and depression.   Strengths & Opportunities:  Hobbies and enjoyable activities include Reading and Listening to Music. Spending time outside and playing video games. Likes to read comic books. Self identified strengths are good with people and social. Exercise and nutrition habits include worries about gaining weight from the Wellbutrin.  Coping mechanisms include Meditation, Therapy, and Family.   Legal  Hx: Yes: got caught with an edbile and had to do a class online    Trauma and/or Abuse Hx: There are no indications or report of: significant losses, trauma, abuse or neglect. Issues of possible neglect are not present. Identifies the hospital as a traumatic experience.   Hx: No    PAST PSYCH and SUBSTANCE USE HISTORY   (no change today)                   -Remembers first feeling depressed in the 7th grade.  Recalls approx 3 episodes of depression per year, lasting approx 1 month.   Felt he had some depression at baseline and then outside factors would make it worse.  Describes these episodes as low mood, anhedonia, negative thinking about himself (feeling he was a bad person, something wrong with him), low energy, hypersomnia (12 hrs per day).  He has experienced SI in the past with depression episodes.  Denies ever experiencing suicide intent or plan.  Was started on prozac in 2020, which helped prevent more significant depressions, but did not help with baseline depression and also made him feel numb.  He took prozac until some time in 2023, does not recall the dose.  Was prescribed by his pediatrician.  After coming off the prozac his mood got worse.  He had two more episodes of depressions in the context of interpersonal stressors (difficult roommate situation and a break up).  He sought [facility] at the [facility] and was started on Zoloft in April 2023.  Highest dose was 25-50 mg.  During this period he was using cannabis (delta-8 gummies) once every few weeks, and alcohol once every month. Highest use of cannabis was in the summer of 2023, daily use, but when he started the semester again he reduced use.  Denies any impact on mood or psychosis symptoms with cannabis use.   -He reports within 3-4 wks of starting zoloft in April 2023, he began to experience an increase in energy and a decrease in sleep (3 hrs or less per night, sometimes not sleeping at all).  Mood was elevated.  One night he took  a cannabis edible, and the next morning he tarted to experince symptoms of psychosis, including AH (characterized by positive conversations with people he knows), unusual beliefs that he thought he had superpowers (could live forever, could fly), ability to telepathically communicate,  concerned the government would find him and want to do research on his new abilities.  Had been texting his ex girlfriend strange content, including that he had killed  someone, and she called the police.  He was  taken to the ED and then admitted to to inpatient psychiatry.  Symptoms began to stabilize with olanzapine, ativan, and lithium.  Was held on a 72 hr hold for some of his stay    Psychiatric Hospitalizations: One hospitalization in May 2024 at [facility] for first episode of joanna, 5/2 - 5/9/24  Commitment: No, Current Castillo order: No  History of Electroconvulsive Therapy (ECT) or Transcranial Magnetic Stimulation (TMS): No     Self-Injurious Behavior: Denies  Suicidal Ideation Hx: Yes - has history of ideation but has called suicide hotline  Suicide Attempt- #-:No  Violence/Aggression Hx: No    Past medication trials:  Prozac  Sertraline 25-50 mg, precipitated joanna  Ativan while admitted    Recent medication changes:  2/6/25 decrease olanzapine from 10 mg to 5 mg daily      Outpatient Programs & Services [Psychotherapy, DBT, Day Treatment, Eating Disorder Tx, etc]:   Current:  Currently has an outpatient therapist. Cm Don as psychiatrist at [facility]     Past:  Has had previous therapists.Had previous hospitalization for manic episode.     Substance Use:  Past Use - Alcohol-  intermittent   and Cannabis-  monthly use since college, summer 2023 highest use at most days    No hx of substance use treatment     MEDICAL HISTORY     Patient Active Problem List   Diagnosis    Bipolar affective disorder, currently manic, moderate (H)    Overweight (BMI 25.0-29.9)     Concussion in 2016, swimming     ALLERGIES: Patient has no  known allergies.     MEDICAL REVIEW OF SYSTEMS                                                                  A comprehensive review of systems was performed and is negative other than noted in the HPI.    CURRENT MEDS       Current Outpatient Medications   Medication Sig Dispense Refill    buPROPion (WELLBUTRIN XL) 150 MG 24 hr tablet Take 1 tablet (150 mg) by mouth every morning. 30 tablet 2    lithium (ESKALITH) 150 MG capsule Take 1 capsule (150 mg) by mouth at bedtime. Take with three 300 mg tablets for a total bedtime dose of 1050 mg. 30 capsule 0    lithium 300 MG capsule Take 3 capsules (900 mg) by mouth at bedtime. 90 capsule 2    metFORMIN (GLUCOPHAGE) 500 MG tablet Take 1 tablet (500 mg) by mouth 2 times daily (with meals). 60 tablet 2    OLANZapine (ZYPREXA) 5 MG tablet Take 1 tablet (5 mg) by mouth nightly as needed (joanna). 30 tablet 1       VITALS                                                                                              /71   Pulse 89   Wt 89 kg (196 lb 4.8 oz)   BMI 27.38 kg/m      MENTAL STATUS EXAM                                                             Alertness: alert  and oriented  Appearance: casually groomed  Behavior/Demeanor: cooperative and pleasant, with good  eye contact   Speech: normal  Language: intact  Psychomotor: normal or unremarkable  Mood: description consistent with euthymia  Affect:  euthymic ; congruent to: mood- yes, content- yes  Thought Process/Associations: unremarkable  Thought Content:  Reports none;  Denies suicidal & violent ideation and delusions  Perception:  Reports none;  Denies hallucinations  Insight: adequate  Judgment: adequate for safety  Cognition: attention span: intact  concentration: intact  recent memory: intact  remote memory: intact  fund of knowledge: appropriate  Gait and Station: unremarkable    LABS and DATA         3/31/2025    11:49 AM 4/3/2025     2:15 PM 4/7/2025    11:48 AM   PHQ   PHQ-9 Total Score 3  4  1     Q9: Thoughts of better off dead/self-harm past 2 weeks Not at all Not at all Not at all       Patient-reported       Recent Labs   Lab Test 01/23/25  1054   CR 1.01   GFRESTIMATED >90     No lab results found.      RISK STATEMENT for SAFETY   Yoseph Hameed did not appear to be an imminent safety risk to self or others.  Denies current SI, intent or plan.     TREATMENT RISK STATEMENT:  The risks, benefits, alternatives and potential adverse effects have been discussed and are understood by the pt. The pt understands the risks of using street drugs or alcohol. There are no medical contraindications, the pt agrees to treatment with the ability to do so. The pt knows to call the clinic for any problems or to access emergency care if needed.  Medical and substance use concerns are documented above.  Psychotropic drug interaction check was done, including changes made today.    PROVIDER:  JACK Casiano CNP      Psychiatry Individual Psychotherapy Note   Psychotherapy start time -310   Psychotherapy end time -326  Date treatment plan last reviewed with patient - 5/1/2025    Subjective: This supportive psychotherapy session addressed issues related to goals of therapy and current psychosocial stressors. Patient's reaction: Action in the context of mental status appropriate for ambulatory setting.    Interactive complexity indicated? No  Plan: RTC in timeframe noted above  Psychotherapy services during this visit included myself and the patient.   Treatment Plan      SYMPTOMS; PROBLEMS   MEASURABLE GOALS;    FUNCTIONAL IMPROVEMENT / GAINS INTERVENTIONS DISCHARGE CRITERIA   Bipolar Disorder   Prevent joanna, depression  Learn skills for managing mood symptoms long term Supportive / psychodynamic marked symptom improvement        Medical decision making     Level of Medical Decision Making:   - At least 2 stable chronic problems  - Engaged in prescription drug management during visit (discussed any medication  benefits, side effects, alternatives, etc.)          The longitudinal plan of care for the diagnosis(es)/condition(s) as documented were addressed during this visit. Due to the added complexity in care, I will continue to support Yoseph in the subsequent management and with ongoing continuity of care.

## 2025-05-01 NOTE — PATIENT INSTRUCTIONS
**For crisis resources, please see the information at the end of this document**   Patient Education    Thank you for coming to the [facility] [place] [facility].     Lab Testing:  If you had lab testing today and your results are reassuring or normal they will be mailed to you or sent through [employer] within 7 days. If the lab tests need quick action we will call you with the results. The phone number we will call with results is # [phone]. If this is not the best number please call our clinic and change the number.     Medication Refills:  If you need any refills please call your pharmacy and they will contact us. Our fax number for refills is [phone].   Three business days of notice are needed for general medication refill requests.   Five business days of notice are needed for controlled substance refill requests.   If you need to change to a different pharmacy, please contact the new pharmacy directly. The new pharmacy will help you get your medications transferred.     Contact Us:  Please call [phone] during business hours (8-5:00 M-F).   If you have medication related questions after clinic hours, or on the weekend, please call [phone].     Financial Assistance [phone]   Medical Records [phone]       MENTAL HEALTH CRISIS RESOURCES:  For a emergency help, please call 911 or go to the nearest Emergency Department.     Emergency Walk-In Options:   EmPATH Unit @ [facility] ([place]): [phone] - Specialized mental health emergency area designed to be calming  [facility] ([place]): [phone]  [facility] ([place]): [phone]  [facility]): [phone]    [facility] Crisis Information:   [place]: [phone]  [place]: [phone]  [place] ([place]) - Adult: [phone]     Child: [phone]  [place] - Adult: [phone]     Child: [phone]  [place]:  [phone]  List of all [facility] resources:   https://[place].gov/dhs/people-we-serve/adults/health-care/mental-health/resources/crisis-contacts.jsp    National Crisis Information:   Crisis Text Line: Text  [place]  to 670821  Suicide & Crisis Lifeline: 988  National Suicide Prevention Lifeline: 1-223-702-TALK (1-625.681.8255)       For online chat options, visit https://suicidepreventionlifeline.org/chat/  Poison Control Center: 6-763-414-2958  Trans Lifeline: 7-124-778-6024 - Hotline for transgender people of all ages  The Ricardo Project: 3-530-713-1937 - Hotline for LGBT youth     For Non-Emergency Support:   Fast Tracker: Mental Health & Substance Use Disorder Resources -   https://www.[place].org/

## 2025-05-20 ENCOUNTER — APPOINTMENT (OUTPATIENT)
Dept: PSYCHIATRY | Facility: CLINIC | Age: 20
End: 2025-05-20
Attending: SOCIAL WORKER
Payer: COMMERCIAL

## 2025-05-20 ASSESSMENT — PATIENT HEALTH QUESTIONNAIRE - PHQ9
SUM OF ALL RESPONSES TO PHQ QUESTIONS 1-9: 6
SUM OF ALL RESPONSES TO PHQ QUESTIONS 1-9: 6
10. IF YOU CHECKED OFF ANY PROBLEMS, HOW DIFFICULT HAVE THESE PROBLEMS MADE IT FOR YOU TO DO YOUR WORK, TAKE CARE OF THINGS AT HOME, OR GET ALONG WITH OTHER PEOPLE: SOMEWHAT DIFFICULT

## 2025-05-24 DIAGNOSIS — F31.9 BIPOLAR 1 DISORDER (H): ICD-10-CM

## 2025-05-25 ENCOUNTER — MYC MEDICAL ADVICE (OUTPATIENT)
Dept: PSYCHIATRY | Facility: CLINIC | Age: 20
End: 2025-05-25
Payer: COMMERCIAL

## 2025-05-27 ENCOUNTER — APPOINTMENT (OUTPATIENT)
Dept: PSYCHIATRY | Facility: CLINIC | Age: 20
End: 2025-05-27
Attending: SOCIAL WORKER
Payer: COMMERCIAL

## 2025-05-27 DIAGNOSIS — F31.9 BIPOLAR 1 DISORDER (H): Primary | ICD-10-CM

## 2025-05-27 PROCEDURE — 90837 PSYTX W PT 60 MINUTES: CPT | Performed by: SOCIAL WORKER

## 2025-05-27 RX ORDER — LITHIUM CARBONATE 300 MG/1
900 CAPSULE ORAL AT BEDTIME
Qty: 270 CAPSULE | Refills: 1 | OUTPATIENT
Start: 2025-05-27

## 2025-05-27 ASSESSMENT — PATIENT HEALTH QUESTIONNAIRE - PHQ9
SUM OF ALL RESPONSES TO PHQ QUESTIONS 1-9: 7
10. IF YOU CHECKED OFF ANY PROBLEMS, HOW DIFFICULT HAVE THESE PROBLEMS MADE IT FOR YOU TO DO YOUR WORK, TAKE CARE OF THINGS AT HOME, OR GET ALONG WITH OTHER PEOPLE: SOMEWHAT DIFFICULT
10. IF YOU CHECKED OFF ANY PROBLEMS, HOW DIFFICULT HAVE THESE PROBLEMS MADE IT FOR YOU TO DO YOUR WORK, TAKE CARE OF THINGS AT HOME, OR GET ALONG WITH OTHER PEOPLE: SOMEWHAT DIFFICULT

## 2025-05-27 NOTE — TELEPHONE ENCOUNTER
"Date of Last Office Visit:   5/1/2025  [facility]  Mayela Martin APRN CNP     RTC:  4 weeks or sooner if needed   No shows: 0  Cancellations: 0  Date of Next Office Visit:   5/29/2025 2:00 PM (60 min)  [place]    Arrive by:  1:45 PM   [facility]    URPSY ([facility])   Mayela Martin APRN CNP     ------------------------------    Last Script Details::   Disp Refills Start End JORDY   lithium 300 MG capsule 90 capsule 2 5/1/2025 -- No   Sig - Route: Take 3 capsules (900 mg) by mouth at bedtime. - Oral     ------------------------------  From last visit note:   \"Continue Lithium 1050 mg at bedtime \"    Lapse in medication adherence greater than 3 days?:  No    Refill Decision:    [x]Medication unable to be refilled by RN due to criteria not met as indicated below:   Other:should have refills on file        Request from pharmacy:  Requested Prescriptions   Pending Prescriptions Disp Refills    lithium 300 MG capsule [Pharmacy Med Name: LITHIUM CARBONATE 300 MG CAP] 270 capsule 1     Sig: TAKE 3 CAPSULES (900 MG) BY MOUTH AT BEDTIME.       There is no refill protocol information for this order                "

## 2025-05-29 ENCOUNTER — VIRTUAL VISIT (OUTPATIENT)
Dept: PSYCHIATRY | Facility: CLINIC | Age: 20
End: 2025-05-29
Attending: NURSE PRACTITIONER
Payer: COMMERCIAL

## 2025-05-29 DIAGNOSIS — F31.9 BIPOLAR 1 DISORDER (H): Primary | ICD-10-CM

## 2025-05-29 ASSESSMENT — PAIN SCALES - GENERAL: PAINLEVEL_OUTOF10: NO PAIN (0)

## 2025-05-29 NOTE — PROGRESS NOTES
[facility]  Psychiatry Clinic  PSYCHIATRY CLINIC FOLLOW UP VISIT     CARE TEAM:  PCP- Specialists, [facility]   Yoseph is a 19 year old who prefers the name Yoseph and uses pronouns he, they.     DIAGNOSES, ASSESSMENT & PLAN                                                                                          Diagnostic Impressions  Bipolar 1 Disorder with psychosis, mre depressed  ADHD, provisional, assessment ongoing    Yoseph is a 19 year old male who reports a psychiatric history significant for multiple episodes of depressions with onset at approx age 12-13, and a first episode of joanna in May 2024 with inpatient admission at [facility].  There is a family history of depression and suicide.  Manic episode occurred in the context of recently starting zoloft + intermittent cannabis use.  He was stabilized on lithium and olanzapine.  Following resolution of joanna he experienced an episode of depression which improved with Wellbutrin.    -Today, he reports mood is stable overall and he tolerated recent taper off of olanzapine without concern for mood symptoms.  We started an assessment for ADHD today guided by the DIVA interview.  He endorsed 9/9 questions for inattention in adulthood (7/9 childhood) and 9/9 hyperactivity symptoms in both adulthood and childhood.  He describes inattention symptoms as mild overall, with more prominent hyperactivity symptoms.  Collateral information from parents may be helpful to confirm diagnosis of ADHD, but he does overall appear to meet criteria for this diagnosis, with subsequent impairments noted in work/school, relationships, and self-esteem/confidence.  We will further explore ADHD impacts on life course and functioning as well as treatment options at next visit.   -He will keep olanzapine PRN on hand for any s/s of joanna, and will reach out to the clinic or seek emergency services should these occur, reviewed  early warning signs today. He denies SI/intent or plan and risk of harm to self or others is low.     Bipolar disorder  Continue Bupropion  mg daily   Continue Lithium 1050 mg at bedtime  Continue Olanzapine 5 mg daily PRN, on hand for a back-up medication    SGA induced weight gain  Continue metformin 500 mg daily BID    Lithium, SGA monitoring  9/25/24: TSH/T4, lipid panel  1/23/25: BMP  2/3/25: Lithium level     RTC: 4 weeks or sooner if needed   CRISIS Numbers:   Provided routinely in AVS        CHIEF CONCERN                                Bipolar disorder, medication SE    Interim History                                                   -Yoseph is a 19 year old who recently enrolled in the [employer] early bipolar disorder program.  DA with me was on 1/16.  He was diagnosed with bipolar disorder during an inpatient admission from [facility], where he was admitted from 5/2 - 5/9/24.    -Last seen by me on 5/1/25 at which time no medication changes were made.  He reports today that things have going well overall, no significant changes in the last month.  Denies persistent depression, joanna, psychosis, SI.  Denies new or significant medication SE.     -Today, we are starting an ADHD evaluation using the DIVA. He reports that the primary symptoms of ADHD he experiences are sustaining attention, long-term planning, and others have suggested he should get evaluated.  He did not have teachers suggest ADHD as a child, but he was given fidgets as a child to help him focus. No known family hx of ADHD.   On the DIVA, he endorses, 1) difficulty with attention to detail on tasks he is not very engaged in.  He is able to stay focused with attention to detail at work in his  position. He reports difficulty with paperwork, difficulty with reading instructions (leads to missing things); as a child made mistakes in school due to not reading questions/leaving them blank/making careless mistakes;  "2) difficulty sustaining attention (quickly becoming distracted or bored, not being able to watch a movie to the end or read books without a lot frustration to stay on task; does not recall if these symptoms were present in childhood; 3) difficulty following instructions or finishing tasks (also present in childhood); 3) organization - some difficulty with planning out his day and following through on this, disorganized bedroom, double books himself, increased awareness of time and a lot of anxiety about being late; 4) avoiding things that require sustained mental effort - puts things off as long as possible, missing deadlines or getting very close to deadlines; doing easiest things first (these were present in childhood); 5) loses thing needed for activities (misplaces keys, airpods/leaves things outside), as a kid - moved things around a lot and forgot, losing water bottles; 5) easily distracted by internal or external stimuli; 6) feeling forgetful in daily activities (forgets tasks, returning to house to get forgotten things, forgets appointments or other obligations (present as a kid but doesn't remember as much)  -Denies - not seeming as though he is listening when spoken to directly   -Overall, describes inattention symptoms as mild  Hyperactivity questions: Endorses - 1) restless/fidgety (adult + childhood); 2) standing often (not as a kid); 3) restlessness - urge to move, constantly feeling like he has to do something (child - active in the house, bouncing around, climbing trees); 4) difficult engaging in things quietly - talking during times its not appropriate, being overly confident in public (\"a little bit too much\"), difficulty speaking softly;  5) acting as if driven by a motor (also as a kid); 6) talking excessively 7) giving answer before questions have been completed; 8) difficulty waiting turn as a kid  but not as much in adulthood, although this does show up in relationships and jobs at times; 9) " interrupting or intruding on others - mild, but does interrupt others, has opinions that he expresses immediately (not present as a child)  -Overall, feels that hyperactivity/impulsivity is more prominent vs inattention symptoms. Feels he had many of these earlier in childhood but overall 7th grade is when he noticed more issues with inattention/hyperactivity. These symptoms have impacted education/work, relationships, free time/hobbies;      Medication SE:  Weight gain of 40 lbs on olanzapine;  Baseline weight is 170 lbs, currently 196 lbs.  Appetite is improved with metformin + discontinuation of olanzapine, has noticed sensation of being full has returned.  Sedation as above. Denies N/V/D or constipation.  Denies increase in thirst +  urination frequency.  Drinks 96 oz daily. Denies tremor, cognitive SE, sexual SE.      There were no vitals taken for this visit.      Current psychiatric medications  Bupropion  mg daily   Lithium 1050 mg at bedtime  Metformin 500 mg daily BID   Olanzapine 5 mg at bedtime PRN - backup medication     Recent Substance Use:    Alcohol - 1x per month, 4 drinks per occasion  Cannabis - no recent use    SOCIAL and FAMILY HISTORY   (no change today)                                              per pt report         As documented in 12/3 note by Hanh Wilson, reviewed and updated as needed today.   Family Hx:  Mother eating disorder, anxiety and depression  reports history of completed suicides. Uncle completed suicide.    Social Hx:    Living situation: Yoseph currently lives in [place] with parents. Yoseph reports home life is going well.  Housing Type: house with parents  Guns, weapons, or other means to harm oneself in the home?  Unknown  Relationships: Significant relationships include Parents and friend Brigido.        Education: Yoseph's highest level of education is high school graduate. Was going to school for Music Education but switched to Elementary Education.  Educational goals include finishing elementary education degree and returning to [employer]  Occupation: .  Occupational goals include to get an additional job and to eventually go back to school for elementary education.  Spiritual considerations: Patient does not identify with ped[place] l[place] community.  Is spiritual but is not as into it as he used to be.   Cultural influences: Yoseph describes their race as white and Vietnamese. Yoseph's primary spoken language is English. Yoseph identifies their sexual orientation as questioning, and their gender as male. Yoseph prefers he/they pronouns.   Current Stressors: Living at home with parents, feelings of loneliness and depression.   Strengths & Opportunities:  Hobbies and enjoyable activities include Reading and Listening to Music. Spending time outside and playing video games. Likes to read comic books. Self identified strengths are good with people and social. Exercise and nutrition habits include worries about gaining weight from the Wellbutrin.  Coping mechanisms include Meditation, Therapy, and Family.   Legal Hx: Yes: got caught with an edbile and had to do a class online    Trauma and/or Abuse Hx: There are no indications or report of: significant losses, trauma, abuse or neglect. Issues of possible neglect are not present. Identifies the hospital as a traumatic experience.   Hx: No    PAST PSYCH and SUBSTANCE USE HISTORY   (no change today)                   -Remembers first feeling depressed in the 7th grade.  Recalls approx 3 episodes of depression per year, lasting approx 1 month.   Felt he had some depression at baseline and then outside factors would make it worse.  Describes these episodes as low mood, anhedonia, negative thinking about himself (feeling he was a bad person, something wrong with him), low energy, hypersomnia (12 hrs per day).  He has experienced SI in the past with depression episodes.  Denies ever experiencing suicide intent or  plan.  Was started on prozac in 2020, which helped prevent more significant depressions, but did not help with baseline depression and also made him feel numb.  He took prozac until some time in 2023, does not recall the dose.  Was prescribed by his pediatrician.  After coming off the prozac his mood got worse.  He had two more episodes of depressions in the context of interpersonal stressors (difficult roommate situation and a break up).  He sought care at the [facility] and was started on Zoloft in April 2023.  Highest dose was 25-50 mg.  During this period he was using cannabis (delta-8 gummies) once every few weeks, and alcohol once every month. Highest use of cannabis was in the summer of 2023, daily use, but when he started the semester again he reduced use.  Denies any impact on mood or psychosis symptoms with cannabis use.   -He reports within 3-4 wks of starting zoloft in April 2023, he began to experience an increase in energy and a decrease in sleep (3 hrs or less per night, sometimes not sleeping at all).  Mood was elevated.  One night he took a cannabis edible, and the next morning he tarted to experince symptoms of psychosis, including AH (characterized by positive conversations with people he knows), unusual beliefs that he thought he had superpowers (could live forever, could fly), ability to telepathically communicate,  concerned the government would find him and want to do research on his new abilities.  Had been texting his ex girlfriend strange content, including that he had killed  someone, and she called the police.  He was  taken to the ED and then admitted to to inpatient psychiatry.  Symptoms began to stabilize with olanzapine, ativan, and lithium.  Was held on a 72 hr hold for some of his stay    Psychiatric Hospitalizations: One hospitalization in May 2024 at [facility] for first episode of joanna, 5/2 - 5/9/24  Commitment: No, Current Castillo order: No  History of Electroconvulsive  Therapy (ECT) or Transcranial Magnetic Stimulation (TMS): No     Self-Injurious Behavior: Denies  Suicidal Ideation Hx: Yes - has history of ideation but has called suicide hotline  Suicide Attempt- #-:No  Violence/Aggression Hx: No    Past medication trials:  Prozac  Sertraline 25-50 mg, precipitated joanna  Ativan while admitted    Recent medication changes:  2/6/25 decrease olanzapine from 10 mg to 5 mg daily      Outpatient Programs & Services [Psychotherapy, DBT, Day Treatment, Eating Disorder Tx, etc]:   Current:  Currently has an outpatient therapist. Cm Don as psychiatrist at [facility]     Past:  Has had previous therapists.Had previous hospitalization for manic episode.     Substance Use:  Past Use - Alcohol- intermittent  and Cannabis- monthly use since college, summer 2023 highest use at most days   No hx of substance use treatment     MEDICAL HISTORY     Patient Active Problem List   Diagnosis    Bipolar affective disorder, currently manic, moderate (H)    Overweight (BMI 25.0-29.9)     Concussion in 2016, swimming     ALLERGIES: Patient has no known allergies.     MEDICAL REVIEW OF SYSTEMS                                                                  A comprehensive review of systems was performed and is negative other than noted in the HPI.    CURRENT MEDS       Current Outpatient Medications   Medication Sig Dispense Refill    buPROPion (WELLBUTRIN XL) 150 MG 24 hr tablet Take 1 tablet (150 mg) by mouth every morning. 30 tablet 2    lithium (ESKALITH) 150 MG capsule Take 1 capsule (150 mg) by mouth at bedtime. Take with three 300 mg tablets for a total bedtime dose of 1050 mg. 30 capsule 0    lithium 300 MG capsule Take 3 capsules (900 mg) by mouth at bedtime. 90 capsule 2    metFORMIN (GLUCOPHAGE) 500 MG tablet Take 1 tablet (500 mg) by mouth 2 times daily (with meals). 60 tablet 2    OLANZapine (ZYPREXA) 5 MG tablet Take 1 tablet (5 mg) by mouth nightly as needed (joanna). 30 tablet 1        VITALS                                                                                              There were no vitals taken for this visit.    MENTAL STATUS EXAM                                                             Alertness: alert  and oriented  Appearance: casually groomed  Behavior/Demeanor: cooperative and pleasant, with good  eye contact   Speech: normal  Language: intact  Psychomotor: normal or unremarkable  Mood: description consistent with euthymia  Affect: euthymic; congruent to: mood- yes, content- yes  Thought Process/Associations: unremarkable  Thought Content:  Reports none;  Denies suicidal & violent ideation and delusions  Perception:  Reports none;  Denies hallucinations  Insight: adequate  Judgment: adequate for safety  Cognition: attention span: intact  concentration: intact  recent memory: intact  remote memory: intact  fund of knowledge: appropriate  Gait and Station: unremarkable    LABS and DATA         4/7/2025    11:48 AM 5/20/2025    11:04 AM 5/27/2025    10:50 AM   PHQ   PHQ-9 Total Score 1  6  7    Q9: Thoughts of better off dead/self-harm past 2 weeks Not at all Not at all Not at all       Patient-reported       Recent Labs   Lab Test 01/23/25  1054   CR 1.01   GFRESTIMATED >90     No lab results found.      RISK STATEMENT for SAFETY   Yoseph Hameed did not appear to be an imminent safety risk to self or others.  Denies current SI, intent or plan.     TREATMENT RISK STATEMENT:  The risks, benefits, alternatives and potential adverse effects have been discussed and are understood by the pt. The pt understands the risks of using street drugs or alcohol. There are no medical contraindications, the pt agrees to treatment with the ability to do so. The pt knows to call the clinic for any problems or to access emergency care if needed.  Medical and substance use concerns are documented above.  Psychotropic drug interaction check was done, including changes made  today.    PROVIDER:  JACK Casiano CNP      Psychiatry Individual Psychotherapy Note   Psychotherapy start time -NA   Psychotherapy end time -NA  Date treatment plan last reviewed with patient - 5/1/2025    Subjective: This supportive psychotherapy session addressed issues related to goals of therapy and current psychosocial stressors. Patient's reaction: Action in the context of mental status appropriate for ambulatory setting.    Interactive complexity indicated? No  Plan: RTC in timeframe noted above  Psychotherapy services during this visit included myself and the patient.   Treatment Plan      SYMPTOMS; PROBLEMS   MEASURABLE GOALS;    FUNCTIONAL IMPROVEMENT / GAINS INTERVENTIONS DISCHARGE CRITERIA   Bipolar Disorder   Prevent joanna, depression  Learn skills for managing mood symptoms long term Supportive / psychodynamic marked symptom improvement        Medical decision making     90 min spent on the date of the encounter in chart review, patient visit, review of tests, documentation, care coordination, and/or discussion with other providers about the issues documented above. {      The longitudinal plan of care for the diagnosis(es)/condition(s) as documented were addressed during this visit. Due to the added complexity in care, I will continue to support Yoseph in the subsequent management and with ongoing continuity of care.

## 2025-05-29 NOTE — NURSING NOTE
Is the patient currently in the state of [place]? Y    Visit mode:VIDEO    If the visit is dropped, the patient can be reconnected by: VIDEO VISIT: Text to cell phone:   Telephone Information:   Mobile [phone]       Will anyone else be joining the visit? NO  (If patient encounters technical issues they should call [phone]    How would you like to obtain your AVS? MyChart    Are changes needed to the allergy or medication list? No    Are refills needed on medications prescribed by this physician? NO    Reason for visit: Follow Up    Amita CROW

## 2025-05-31 DIAGNOSIS — F31.9 BIPOLAR 1 DISORDER (H): ICD-10-CM

## 2025-06-02 RX ORDER — OLANZAPINE 5 MG/1
5 TABLET, FILM COATED ORAL
Qty: 30 TABLET | Refills: 0 | Status: SHIPPED | OUTPATIENT
Start: 2025-06-02

## 2025-06-02 NOTE — TELEPHONE ENCOUNTER
"Date of Last Office Visit: 5/29/25  RTC: 4 weeks or sooner if needed   No shows: 0  Cancellations: 0  Date of Next Office Visit: 0    ------------------------------    Last Script Details::    OLANZapine (ZYPREXA) 5 MG tablet 30 tablet 1 4/3/2025 -- No   Sig - Route: Take 1 tablet (5 mg) by mouth nightly as needed (salma). - Oral   Sent to pharmacy as: OLANZapine 5 MG Oral Tablet (zyPREXA)   Class: E-Prescribe   Order: [id number]     ------------------------------  From last visit note:   \"-He will keep olanzapine PRN on hand for any s/s of salma, and will reach out to the clinic or seek emergency services should these occur, reviewed early warning signs today. He denies SI/intent or plan and risk of harm to self or others is low.      Bipolar disorder  Continue Bupropion  mg daily   Continue Lithium 1050 mg at bedtime  Continue Olanzapine 5 mg daily PRN, on hand for a back-up medication\"    Lapse in medication adherence greater than 3 days?:  No    Refill Decision:    [x]Medication refilled per  Medication Refill in Ambulatory Care  policy.            Request from pharmacy:  Requested Prescriptions   Pending Prescriptions Disp Refills    OLANZapine (ZYPREXA) 5 MG tablet [Pharmacy Med Name: OLANZAPINE 5 MG TABLET] 30 tablet 1     Sig: TAKE 1 TABLET (5 MG) BY MOUTH NIGHTLY AS NEEDED (SALMA).       Antipsychotic Medications Failed - 6/2/2025 10:23 AM        Failed - Lipid panel on file within the past 12 months     No lab results found.            Passed - Most recent blood pressure under 140/90 in past 12 months        Passed - Patient is 12 years of age or older        Passed - Heart Rate on file within past 12 months     Pulse Readings from Last 3 Encounters:   05/01/25 89   04/03/25 74   03/16/25 91               Passed - A1c or Glucose on file in past 12 months     Recent Labs   Lab Test 01/23/25  1054   GLC 84       Please review patients last 3 weights. If a weight gain of >10 lbs exists, you may refill " the prescription once after instructing the patient to schedule an appointment within the next 30 days.    Wt Readings from Last 3 Encounters:   05/01/25 89 kg (196 lb 4.8 oz)   04/03/25 93.7 kg (206 lb 9.6 oz)   03/16/25 93 kg (205 lb)             Passed - Medication is active on med list and the sig matches. RN to manually verify dose and sig if red X/fail.     If the protocol passes (green check), you do not need to verify med dose and sig.    A prescription matches if they are the same clinical intention.    For Example: once daily and every morning are the same.    The protocol can not identify upper and lower case letters as matching and will fail.     For Example: Take 1 tablet (50 mg) by mouth daily     TAKE 1 TABLET (50 MG) BY MOUTH DAILY    For all fails (red x), verify dose and sig.    If the refill does match what is on file, the RN can still proceed to approve the refill request.       If they do not match, route to the appropriate provider.             Passed - Recent (12 month) or future (90 days) visit with authorizing provider's specialty (provided they have been seen in the past 15 months)     The patient must have completed an in-person or virtual visit within the past 12 months or has a future visit scheduled within the next 90 days with the authorizing provider s specialty.  Urgent care and e-visits do not qualify as an office visit for this protocol.          Passed - Medication indicated for associated diagnosis     Medication is associated with one or more of the following diagnoses:             Agitation            Autistic disorder            Bipolar disorder            Chemotherapy-induced nausea and vomiting            Delirium            Depression            Schizophrenia             Mood disorder

## 2025-06-06 ENCOUNTER — VIRTUAL VISIT (OUTPATIENT)
Dept: PSYCHIATRY | Facility: CLINIC | Age: 20
End: 2025-06-06
Attending: SOCIAL WORKER
Payer: COMMERCIAL

## 2025-06-06 DIAGNOSIS — F31.9 BIPOLAR 1 DISORDER (H): Primary | ICD-10-CM

## 2025-06-06 PROCEDURE — 90837 PSYTX W PT 60 MINUTES: CPT | Mod: 95 | Performed by: SOCIAL WORKER

## 2025-06-06 NOTE — PROGRESS NOTES
[facility]  Psychiatry Clinic  STRIDE Program  Increasing Resiliency in Life aka IRL (individual therapy)  Clinician Contact & Psychotherapy Progress Note      Patient: Yoseph Grayson (2005), MRN: [id number]  Date: 6/06/25  Diagnosis: Bipolar I Disorder    Clinician: OMID Prieto  Attendees: Patient      Service Type:   Extended Session (60+ minutes): No  Interactive Complexity: No  Crisis: No    Length of Actual Contact: Start Time: 3:02; End Time: 3:55pm  In- Person at the [facility]: Yes    Data:  Progress on treatment objective & homework: New Objective established this session - ACTION (Actively working towards change); Intervened by reinforcing change plan / affirming steps taken      Medication utilization: No changes to current psychiatric medication(s) Yoseph reports taking medication as prescribed.    Current / Ongoing Stressors and Urgent Concerns: Finances, moving to [place] in June, interpersonal conflict    Intervention:  Motivational Interviewing- Expressed Empathy/Understanding, Supported Autonomy, Collaboration, Evocation, Open-ended questions, Reflections: simple and complex, and Reframe / Response to change talk- E - Evoked more info from patient about behavior change, A - Affirmed patient's thoughts, decisions, or attempts at behavior change, R - Reflected patient's change talk, and S - Summarized patient's change talk statements  Educational Teaching Strategies- Review of written material/education  Relate information to client's experience  Ask questions to check comprehension  Break down information into small chunks  Adopt client's language     Answers submitted by the patient for this visit:  Patient Health Questionnaire (Submitted on 5/20/2025)  If you checked off any problems, how difficult have these problems made it for you to do your work, take care of things at home, or get along with other people?: Somewhat  difficult  PHQ9 TOTAL SCORE: 6    Assessment/Progress Note:   Focus for today's session: Wellness Planning    Assessed symptom presence and potential triggers for the patient.  Yoseph reports that symptoms include which is D Our continued goal is improve coping skills to deal with stress and interpersonal relationships, provide supportive therapy, and psychoeducation.  Today, in our first session, Yoseph shared an      *Is staying home for another month of so. Parents convinced him for financial reasons, and to help with dogs and grandparents.   *Shared about a situation with a friend who has romantic feelings for him which has made him uncomfortable. Blocked her on all contacts.   *Started wellness planning - identified healthy eating, and coping with stress in social and romantic settings as areas to work on        Writer used a relational and interpersonal approach to explore feelings, motivations, and behavior. Writer offered support, feedback, validation, and reinforced use of skills taught in IRL from modalities including cognitive behavioral therapy, psycho education, and skills training. Promoted understanding of their experiences of psychosis and how it impacts them in important areas of their life and in recovery goals. Reflected on client's strengths and resiliency factors and facilitated discussion on how these can assist in symptom management, recovery, and well-being.    Overall Yoseph was cooperative, attentive, and engaged throughout the session.  Observed symptoms include: no unusual symptoms outside of baseline.  As of today's appt insight to their mental illness appears good.  Progress toward goal completion seems good.    Safety:   Risk status (Self / Other harm or suicidal ideation)   denies current fears or concerns for personal safety.   denies current or recent suicidal ideation or behaviors. - CSSRS completed below.    denies current or recent homicidal ideation or behaviors.   denies  current or recent self injurious behavior or ideation.   denies other safety concerns.   reports there has been no change in risk factors since their last session.     reports there has been no change in protective factors since their last session.     Recommended that patient call 911 or go to the local ED should there be a change in any of these risk factors    [place] Protocol Risk Identification:  1) Have you wished you were dead or wished you could go to sleep and not wake up? No  2) Have you actually had any thoughts about killing yourself? No  If YES to 2, answer questions 3, 4, 5, 6  If NO to 2, go directly to question 6  3) Have you thought about how you might do this? No  4) Have you had any intension of acting on these thoughts of killing yourself, as opposed to you have the thoughts but you definitely would not act on them? No  5) Have you started to work out or worked out the details of how to kill yourself? Do you intend to carry out this plan? No  Always Ask Question 6  6) Have you done anything, started to do anything, or prepared to do anything to end your life? No  Examples: collected pills, obtained a gun, gave away valuables, wrote a will or suicide note, held a gun but changed your mind, cut yourself, tried to hang yourself, etc.  RISK INDICATOR & ACTION BY CLINICIAN: Patient denied any current/recent/lifetime history of suicidal ideation and/or behaviors.  No safety plan indicated at this time.     Discussed overall safety plan should symptoms feel unmanageable or safety concerns become imminent. Yoseph was able to contract for safety.   Crisis Numbers: After hours:  [phone]   [facility] [phone]    Mental Status Exam:  Appearance: Appropriate   Eye Contact: Good   Psychomotor Behavior: Normal   Attitude: Cooperative  Interested Pleasant  Orientation: All  Speech Rate: Normal   Volume: Normal   Mood: Euphoric   Affect: Appropriate   Thought Content: Clear   Thought Form:  Coherent  Logical   Insight: Good     Plan/Referrals:   Yoseph is participating in coordinated speciality care via the First Episode of Bipolar - STRIDE Program within our clinic.  I will meet with Yoseph weekly  for Increasing Resiliency in Life aka IRL (individual therapy), therapy, and support aimed at maximizing Yoseph's opportunity for recovery from extreme symptoms states of psychosis, joanna, and/or depression.      Next topic to consider: Wellness Planning  Next session: 1 week    PROVIDER: Hanh Goyal [facility]      Additional screening measures (Complete every 90 days)     PHQ, TAURUS, PROMIS, MIRECC GAF, and IMR Scales (reviewed every 90 days)  PHQ9:       1/16/2025    10:12 AM 2/28/2025    12:12 PM 3/31/2025    11:49 AM 4/3/2025     2:15 PM 4/7/2025    11:48 AM 5/20/2025    11:04 AM 5/27/2025    10:50 AM   PHQ-9 SCORE   PHQ-9 Total Score MyChart 5 (Mild depression) 4 (Minimal depression) 3 (Minimal depression) 4 (Minimal depression) 1 (Minimal depression) 6 (Mild depression) 7 (Mild depression)   PHQ-9 Total Score 5  4  3  4  1  6  7        Patient-reported     GAD7:        No data to display              PROMIS 10-Global Health (only subscores and total score):       1/8/2025    12:30 PM 1/16/2025    10:14 AM 4/21/2025    11:59 AM 5/1/2025     2:58 PM   PROMIS-10 Scores Only   Global Mental Health Score 13  14  14  13    Global Physical Health Score 14  16  14  15    PROMIS TOTAL - SUBSCORES 27  30  28  28        Patient-reported     ______________________________________________________________________  Outpatient Treatment Plan Summary    Date of Initial session: 05/20/2025  Date of INTIAL Treatment Plan: 05/20/2025  Last Review/Update Date:  N/A    Today's Date: 6/06/25    Next 90-Day Review Due:  8/20/25    Treatment plan was initiated and completed at this visit.    Individual Resiliency Training Treatment Goals     Measurable Objectives Interventions Target Dates & Discharge Criteria  "  Individual s Objectives    1). Understand the impact of stress on Bipolar Disorder and develop a plan to manage stress.    2). Develop and maintain a consistent sleep routine.    3). Reduce intake of junk food    4). Develop wellness plan     5). Improve communication with parents, resulting in increased independence and feeling understood.    In Yoseph's own words: \"would like to eat less junk food\" and he has had \"more trouble sleeping than usual\". IRT/IRL   Psychotherapy  -Psychoeducation  -Motivation interviewing  -CBT  -Behavioral activation  -Family involvement during portions of sessions    Coordination with other Strengths Program Team Members:  - Supported Education and Employment  - Medication Management  - Family Education and Support  - Social Work Care Coordination  - Pharmacist  - Psychometrist Target date:   12-36 months from enrollment    Discharge criteria:  Marked and sustained symptom improvement     Yoseph demonstrates understanding of mental illness     Yoseph successfully implements strategies to cope with stressors and/or symptoms to mitigate risk for increase in symptom severity or relapse    Gains Made:  -NA       Patient has reviewed and agreed to the above plan.  See signed Acknowledgement of Current Treatment Plan attached to this visit in patient chart (via \"consents\" tab).    Hanh Goyal, [facility]  05/20/2025    "

## 2025-06-09 NOTE — PROGRESS NOTES
[facility]  Psychiatry Clinic  STRIDE Program  Increasing Resiliency in Life aka IRL (individual therapy)  Clinician Contact & Psychotherapy Progress Note      Patient: Yoseph Grayson (2005), MRN: [id number]  Date: 5/27/25  Diagnosis: Bipolar I Disorder    Clinician: OMID Prieto  Attendees: Patient      Service Type:   Extended Session (60+ minutes): No  Interactive Complexity: No  Crisis: No    Length of Actual Contact: Start Time: 11:00 am; End Time: 12:00 pm  In- Person at the [facility]: Yes    Data:  Progress on treatment objective & homework: New Objective established this session - ACTION (Actively working towards change); Intervened by reinforcing change plan / affirming steps taken      Medication utilization: No changes to current psychiatric medication(s) Yoseph reports taking medication as prescribed.    Current / Ongoing Stressors and Urgent Concerns: Finances, moving to [place], conflict with parents    Intervention:  Motivational Interviewing- Expressed Empathy/Understanding, Supported Autonomy, Collaboration, Evocation, Open-ended questions, Reflections: simple and complex, and Reframe / Response to change talk- E - Evoked more info from patient about behavior change, A - Affirmed patient's thoughts, decisions, or attempts at behavior change, R - Reflected patient's change talk, and S - Summarized patient's change talk statements  Educational Teaching Strategies- Review of written material/education  Relate information to client's experience  Ask questions to check comprehension  Break down information into small chunks  Adopt client's language     Answers submitted by the patient for this visit:  Patient Health Questionnaire (Submitted on 5/27/2025)  If you checked off any problems, how difficult have these problems made it for you to do your work, take care of things at home, or get along with other people?: Somewhat  "difficult  PHQ9 TOTAL SCORE: 7    Assessment/Progress Note:   Focus for today's session: Pros and Cons to moving    Assessed symptom presence and potential triggers for the patient.  Yoseph reports that he has no notable symptoms and is stable. Our continued goal is improve coping skills to deal with stress and interpersonal relationships, provide supportive therapy, and psychoeducation.  Today, [place] shared that he has been stressed out about his move lately. His parents have been pushing him to stay home and he wants to move to [place] next week. His parents cite a lack or a financial plan as a concern for moving up, in addition to their need for him to babysit his 18-year old brother. Explored the pros and cons of moving to [place] and staying back. Yoseph already quit his current job and has an interview in [place] in a few weeks, he is also eager to get up to [place] early to develop his social [place]. Discussed other options such as moving up in a month instead of now. Explored Yoseph's reluctance to talk to his parents about finances. He shared that his dad had significant financial distress when he was young. Yoseph also experienced shame in association with not \"earning\" enough money through appropriate behaviors in a class activity. Writer will share resources with [facility] for free financial counseling.     Writer used a relational and interpersonal approach to explore feelings, motivations, and behavior. Writer offered support, feedback, validation, and reinforced use of skills taught in IRL from modalities including cognitive behavioral therapy, psycho education, and skills training. Promoted understanding of their experiences of psychosis and how it impacts them in important areas of their life and in recovery goals. Reflected on client's strengths and resiliency factors and facilitated discussion on how these can assist in symptom management, recovery, and well-being.    Overall " Yoseph was cooperative, attentive, and engaged throughout the session.  Observed symptoms include: no unusual symptoms outside of baseline.  As of today's appt insight to their mental illness appears good.  Progress toward goal completion seems good.    Safety:   Risk status (Self / Other harm or suicidal ideation)   denies current fears or concerns for personal safety.   denies current or recent suicidal ideation or behaviors. - CSSRS completed below.    denies current or recent homicidal ideation or behaviors.   denies current or recent self injurious behavior or ideation.   denies other safety concerns.   reports there has been no change in risk factors since their last session.     reports there has been no change in protective factors since their last session.     Recommended that patient call 911 or go to the local ED should there be a change in any of these risk factors    [place] Protocol Risk Identification:  1) Have you wished you were dead or wished you could go to sleep and not wake up? No  2) Have you actually had any thoughts about killing yourself? No  If YES to 2, answer questions 3, 4, 5, 6  If NO to 2, go directly to question 6  3) Have you thought about how you might do this? No  4) Have you had any intension of acting on these thoughts of killing yourself, as opposed to you have the thoughts but you definitely would not act on them? No  5) Have you started to work out or worked out the details of how to kill yourself? Do you intend to carry out this plan? No  Always Ask Question 6  6) Have you done anything, started to do anything, or prepared to do anything to end your life? No  Examples: collected pills, obtained a gun, gave away valuables, wrote a will or suicide note, held a gun but changed your mind, cut yourself, tried to hang yourself, etc.  RISK INDICATOR & ACTION BY CLINICIAN: Patient denied any current/recent/lifetime history of suicidal ideation and/or behaviors.  No safety plan  indicated at this time.     Discussed overall safety plan should symptoms feel unmanageable or safety concerns become imminent. Yoseph was able to contract for safety.   Crisis Numbers: After hours:  [phone]   [facility] [phone]    Mental Status Exam:  Appearance: Appropriate   Eye Contact: Good   Psychomotor Behavior: Normal   Attitude: Cooperative  Interested Pleasant  Orientation: All  Speech Rate: Normal   Volume: Normal   Mood: Euphoric   Affect: Appropriate   Thought Content: Clear   Thought Form: Coherent  Logical   Insight: Good     Plan/Referrals:   Yoseph is participating in coordinated speciality care via the First Episode of Bipolar - STRIDE Program within our clinic.  I will meet with Yoseph weekly  for Increasing Resiliency in Life aka IRL (individual therapy), therapy, and support aimed at maximizing Yoseph's opportunity for recovery from extreme symptoms states of psychosis, joanna, and/or depression.      Next topic to consider: Wellness Planning  Next session: 1 week    PROVIDER: Hanh Goyal [facility]      Additional screening measures (Complete every 90 days)     PHQ, TAURUS, PROMIS, MIRECC GAF, and IMR Scales (reviewed every 90 days)  PHQ9:       1/16/2025    10:12 AM 2/28/2025    12:12 PM 3/31/2025    11:49 AM 4/3/2025     2:15 PM 4/7/2025    11:48 AM 5/20/2025    11:04 AM 5/27/2025    10:50 AM   PHQ-9 SCORE   PHQ-9 Total Score [facility] 5 (Mild depression) 4 (Minimal depression) 3 (Minimal depression) 4 (Minimal depression) 1 (Minimal depression) 6 (Mild depression) 7 (Mild depression)   PHQ-9 Total Score 5  4  3  4  1  6  7        Patient-reported     GAD7:        No data to display              PROMIS 10-Global Health (only subscores and total score):       1/8/2025    12:30 PM 1/16/2025    10:14 AM 4/21/2025    11:59 AM 5/1/2025     2:58 PM   PROMIS-10 Scores Only   Global Mental Health Score 13  14  14  13    Global Physical Health Score 14  16  14  15    PROMIS TOTAL  "- SUBSCORES 27  30  28  28        Patient-reported     ______________________________________________________________________  Outpatient Treatment Plan Summary    Date of Initial session: 05/20/2025  Date of INTIAL Treatment Plan: 05/20/2025  Last Review/Update Date:  N/A    Today's Date: 5/27/25    Next 90-Day Review Due:  8/20/25    Treatment plan was initiated and completed at this visit.    Individual Resiliency Training Treatment Goals     Measurable Objectives Interventions Target Dates & Discharge Criteria   Individual s Objectives    1). Understand the impact of stress on Bipolar Disorder and develop a plan to manage stress.    2). Develop and maintain a consistent sleep routine.    3). Reduce intake of junk food    4). Develop wellness plan     5). Improve communication with parents, resulting in increased independence and feeling understood.    In Yoseph's own words: \"would like to eat less junk food\" and he has had \"more trouble sleeping than usual\". IRT/IRL   Psychotherapy  -Psychoeducation  -Motivation interviewing  -CBT  -Behavioral activation  -Family involvement during portions of sessions    Coordination with other Strengths Program Team Members:  - Supported Education and Employment  - Medication Management  - Family Education and Support  - Social Work Care Coordination  - Pharmacist  - Psychometrist Target date:   12-36 months from enrollment    Discharge criteria:  Marked and sustained symptom improvement     Yoseph demonstrates understanding of mental illness     Yoseph successfully implements strategies to cope with stressors and/or symptoms to mitigate risk for increase in symptom severity or relapse    Gains Made:  -NA       Patient has reviewed and agreed to the above plan.  See signed Acknowledgement of Current Treatment Plan attached to this visit in patient chart (via \"consents\" tab).    Hanh Goyal, LICSW  05/20/2025    "

## 2025-06-10 ENCOUNTER — VIRTUAL VISIT (OUTPATIENT)
Dept: PSYCHIATRY | Facility: CLINIC | Age: 20
End: 2025-06-10
Attending: SOCIAL WORKER
Payer: COMMERCIAL

## 2025-06-10 DIAGNOSIS — F31.9 BIPOLAR 1 DISORDER (H): Primary | ICD-10-CM

## 2025-06-10 NOTE — PROGRESS NOTES
[facility]  Psychiatry Clinic  STRIDE Program  Increasing Resiliency in Life aka IRL (individual therapy)  Clinician Contact & Psychotherapy Progress Note      Patient: Yoseph Grayson (2005), MRN: [id number]  Date: 6/10/25  Diagnosis: Bipolar I Disorder    Clinician: OMID Prieto  Attendees: Patient      Service Type:   Extended Session (60+ minutes): No  Interactive Complexity: No  Crisis: No    Length of Actual Contact: Start Time:11:01 am; End Time: 11:49 pm  In- Person at the [place] [facility]: Yes    Data:  Progress on treatment objective & homework: New Objective established this session - ACTION (Actively working towards change); Intervened by reinforcing change plan / affirming steps taken      Medication utilization: No changes to current psychiatric medication(s) Yoseph reports taking medication as prescribed.    Current / Ongoing Stressors and Urgent Concerns: Finances, moving to [place] in June, interpersonal conflict    Intervention:  Motivational Interviewing- Expressed Empathy/Understanding, Supported Autonomy, Collaboration, Evocation, Open-ended questions, Reflections: simple and complex, and Reframe / Response to change talk- E - Evoked more info from patient about behavior change, A - Affirmed patient's thoughts, decisions, or attempts at behavior change, R - Reflected patient's change talk, and S - Summarized patient's change talk statements  Educational Teaching Strategies- Review of written material/education  Relate information to client's experience  Ask questions to check comprehension  Break down information into small chunks  Adopt client's language     Assessment/Progress Note:   Focus for today's session: Wellness Planning    Assessed symptom presence and potential triggers for the patient.  Yoseph reports that symptoms include trouble sleeping which is stable. Our continued goal is improve coping skills to deal with stress  and interpersonal relationships, provide supportive therapy, and psychoeducation.  Today, Yoseph shared that he moved some stuff to [place] over the weekend and interview and got a job at [employer] up there. Discussed happiness vs. Luz Elena due to concerns that Yoseph and his dad had over his recent excitement about moving. Confirmed that Yoseph is not experiencing luz elena/hypomania. While he had trouble getting to sleep last night, he was able to get 8 hours of sleep.     Today we continued developing Yoseph's Wellness Plan by finishing strategies to avoid symptoms returning and discussing some early warning signs of depression and luz elena.       Writer used a relational and interpersonal approach to explore feelings, motivations, and behavior. Writer offered support, feedback, validation, and reinforced use of skills taught in IRL from modalities including cognitive behavioral therapy, psycho education, and skills training. Promoted understanding of their experiences of psychosis and how it impacts them in important areas of their life and in recovery goals. Reflected on client's strengths and resiliency factors and facilitated discussion on how these can assist in symptom management, recovery, and well-being.    Overall Yoseph was cooperative, attentive, and engaged throughout the session.  Observed symptoms include: no unusual symptoms outside of baseline.  As of today's appt insight to their mental illness appears good.  Progress toward goal completion seems good.    Safety:   Risk status (Self / Other harm or suicidal ideation)   denies current fears or concerns for personal safety.   denies current or recent suicidal ideation or behaviors. - CSSRS completed below.    denies current or recent homicidal ideation or behaviors.   denies current or recent self injurious behavior or ideation.   denies other safety concerns.   reports there has been no change in risk factors since their last session.     reports  there has been no change in protective factors since their last session.     Recommended that patient call 911 or go to the local ED should there be a change in any of these risk factors    [place] Protocol Risk Identification:  1) Have you wished you were dead or wished you could go to sleep and not wake up? No  2) Have you actually had any thoughts about killing yourself? No  If YES to 2, answer questions 3, 4, 5, 6  If NO to 2, go directly to question 6  3) Have you thought about how you might do this? No  4) Have you had any intension of acting on these thoughts of killing yourself, as opposed to you have the thoughts but you definitely would not act on them? No  5) Have you started to work out or worked out the details of how to kill yourself? Do you intend to carry out this plan? No  Always Ask Question 6  6) Have you done anything, started to do anything, or prepared to do anything to end your life? No  Examples: collected pills, obtained a gun, gave away valuables, wrote a will or suicide note, held a gun but changed your mind, cut yourself, tried to hang yourself, etc.  RISK INDICATOR & ACTION BY CLINICIAN: Patient denied any current/recent/lifetime history of suicidal ideation and/or behaviors.  No safety plan indicated at this time.     Discussed overall safety plan should symptoms feel unmanageable or safety concerns become imminent. Yoseph was able to contract for safety.   Crisis Numbers: After hours:  [phone]   [facility] [phone]    Mental Status Exam:  Appearance: Appropriate   Eye Contact: Good   Psychomotor Behavior: Normal   Attitude: Cooperative  Interested Pleasant  Orientation: All  Speech Rate: Normal   Volume: Normal   Mood: Euphoric   Affect: Appropriate   Thought Content: Clear   Thought Form: Coherent  Logical   Insight: Good     Plan/Referrals:   Yoseph is participating in coordinated speciality care via the First Episode of Bipolar - STRIDE Program within our  clinic.  I will meet with Yoseph weekly  for Increasing Resiliency in Life aka IRL (individual therapy), therapy, and support aimed at maximizing Yoseph's opportunity for recovery from extreme symptoms states of psychosis, joanna, and/or depression.      Next topic to consider: Wellness Planning  Next session: 1 week    PROVIDER: OMID Prieto      Additional screening measures (Complete every 90 days)     PHQ, TAURUS, PROMIS, MIRECC GAF, and IMR Scales (reviewed every 90 days)  PHQ9:       1/16/2025    10:12 AM 2/28/2025    12:12 PM 3/31/2025    11:49 AM 4/3/2025     2:15 PM 4/7/2025    11:48 AM 5/20/2025    11:04 AM 5/27/2025    10:50 AM   PHQ-9 SCORE   PHQ-9 Total Score MyChart 5 (Mild depression) 4 (Minimal depression) 3 (Minimal depression) 4 (Minimal depression) 1 (Minimal depression) 6 (Mild depression) 7 (Mild depression)   PHQ-9 Total Score 5  4  3  4  1  6  7        Patient-reported     GAD7:        No data to display              PROMIS 10-Global Health (only subscores and total score):       1/8/2025    12:30 PM 1/16/2025    10:14 AM 4/21/2025    11:59 AM 5/1/2025     2:58 PM   PROMIS-10 Scores Only   Global Mental Health Score 13  14  14  13    Global Physical Health Score 14  16  14  15    PROMIS TOTAL - SUBSCORES 27  30  28  28        Patient-reported     ______________________________________________________________________  Outpatient Treatment Plan Summary    Date of Initial session: 05/20/2025  Date of INTIAL Treatment Plan: 05/20/2025  Last Review/Update Date:  N/A    Today's Date: 6/10/25    Next 90-Day Review Due:  8/20/25    Treatment plan was initiated and completed at this visit.    Individual Resiliency Training Treatment Goals     Measurable Objectives Interventions Target Dates & Discharge Criteria   Individual s Objectives    1). Understand the impact of stress on Bipolar Disorder and develop a plan to manage stress.    2). Develop and maintain a consistent sleep  "routine.    3). Reduce intake of junk food    4). Develop wellness plan     5). Improve communication with parents, resulting in increased independence and feeling understood.    In Yoseph's own words: \"would like to eat less junk food\" and he has had \"more trouble sleeping than usual\". IRT/IRL   Psychotherapy  -Psychoeducation  -Motivation interviewing  -CBT  -Behavioral activation  -Family involvement during portions of sessions    Coordination with other Strengths Program Team Members:  - Supported Education and Employment  - Medication Management  - Family Education and Support  - Social Work Care Coordination  - Pharmacist  - Psychometrist Target date:   12-36 months from enrollment    Discharge criteria:  Marked and sustained symptom improvement     Yoseph demonstrates understanding of mental illness     Yoseph successfully implements strategies to cope with stressors and/or symptoms to mitigate risk for increase in symptom severity or relapse    Gains Made:  -NA       Patient has reviewed and agreed to the above plan.  See signed Acknowledgement of Current Treatment Plan attached to this visit in patient chart (via \"consents\" tab).    Hanh Goyal, [facility]  05/20/2025    "

## 2025-06-17 DIAGNOSIS — F31.9 BIPOLAR 1 DISORDER (H): ICD-10-CM

## 2025-06-19 RX ORDER — LITHIUM CARBONATE 150 MG/1
CAPSULE ORAL
Qty: 30 CAPSULE | Refills: 2 | Status: SHIPPED | OUTPATIENT
Start: 2025-06-19

## 2025-06-19 NOTE — TELEPHONE ENCOUNTER
"LOV:   5/29/2025  [facility]  Mayela Martin APRN CNP     RTC: 4 weeks or sooner if needed   No shows: 0  Cancellations: 6/17  FOV:    6/26/2025 2:00 PM (60 min)  [place]   Arrive by:  1:45 PM   [facility] RETURN   URPSY ([facility])   Mayela Martin APRN CNP     ------------------------------  Last Script Details:  lithium (ESKALITH) 150 MG capsule 30 capsule 0 5/1/2025 -- No   Sig - Route: Take 1 capsule (150 mg) by mouth at bedtime. Take with three 300 mg tablets for a total bedtime dose of 1050 mg. - Oral     ------------------------------  Last visit note:   \"Continue Lithium 1050 mg at bedtime\"    Lapse in medication adherence greater than 3 days?:  No    Refill Decision:      [x]Medication unable to be refilled by RN due to criteria not met as indicated below:   Compliance - lapse in therapy/gap in refills; No Shows; Cancellations        Request from pharmacy:  Requested Prescriptions   Pending Prescriptions Disp Refills    lithium (ESKALITH) 150 MG capsule [Pharmacy Med Name: LITHIUM CARBONATE 150 MG CAP] 90 capsule 1     Sig: TAKE 1 CAP BY MOUTH AT BEDTIME. TAKE WITH THREE 300MG TABLETS FOR A TOTAL BEDTIME DOSE OF 1050MG.       There is no refill protocol information for this order                "

## 2025-06-24 ENCOUNTER — VIRTUAL VISIT (OUTPATIENT)
Dept: PSYCHIATRY | Facility: CLINIC | Age: 20
End: 2025-06-24
Attending: SOCIAL WORKER
Payer: COMMERCIAL

## 2025-06-24 DIAGNOSIS — F31.9 BIPOLAR 1 DISORDER (H): Primary | ICD-10-CM

## 2025-06-24 PROCEDURE — 90837 PSYTX W PT 60 MINUTES: CPT | Mod: 95 | Performed by: SOCIAL WORKER

## 2025-06-24 NOTE — PROGRESS NOTES
[facility]  Psychiatry Clinic  STRIDE Program  Increasing Resiliency in Life aka IRL (individual therapy)  Clinician Contact & Psychotherapy Progress Note      Patient: Yoseph Grayson (2005), MRN: [id number]  Date: 6/24/25  Diagnosis: Bipolar I Disorder    Clinician: OMID Prieto  Attendees: Patient      Service Type:   Extended Session (60+ minutes): No  Interactive Complexity: No  Crisis: No    Length of Actual Contact: Start Time:11:03 am; End Time: 11:59 am  In- Person at the [place] [facility]: No    Video- Visit Details:   Telemedicine Visit: The patient's condition can be safely assessed and treated via synchronous audio and visual telemedicine encounter.  Reason for Video Visit: Patient has requested telehealth visit  Originating location (patient location):  [facility]   Location- Patient's home  Distant Site (provider location): HIPAA compliant location - Provider Remote Setting- Home Office  Platform used for video visit:  Secure real time interactive audio and visual telecommunication system via [employer]    Data:  Progress on treatment objective & homework: New Objective established this session - ACTION (Actively working towards change); Intervened by reinforcing change plan / affirming steps taken      Medication utilization: No changes to current psychiatric medication(s) Yoseph reports taking medication as prescribed.    Current / Ongoing Stressors and Urgent Concerns: Finances, moving to [place] in June, interpersonal conflict    Intervention:  Motivational Interviewing- Expressed Empathy/Understanding, Supported Autonomy, Collaboration, Evocation, Open-ended questions, Reflections: simple and complex, and Reframe / Response to change talk- E - Evoked more info from patient about behavior change, A - Affirmed patient's thoughts, decisions, or attempts at behavior change, R - Reflected patient's change talk, and S - Summarized patient's change  "talk statements  Educational Teaching Strategies- Review of written material/education  Relate information to client's experience  Ask questions to check comprehension  Break down information into small chunks  Adopt client's language     Assessment/Progress Note:   Focus for today's session: Wellness Planning    Assessed symptom presence and potential triggers for the patient.  Yoseph reports no symptoms lately and improved sleep. Our continued goal is improve coping skills to deal with stress and interpersonal relationships, provide supportive therapy, and psychoeducation.  Today, Yoseph is at his parents house for the week while they are in [place]. He's watching over the dog and checking in on grandparents. He has deleted all the \"fun\" apps on his phone to avoid overstimulation, which has been going well. He was feeling \"amped up\" a lot and is now sleeping better. He started working at [employer] in [place] which \"is a job\". It's not great but it's fine. He enjoyed being back in [place] - \"it feels like home\". He'll move up there permanently next week. Explored anxiety that Yoseph has around deciding whether to hang with friends or spend time alone. Discussed options for deciding what to do and to ease anxiety. Yoseph also mentioned that he read his journal from when he was manic. It was hard to read but he was finally ready to read it. Emphasized how far he has come and the hard work he put in to get to this place.     Today we continued developing Yoseph's Wellness Plan by identifying Yoseph's early warning signs of depression and joanna.     Writer used a relational and interpersonal approach to explore feelings, motivations, and behavior. Writer offered support, feedback, validation, and reinforced use of skills taught in IRL from modalities including cognitive behavioral therapy, psycho education, and skills training. Promoted understanding of their experiences of psychosis and how it impacts them in " important areas of their life and in recovery goals. Reflected on client's strengths and resiliency factors and facilitated discussion on how these can assist in symptom management, recovery, and well-being.    Overall Yoseph was cooperative, attentive, and engaged throughout the session.  Observed symptoms include: no unusual symptoms outside of baseline.  As of today's appt insight to their mental illness appears good.  Progress toward goal completion seems good.    Safety:   Risk status (Self / Other harm or suicidal ideation)   denies current fears or concerns for personal safety.   denies current or recent suicidal ideation or behaviors. - CSSRS completed below.    denies current or recent homicidal ideation or behaviors.   denies current or recent self injurious behavior or ideation.   denies other safety concerns.   reports there has been no change in risk factors since their last session.     reports there has been no change in protective factors since their last session.     Recommended that patient call 911 or go to the local ED should there be a change in any of these risk factors    [place] Protocol Risk Identification:  1) Have you wished you were dead or wished you could go to sleep and not wake up? No  2) Have you actually had any thoughts about killing yourself? No  If YES to 2, answer questions 3, 4, 5, 6  If NO to 2, go directly to question 6  3) Have you thought about how you might do this? No  4) Have you had any intension of acting on these thoughts of killing yourself, as opposed to you have the thoughts but you definitely would not act on them? No  5) Have you started to work out or worked out the details of how to kill yourself? Do you intend to carry out this plan? No  Always Ask Question 6  6) Have you done anything, started to do anything, or prepared to do anything to end your life? No  Examples: collected pills, obtained a gun, gave away valuables, wrote a will or suicide note, held a  gun but changed your mind, cut yourself, tried to hang yourself, etc.  RISK INDICATOR & ACTION BY CLINICIAN: Patient denied any current/recent/lifetime history of suicidal ideation and/or behaviors.  No safety plan indicated at this time.     Discussed overall safety plan should symptoms feel unmanageable or safety concerns become imminent. Yoseph was able to contract for safety.   Crisis Numbers: After hours:  [phone]   [facility] [phone]    Mental Status Exam:  Appearance: Appropriate   Eye Contact: Good   Psychomotor Behavior: Normal   Attitude: Cooperative  Interested Pleasant  Orientation: All  Speech Rate: Normal   Volume: Normal   Mood: Euphoric   Affect: Appropriate   Thought Content: Clear   Thought Form: Coherent  Logical   Insight: Good     Plan/Referrals:   Yoseph is participating in coordinated speciality care via the First Episode of Bipolar - STRIDE Program within our clinic.  I will meet with Yoseph weekly  for Increasing Resiliency in Life aka IRL (individual therapy), therapy, and support aimed at maximizing Yoseph's opportunity for recovery from extreme symptoms states of psychosis, luz elena, and/or depression.      Next topic to consider: Wellness Planning  Next session: 1 week    PROVIDER: Hanh Goyal [facility]      Additional screening measures      Patient Luz Elena Questionnaire-9 (PMQ-9) - 06/24/2025    Over the past week, how often have you    Had little or no sleep, and still felt energized? Score: 0 - not at all  Felt easily irritated? Score: 1 - several days  Felt overactive? Score: 1 - several days  Acted impulsively or done things without thinking about consequences? Score: 1 - several days  Felt sped up or restless? Score: 0 - not at all  Been easily distracted? Score: 0 - not at all  Felt pressure to keep talking or been told by someone you are more talkative? Score: 0 - not at all  Felt argumentative? Score: 1 - several days  Had racing thoughts? Score: 0 - not  "at all    RISK INDICATOR AND ACTION BY CLINICIAN: LOW (0-9): ACTION Complete/Review/Update Risk Management Plan        PHQ, TAURUS, PROMIS, MIRECC GAF, and IMR Scales (reviewed every 90 days)  PHQ9:       1/16/2025    10:12 AM 2/28/2025    12:12 PM 3/31/2025    11:49 AM 4/3/2025     2:15 PM 4/7/2025    11:48 AM 5/20/2025    11:04 AM 5/27/2025    10:50 AM   PHQ-9 SCORE   PHQ-9 Total Score MyChart 5 (Mild depression) 4 (Minimal depression) 3 (Minimal depression) 4 (Minimal depression) 1 (Minimal depression) 6 (Mild depression) 7 (Mild depression)   PHQ-9 Total Score 5  4  3  4  1  6  7        Patient-reported     GAD7:        No data to display              PROMIS 10-Global Health (only subscores and total score):       1/8/2025    12:30 PM 1/16/2025    10:14 AM 4/21/2025    11:59 AM 5/1/2025     2:58 PM   PROMIS-10 Scores Only   Global Mental Health Score 13  14  14  13    Global Physical Health Score 14  16  14  15    PROMIS TOTAL - SUBSCORES 27  30  28  28        Patient-reported     ______________________________________________________________________  Outpatient Treatment Plan Summary    Date of Initial session: 05/20/2025  Date of INTIAL Treatment Plan: 05/20/2025  Last Review/Update Date:  N/A    Today's Date: 6/24/25    Next 90-Day Review Due:  8/20/25    Treatment plan was initiated and completed at this visit.    Individual Resiliency Training Treatment Goals     Measurable Objectives Interventions Target Dates & Discharge Criteria   Individual s Objectives    1). Understand the impact of stress on Bipolar Disorder and develop a plan to manage stress.    2). Develop and maintain a consistent sleep routine.    3). Reduce intake of junk food    4). Develop wellness plan     5). Improve communication with parents, resulting in increased independence and feeling understood.    In Yoseph's own words: \"would like to eat less junk food\" and he has had \"more trouble sleeping than usual\". IRT/IRL " "  Psychotherapy  -Psychoeducation  -Motivation interviewing  -CBT  -Behavioral activation  -Family involvement during portions of sessions    Coordination with other Strengths Program Team Members:  - Supported Education and Employment  - Medication Management  - Family Education and Support  - Social Work Care Coordination  - Pharmacist  - Psychometrist Target date:   12-36 months from enrollment    Discharge criteria:  Marked and sustained symptom improvement     Yoseph demonstrates understanding of mental illness     Yoseph successfully implements strategies to cope with stressors and/or symptoms to mitigate risk for increase in symptom severity or relapse    Gains Made:  -NA       Patient has reviewed and agreed to the above plan.  See signed Acknowledgement of Current Treatment Plan attached to this visit in patient chart (via \"consents\" tab).    Hanh Goyal, [facility]  05/20/2025    "

## 2025-06-26 ENCOUNTER — OFFICE VISIT (OUTPATIENT)
Dept: PSYCHIATRY | Facility: CLINIC | Age: 20
End: 2025-06-26
Attending: NURSE PRACTITIONER
Payer: COMMERCIAL

## 2025-06-26 VITALS
WEIGHT: 188.2 LBS | BODY MASS INDEX: 26.25 KG/M2 | DIASTOLIC BLOOD PRESSURE: 87 MMHG | TEMPERATURE: 98.2 F | HEART RATE: 66 BPM | SYSTOLIC BLOOD PRESSURE: 130 MMHG

## 2025-06-26 DIAGNOSIS — Z79.899 HIGH RISK MEDICATIONS (NOT ANTICOAGULANTS) LONG-TERM USE: ICD-10-CM

## 2025-06-26 DIAGNOSIS — F90.9 ATTENTION DEFICIT HYPERACTIVITY DISORDER (ADHD), UNSPECIFIED ADHD TYPE: ICD-10-CM

## 2025-06-26 DIAGNOSIS — F31.9 BIPOLAR I DISORDER (H): Primary | ICD-10-CM

## 2025-06-26 PROBLEM — F31.12 BIPOLAR AFFECTIVE DISORDER, CURRENTLY MANIC, MODERATE (H): Status: RESOLVED | Noted: 2024-05-03 | Resolved: 2025-06-26

## 2025-06-26 PROCEDURE — 99214 OFFICE O/P EST MOD 30 MIN: CPT | Performed by: NURSE PRACTITIONER

## 2025-06-26 RX ORDER — ATOMOXETINE 18 MG/1
18 CAPSULE ORAL DAILY
Qty: 30 CAPSULE | Refills: 2 | Status: SHIPPED | OUTPATIENT
Start: 2025-06-26 | End: 2025-06-26

## 2025-06-26 ASSESSMENT — PAIN SCALES - GENERAL: PAINLEVEL_OUTOF10: NO PAIN (0)

## 2025-06-26 NOTE — PATIENT INSTRUCTIONS
[facility]  [email or web address]

## 2025-06-26 NOTE — PROGRESS NOTES
[facility]  Psychiatry Clinic  PSYCHIATRY CLINIC FOLLOW UP VISIT     CARE TEAM:  PCP- Specialists, [facility]   Yoseph is a 19 year old who prefers the name Yoseph and uses pronouns he, they.     DIAGNOSES, ASSESSMENT & PLAN                                                                                          Diagnostic Impressions  Bipolar 1 Disorder with psychosis, mre depressed  ADHD    Yoseph is a 19 year old male who reports a psychiatric history significant for multiple episodes of depressions with onset at approx age 12-13, and a first episode of joanna in May 2024 with inpatient admission at [facility].  There is a family history of depression and suicide.  Manic episode occurred in the context of recently starting zoloft + intermittent cannabis use.  He was stabilized on lithium and olanzapine.  Following resolution of joanna he experienced an episode of depression which improved with Wellbutrin.    -Today, he reports mood is stable overall and denies persistent depression or joanna symptoms.  We recently evaluated ADHD symptoms using guided by the DIVA interview.  He endorsed 9/9 questions for inattention in adulthood (7/9 childhood) and 9/9 hyperactivity symptoms in both adulthood and childhood.  He describes inattention symptoms as mild overall, with more prominent hyperactivity symptoms.  Collateral information from parents may be helpful to confirm diagnosis of ADHD, but he does overall appear to meet criteria for this diagnosis, with subsequent impairments noted in work/school, relationships, and self-esteem/confidence.  We discussed treatment options today as well as risks in the context of bipolar disorder.  He would like to start a trial of Strattera.  Reviewed drug interaction with Wellbutrin, and due to last several BP readings being mildly elevated, he will first follow up with PCP to assess possible hypertension.  We will also  recheck lithium labs to assess any changes in kidney functioning.   -He will keep olanzapine PRN on hand for any s/s of joanna, and will reach out to the clinic or seek emergency services should these occur, reviewed early warning signs today. He denies SI/intent or plan and risk of harm to self or others is low.     Bipolar disorder  Continue Bupropion  mg daily   Continue Lithium 1050 mg at bedtime  Continue Olanzapine 5 mg daily PRN, on hand for a back-up medication    SGA induced weight gain  Continue metformin 500 mg daily BID    Insomnia  He will consider OTC melatonin, starting at 1 mg and taking 1.5-2 hrs prior to desired bedtime    Lithium, SGA monitoring  9/25/24: TSH/T4, lipid panel  1/23/25: BMP  2/3/25: Lithium level   Ordered today: BMP, lithium level, TSH    RTC: 4 weeks or sooner if needed   CRISIS Numbers:   Provided routinely in AVS        CHIEF CONCERN                                Bipolar disorder, medication SE    Interim History                                                   -Yoseph is a 19 year old who recently enrolled in the [facility] early bipolar disorder program.  DA with me was on 1/16.  He was diagnosed with bipolar disorder during an inpatient admission from [facility], where he was admitted from 5/2 - 5/9/24.  -Last seen by me on 5/29/25 at which time no medication changes were made and we completed a diagnotic interview focused on ADHD symptoms using the DIVA.   He endorsed 9/9 questions for inattention in adulthood (7/9 childhood) and 9/9 hyperactivity symptoms in both adulthood and childhood.  He describes inattention symptoms as mild overall, with more prominent hyperactivity symptoms.  These symptoms to occur outside of mood episodes and have been noted by others, especially parents and friends, but not as much by employers because he has learned to compensate for them in work settings.  He has recently been noticing ADHD symptoms of being easily distracted  which impacts his ability to complete tasks, as well as impulsivity.   -Mood has been good, feeling better than he is in awhile.  Denies anhedonia.  Has difficulty falling asleep, taking 1-1.5 hrs to fall asleep.  He will then sleep 7-8 hrs.  He has always had some difficulty with sleep onset and tends to be a night owl, falling asleep around 130 AM.  We discussed several behavioral strategies today including reducing or eliminating caffeine, reducing stimulating activity 2 hrs before bed, getting daytime sun exposure.  He has not tried melatonin or other OTC sleep aids and is interested in melatonin.  Discussed appropriate use of this, starting with 1 mg and taking it 1.5-2 hrs prior to desired bedtime.   -Has been struggling somewhat with identifying when when he experiencing normal good moods  and productive days vs warning signs of joanna. We discussed ways to distinguish these, including being aware of patterns, intensity and duration of symptoms, whether symptoms occur in isolation or several symptoms of (hypo)joanna occur together, and changes in energy/sleep patterns.   -He is moving to [place] and has been thinking about crisis planning. Has been reflecting on symptom patterns associated with past depressions and his manic episode.  One thing he noticed was increased rumination on/preoccupation with relationships in his life.  He also refects on increased energy, disrupted sleep, reduced cognitive flexibility, grandiosity,   -He denies symptoms of joanna or psychosis today. Denies death ideation or SI    Medication SE:  Weight gain of 40 lbs on olanzapine; Baseline weight is 170-180 lbs, currently 188 lbs.  Appetite is improved with metformin + discontinuation of olanzapine, has noticed sensation of being full has returned.  Sedation as above. Denies N/V/D or constipation.  Denies increase in thirst +  urination frequency.  Drinks 96 oz daily. Denies tremor, cognitive SE, sexual SE.      /87   Pulse 66    Temp 98.2  F (36.8  C) (Oral)   Wt 85.4 kg (188 lb 3.2 oz)   BMI 26.25 kg/m      Discussed BP today and since 3/6, with multiple readings in the low 130s/low80s.  Advised PCP follow up    Current psychiatric medications  Bupropion  mg daily   Lithium 1050 mg at bedtime  Metformin 500 mg daily BID   Olanzapine 5 mg at bedtime PRN - backup medication     Recent Substance Use:    Alcohol - 1x per month, 4 drinks per occasion  Cannabis - no recent use    SOCIAL and FAMILY HISTORY   (no change today)                                              per pt report         As documented in 12/3 note by Hanh Wilson, reviewed and updated as needed today.   Family Hx:  Mother eating disorder, anxiety and depression  reports history of completed suicides. Uncle completed suicide.    Social Hx:    Living situation: Yoseph currently lives in [place] with parents. Yoseph reports home life is going well.  Housing Type: house with parents  Guns, weapons, or other means to harm oneself in the home?  Unknown  Relationships: Significant relationships include Parents and friend Brigido.        Education: Yoseph's highest level of education is high school graduate. Was going to school for Music Education but switched to Elementary Education. Educational goals include finishing elementary education degree and returning to [employer]  Occupation: .  Occupational goals include to get an additional job and to eventually go back to school for elementary education.  Spiritual considerations: Patient does not identify with [place] community.  Is spiritual but is not as into it as he used to be.   Cultural influences: Yoseph describes their race as white and [place]. Yoseph's primary spoken language is English. Yoseph identifies their sexual orientation as questioning, and their gender as male. Yoseph prefers he/they pronouns.   Current Stressors: Living at home with parents, feelings of loneliness and depression.    Strengths & Opportunities:  Hobbies and enjoyable activities include Reading and Listening to Music. Spending time outside and playing video games. Likes to read comic books. Self identified strengths are good with people and social. Exercise and nutrition habits include worries about gaining weight from the Wellbutrin.  Coping mechanisms include Meditation, Therapy, and Family.   Legal Hx: Yes: got caught with an edbile and had to do a class online    Trauma and/or Abuse Hx: There are no indications or report of: significant losses, trauma, abuse or neglect. Issues of possible neglect are not present. Identifies the hospital as a traumatic experience.   Hx: No    PAST PSYCH and SUBSTANCE USE HISTORY   (no change today)                   -Remembers first feeling depressed in the 7th grade.  Recalls approx 3 episodes of depression per year, lasting approx 1 month.   Felt he had some depression at baseline and then outside factors would make it worse.  Describes these episodes as low mood, anhedonia, negative thinking about himself (feeling he was a bad person, something wrong with him), low energy, hypersomnia (12 hrs per day).  He has experienced SI in the past with depression episodes.  Denies ever experiencing suicide intent or plan.  Was started on prozac in 2020, which helped prevent more significant depressions, but did not help with baseline depression and also made him feel numb.  He took prozac until some time in 2023, does not recall the dose.  Was prescribed by his pediatrician.  After coming off the prozac his mood got worse.  He had two more episodes of depressions in the context of interpersonal stressors (difficult roommate situation and a break up).  He sought care at the [facility] clinic and was started on Zoloft in April 2023.  Highest dose was 25-50 mg.  During this period he was using cannabis (delta-8 gummies) once every few weeks, and alcohol once every month. Highest use of cannabis  was in the summer of 2023, daily use, but when he started the semester again he reduced use.  Denies any impact on mood or psychosis symptoms with cannabis use.   -He reports within 3-4 wks of starting zoloft in April 2023, he began to experience an increase in energy and a decrease in sleep (3 hrs or less per night, sometimes not sleeping at all).  Mood was elevated.  One night he took a cannabis edible, and the next morning he tarted to experince symptoms of psychosis, including AH (characterized by positive conversations with people he knows), unusual beliefs that he thought he had superpowers (could live forever, could fly), ability to telepathically communicate,  concerned the government would find him and want to do research on his new abilities.  Had been texting his ex girlfriend strange content, including that he had killed  someone, and she called the police.  He was  taken to the ED and then admitted to to inpatient psychiatry.  Symptoms began to stabilize with olanzapine, ativan, and lithium.  Was held on a 72 hr hold for some of his stay    Psychiatric Hospitalizations: One hospitalization in May 2024 at [facility] for first episode of joanna, 5/2 - 5/9/24  Commitment: No, Current Castillo order: No  History of Electroconvulsive Therapy (ECT) or Transcranial Magnetic Stimulation (TMS): No     Self-Injurious Behavior: Denies  Suicidal Ideation Hx: Yes - has history of ideation but has called suicide hotline  Suicide Attempt- #-:No  Violence/Aggression Hx: No    Past medication trials:  Prozac  Sertraline 25-50 mg, precipitated joanna  Ativan while admitted  Olanzapine - discontinued  standing dose on 4/3/25    Recent medication changes:  2/6/25 decrease olanzapine from 10 mg to 5 mg daily   3/6 - decreased olanzapine to 2.5  4/3 - discontinued olanzapine 2.5 mg, continued a PRN dose for breakthrough joanna symptoms     Outpatient Programs & Services [Psychotherapy, DBT, Day Treatment, Eating Disorder Tx, etc]:    Current:  Currently has an outpatient therapist. Cm Don as psychiatrist at [facility]     Past:  Has had previous therapists.Had previous hospitalization for manic episode.     Substance Use:  Past Use - Alcohol- intermittent  and Cannabis- monthly use since college, summer 2023 highest use at most days   No hx of substance use treatment     MEDICAL HISTORY     Patient Active Problem List   Diagnosis    Bipolar affective disorder, currently manic, moderate (H)    Overweight (BMI 25.0-29.9)     Concussion in 2016, swimming     ALLERGIES: Patient has no known allergies.     MEDICAL REVIEW OF SYSTEMS                                                                  A comprehensive review of systems was performed and is negative other than noted in the HPI.    CURRENT MEDS       Current Outpatient Medications   Medication Sig Dispense Refill    buPROPion (WELLBUTRIN XL) 150 MG 24 hr tablet Take 1 tablet (150 mg) by mouth every morning. 30 tablet 2    lithium (ESKALITH) 150 MG capsule TAKE 1 CAP BY MOUTH AT BEDTIME. TAKE WITH THREE 300MG TABLETS FOR A TOTAL BEDTIME DOSE OF 1050MG. 30 capsule 2    lithium 300 MG capsule Take 3 capsules (900 mg) by mouth at bedtime. 90 capsule 2    metFORMIN (GLUCOPHAGE) 500 MG tablet Take 1 tablet (500 mg) by mouth 2 times daily (with meals). 60 tablet 2    OLANZapine (ZYPREXA) 5 MG tablet Take 1 tablet (5 mg) by mouth nightly as needed (joanna). 30 tablet 0       VITALS                                                                                              /87   Pulse 66   Temp 98.2  F (36.8  C) (Oral)   Wt 85.4 kg (188 lb 3.2 oz)   BMI 26.25 kg/m      MENTAL STATUS EXAM                                                             Alertness: alert  and oriented  Appearance: casually groomed  Behavior/Demeanor: cooperative and pleasant, with good  eye contact   Speech: normal  Language: intact  Psychomotor: normal or unremarkable  Mood: description consistent with  euthymia  Affect: euthymic; congruent to: mood- yes, content- yes  Thought Process/Associations: unremarkable  Thought Content:  Reports none;  Denies suicidal & violent ideation and delusions  Perception:  Reports none;  Denies hallucinations  Insight: adequate  Judgment: adequate for safety  Cognition: attention span: intact  concentration: intact  recent memory: intact  remote memory: intact  fund of knowledge: appropriate  Gait and Station: unremarkable    LABS and DATA         4/7/2025    11:48 AM 5/20/2025    11:04 AM 5/27/2025    10:50 AM   PHQ   PHQ-9 Total Score 1  6  7    Q9: Thoughts of better off dead/self-harm past 2 weeks Not at all Not at all Not at all       Patient-reported       Recent Labs   Lab Test 01/23/25  1054   CR 1.01   GFRESTIMATED >90     No lab results found.      RISK STATEMENT for SAFETY   Yoseph Hameed did not appear to be an imminent safety risk to self or others.  Denies current SI, intent or plan.     TREATMENT RISK STATEMENT:  The risks, benefits, alternatives and potential adverse effects have been discussed and are understood by the pt. The pt understands the risks of using street drugs or alcohol. There are no medical contraindications, the pt agrees to treatment with the ability to do so. The pt knows to call the clinic for any problems or to access emergency care if needed.  Medical and substance use concerns are documented above.  Psychotropic drug interaction check was done, including changes made today.    PROVIDER:  JACK Casiano CNP      Psychiatry Individual Psychotherapy Note   Psychotherapy start time -2:00   Psychotherapy end time  2:38  Date treatment plan last reviewed with patient - 6/26/2025    Subjective: This supportive psychotherapy session addressed issues related to goals of therapy and current psychosocial stressors. Patient's reaction: Action in the context of mental status appropriate for ambulatory setting.    Interactive complexity  indicated? No  Plan: RTC in timeframe noted above  Psychotherapy services during this visit included myself and the patient.   Treatment Plan      SYMPTOMS; PROBLEMS   MEASURABLE GOALS;    FUNCTIONAL IMPROVEMENT / GAINS INTERVENTIONS DISCHARGE CRITERIA   Bipolar Disorder   Prevent joanna, depression  Learn skills for managing mood symptoms long term Supportive / psychodynamic marked symptom improvement        Level of Medical Decision Making:   - At least 1 chronic problem that is not stable  - Engaged in prescription drug management during visit (discussed any medication benefits, side effects, alternatives, etc.)         The longitudinal plan of care for the diagnosis(es)/condition(s) as documented were addressed during this visit. Due to the added complexity in care, I will continue to support Yoseph in the subsequent management and with ongoing continuity of care.

## 2025-06-26 NOTE — NURSING NOTE
Chief Complaint   Patient presents with    Recheck Medication     Bipolar 1 disorder      - August C., Visit Facilitator

## 2025-06-29 DIAGNOSIS — F31.9 BIPOLAR 1 DISORDER (H): ICD-10-CM

## 2025-06-30 ASSESSMENT — PATIENT HEALTH QUESTIONNAIRE - PHQ9
SUM OF ALL RESPONSES TO PHQ QUESTIONS 1-9: 4
10. IF YOU CHECKED OFF ANY PROBLEMS, HOW DIFFICULT HAVE THESE PROBLEMS MADE IT FOR YOU TO DO YOUR WORK, TAKE CARE OF THINGS AT HOME, OR GET ALONG WITH OTHER PEOPLE: SOMEWHAT DIFFICULT
SUM OF ALL RESPONSES TO PHQ QUESTIONS 1-9: 4

## 2025-07-01 ENCOUNTER — VIRTUAL VISIT (OUTPATIENT)
Dept: PSYCHIATRY | Facility: CLINIC | Age: 20
End: 2025-07-01
Attending: SOCIAL WORKER
Payer: COMMERCIAL

## 2025-07-01 DIAGNOSIS — F90.9 ATTENTION DEFICIT HYPERACTIVITY DISORDER (ADHD), UNSPECIFIED ADHD TYPE: ICD-10-CM

## 2025-07-01 DIAGNOSIS — F31.9 BIPOLAR 1 DISORDER (H): Primary | ICD-10-CM

## 2025-07-01 PROCEDURE — 90834 PSYTX W PT 45 MINUTES: CPT | Mod: 95 | Performed by: SOCIAL WORKER

## 2025-07-01 NOTE — PROGRESS NOTES
[facility]  Psychiatry Clinic  STRIDE Program  Increasing Resiliency in Life aka IRL (individual therapy)  Clinician Contact & Psychotherapy Progress Note      Patient: Yoseph Grayson (2005), MRN: [id number]  Date: 7/01/25  Diagnosis: Bipolar I Disorder    Clinician: OMID Prieto  Attendees: Patient      Service Type:   Extended Session (60+ minutes): No  Interactive Complexity: No  Crisis: No    Length of Actual Contact: Start Time:11:06 am; End Time: 11: am  In- Person at the [facility]: No    Video- Visit Details:   Telemedicine Visit: The patient's condition can be safely assessed and treated via synchronous audio and visual telemedicine encounter.  Reason for Video Visit: Patient has requested telehealth visit  Originating location (patient location):  [facility]   Location- Patient's home  Distant Site (provider location): HIPAA compliant location - Provider Remote Setting- Home Office  Platform used for video visit:  Secure real time interactive audio and visual telecommunication system via [employer]    Data:  Progress on treatment objective & homework: New Objective established this session - ACTION (Actively working towards change); Intervened by reinforcing change plan / affirming steps taken      Medication utilization: No changes to current psychiatric medication(s) Yoseph reports taking medication as prescribed.    Current / Ongoing Stressors and Urgent Concerns: Finances, recently moved to [place], interpersonal conflict    Intervention:  Motivational Interviewing- Expressed Empathy/Understanding, Supported Autonomy, Collaboration, Evocation, Open-ended questions, Reflections: simple and complex, and Reframe / Response to change talk- E - Evoked more info from patient about behavior change, A - Affirmed patient's thoughts, decisions, or attempts at behavior change, R - Reflected patient's change talk, and S - Summarized patient's change  talk statements  Educational Teaching Strategies- Review of written material/education  Relate information to client's experience  Ask questions to check comprehension  Break down information into small chunks  Adopt client's language     Answers submitted by the patient for this visit:  Patient Health Questionnaire (Submitted on 6/30/2025)  If you checked off any problems, how difficult have these problems made it for you to do your work, take care of things at home, or get along with other people?: Somewhat difficult  PHQ9 TOTAL SCORE: 4    Assessment/Progress Note:   Focus for today's session: Processing and reflection    Assessed symptom presence and potential triggers for the patient.  Yoseph reports no concerning symptoms outside of waking up in the middle of the night. Our continued goal is improve coping skills to deal with stress and interpersonal relationships, provide supportive therapy, and psychoeducation.  Today, Yoseph officially moved up to [place] after house sitting for his parents last week. He's been hanging out with a lot of old friends and new. He reflected on hanging out with a larger group of people that he has over the past year. He was ready to go sleep by 10:30pm and was exhausted. He is having a hard time relaxing and always feels like he needs to be doing something. He feels like this is a problem that went away living at home but has come back. He's anxious about not fulfilling his responsibilities. Explored using cognitive restructuring and using evidence to challenge negative thoughts. Discussed ways that Yoseph can relax such as listening to music, watching TV, reading, going for walks. Discussed his recent diagnosis of ADHD which surprised him. He reports that he started taking melatonin to help sleep under the direction of provider Una Martin. He's been falling asleep earlier but is waking up a few times throughout the night. Writer encouraged Yoseph to send a MyChart to  [facility] inquiring about dosage, etc. Yoseph shared that he is now noticing ADHD symptoms and reflecting on struggles in school.     Writer used a relational and interpersonal approach to explore feelings, motivations, and behavior. Writer offered support, feedback, validation, and reinforced use of skills taught in IRL from modalities including cognitive behavioral therapy, psycho education, and skills training. Promoted understanding of their experiences of psychosis and how it impacts them in important areas of their life and in recovery goals. Reflected on client's strengths and resiliency factors and facilitated discussion on how these can assist in symptom management, recovery, and well-being.    Overall Yoseph was cooperative, attentive, and engaged throughout the session.  Observed symptoms include: no unusual symptoms outside of baseline.  As of today's appt insight to their mental illness appears good.  Progress toward goal completion seems good.    Safety:   Risk status (Self / Other harm or suicidal ideation)   denies current fears or concerns for personal safety.   denies current or recent suicidal ideation or behaviors. - CSSRS completed below.    denies current or recent homicidal ideation or behaviors.   denies current or recent self injurious behavior or ideation.   denies other safety concerns.   reports there has been no change in risk factors since their last session.     reports there has been no change in protective factors since their last session.     Recommended that patient call 911 or go to the local ED should there be a change in any of these risk factors    [place] Protocol Risk Identification:  1) Have you wished you were dead or wished you could go to sleep and not wake up? No  2) Have you actually had any thoughts about killing yourself? No  If YES to 2, answer questions 3, 4, 5, 6  If NO to 2, go directly to question 6  3) Have you thought about how you might do this? No  4) Have  you had any intension of acting on these thoughts of killing yourself, as opposed to you have the thoughts but you definitely would not act on them? No  5) Have you started to work out or worked out the details of how to kill yourself? Do you intend to carry out this plan? No  Always Ask Question 6  6) Have you done anything, started to do anything, or prepared to do anything to end your life? No  Examples: collected pills, obtained a gun, gave away valuables, wrote a will or suicide note, held a gun but changed your mind, cut yourself, tried to hang yourself, etc.  RISK INDICATOR & ACTION BY CLINICIAN: Patient denied any current/recent/lifetime history of suicidal ideation and/or behaviors.  No safety plan indicated at this time.     Discussed overall safety plan should symptoms feel unmanageable or safety concerns become imminent. Yoseph was able to contract for safety.   Crisis Numbers: After hours:  [phone]   [facility] [phone]    Mental Status Exam:  Appearance: Appropriate   Eye Contact: Good   Psychomotor Behavior: Normal   Attitude: Cooperative  Interested Pleasant  Orientation: All  Speech Rate: Normal   Volume: Normal   Mood: Euphoric   Affect: Appropriate   Thought Content: Clear   Thought Form: Coherent  Logical   Insight: Good     Plan/Referrals:   Yoseph is participating in coordinated speciality care via the First Episode of Bipolar - STRIDE Program within our clinic.  I will meet with Yoseph weekly  for Increasing Resiliency in Life aka IRL (individual therapy), therapy, and support aimed at maximizing Yoseph's opportunity for recovery from extreme symptoms states of psychosis, luz elena, and/or depression.      Next topic to consider: Wellness Planning  Next session: 1 week    PROVIDER: Hanh Goyal [facility]      Additional screening measures      Patient Luz Elena Questionnaire-9 (PMQ-9) - 06/24/2025    Over the past week, how often have you    Had little or no sleep, and still  felt energized? Score: 0 - not at all  Felt easily irritated? Score: 1 - several days  Felt overactive? Score: 1 - several days  Acted impulsively or done things without thinking about consequences? Score: 1 - several days  Felt sped up or restless? Score: 0 - not at all  Been easily distracted? Score: 0 - not at all  Felt pressure to keep talking or been told by someone you are more talkative? Score: 0 - not at all  Felt argumentative? Score: 1 - several days  Had racing thoughts? Score: 0 - not at all    RISK INDICATOR AND ACTION BY CLINICIAN: LOW (0-9): ACTION Complete/Review/Update Risk Management Plan        PHQ, TAURUS, PROMIS, MIRECC GAF, and IMR Scales (reviewed every 90 days)  PHQ9:       2/28/2025    12:12 PM 3/31/2025    11:49 AM 4/3/2025     2:15 PM 4/7/2025    11:48 AM 5/20/2025    11:04 AM 5/27/2025    10:50 AM 6/30/2025    11:27 PM   PHQ-9 SCORE   PHQ-9 Total Score MyChart 4 (Minimal depression) 3 (Minimal depression) 4 (Minimal depression) 1 (Minimal depression) 6 (Mild depression) 7 (Mild depression) 4 (Minimal depression)   PHQ-9 Total Score 4  3  4  1  6  7  4        Patient-reported     GAD7:        No data to display              PROMIS 10-Global Health (only subscores and total score):       1/8/2025    12:30 PM 1/16/2025    10:14 AM 4/21/2025    11:59 AM 5/1/2025     2:58 PM   PROMIS-10 Scores Only   Global Mental Health Score 13  14  14  13    Global Physical Health Score 14  16  14  15    PROMIS TOTAL - SUBSCORES 27  30  28  28        Patient-reported     ______________________________________________________________________  Outpatient Treatment Plan Summary    Date of Initial session: 05/20/2025  Date of INTIAL Treatment Plan: 05/20/2025  Last Review/Update Date:  N/A    Today's Date: 7/01/25    Next 90-Day Review Due:  8/20/25    Treatment plan was initiated and completed at this visit.    Individual Resiliency Training Treatment Goals     Measurable Objectives Interventions Target Dates &  "Discharge Criteria   Individual s Objectives    1). Understand the impact of stress on Bipolar Disorder and develop a plan to manage stress.    2). Develop and maintain a consistent sleep routine.    3). Reduce intake of junk food    4). Develop wellness plan     5). Improve communication with parents, resulting in increased independence and feeling understood.    In Yoseph's own words: \"would like to eat less junk food\" and he has had \"more trouble sleeping than usual\". IRT/IRL   Psychotherapy  -Psychoeducation  -Motivation interviewing  -CBT  -Behavioral activation  -Family involvement during portions of sessions    Coordination with other Strengths Program Team Members:  - Supported Education and Employment  - Medication Management  - Family Education and Support  - Social Work Care Coordination  - Pharmacist  - Psychometrist Target date:   12-36 months from enrollment    Discharge criteria:  Marked and sustained symptom improvement     Yoseph demonstrates understanding of mental illness     Yoseph successfully implements strategies to cope with stressors and/or symptoms to mitigate risk for increase in symptom severity or relapse    Gains Made:  -NA       Patient has reviewed and agreed to the above plan.  See signed Acknowledgement of Current Treatment Plan attached to this visit in patient chart (via \"consents\" tab).    Hanh Goyal, [facility]  05/20/2025    "

## 2025-07-02 ENCOUNTER — VIRTUAL VISIT (OUTPATIENT)
Dept: FAMILY MEDICINE | Facility: CLINIC | Age: 20
End: 2025-07-02
Payer: COMMERCIAL

## 2025-07-02 DIAGNOSIS — R03.0 ELEVATED BLOOD PRESSURE READING WITHOUT DIAGNOSIS OF HYPERTENSION: ICD-10-CM

## 2025-07-02 DIAGNOSIS — F31.9 BIPOLAR I DISORDER (H): Primary | ICD-10-CM

## 2025-07-02 DIAGNOSIS — Z79.899 HIGH RISK MEDICATIONS (NOT ANTICOAGULANTS) LONG-TERM USE: ICD-10-CM

## 2025-07-02 DIAGNOSIS — F90.9 ATTENTION DEFICIT HYPERACTIVITY DISORDER (ADHD), UNSPECIFIED ADHD TYPE: ICD-10-CM

## 2025-07-02 PROCEDURE — 98006 SYNCH AUDIO-VIDEO EST MOD 30: CPT | Performed by: PHYSICIAN ASSISTANT

## 2025-07-02 RX ORDER — OLANZAPINE 5 MG/1
5 TABLET, FILM COATED ORAL
Qty: 30 TABLET | Refills: 0 | Status: SHIPPED | OUTPATIENT
Start: 2025-07-02

## 2025-07-02 NOTE — PROGRESS NOTES
Yoseph is a 19 year old who is being evaluated via a billable video visit.    How would you like to obtain your AVS? MyChart  If the video visit is dropped, the invitation should be resent by: Text to cell phone: [phone]  Will anyone else be joining your video visit? No      Assessment & Plan     Bipolar I disorder (H)  Attention deficit hyperactivity disorder (ADHD), unspecified ADHD type  High risk medications (not anticoagulants) long-term use  Elevated blood pressure reading without diagnosis of hypertension  Recent diagnosis of ADHD through psychiatrist and they are considering trial of Strattera. However, he has had mildly elevated blood pressure readings in the low 130s/low80s so psychiatrist wanted him to check with PCP before proceeding. He does not have a diagnosis of HTN. He has been monitoring his BP at home and it is usually around 135/60s-70s (highest home BP was 136/86). He feels he has been doing really well with wellbutrin and does not ant to stop medication. Both wellbutrin and strattera could increase blood pressure so would need to monitor. There is an interaction between wellbutrin and atomexetine - plasma concentrations and pharmacologic effects of atomoxetine may be increased by strong CYP2D6 inhibitors. I think they could try strattera but would recommend starting with dose of 40 mg/day and closely monitoring symptoms, side effects, and blood pressure. Continue with 40 mg/day for at least 4 weeks and could consider increase at that time if tolerating medication, not having adverse BP effects, but inadequate response. He would continue to monitor home BP and would check BP with psychiatrist as well. Would need to consider medication changes if BP increases.    The longitudinal plan of care for the diagnosis(es)/condition(s) as documented were addressed during this visit. Due to the added complexity in care, I will continue to support Yoseph in the subsequent management and with  ongoing continuity of care.      Nilesh Hameed is a 19 year old, presenting for the following health issues:  Consult        7/2/2025     8:52 AM   Additional Questions   Roomed by MR   Accompanied by Self         7/2/2025     8:52 AM   Patient Reported Additional Medications   Patient reports taking the following new medications NA     History of Present Illness       Reason for visit:  ADHD meds and high blood pressure  Symptoms include:  ADHD symptoms and hig hblood pressure  Symptom intensity:  Mild  Symptom progression:  Staying the same  Had these symptoms before:  Yes   He is taking medications regularly.     Follows with psychiatry for bipolar I disorder  Currently taking lithium and wellbutrin XL (started wellbutrin in 9/2024)  Has rx for olanzapine but now prescribed for PRN use and he has not taken it for a while  Also on metformin per psychiatrist for metabolic side effects of antipsychotic  He has labs monitored by psychiatrist - normal TSH, lipids and glucose 9/2024.Normal BMP 1/23/25.    Recent diagnosis of ADHD through psychiatrist and they are considering trial of Strattera. However, he has had mildly elevated blood pressure readings in the low 130s/low80s so psychiatrist wanted him to check with PCP before proceeding. He does not have a diagnosis of HTN. He has been monitoring his BP at home and it is usually around 135/60s-70s (highest home BP was 136/86).     He feels he has been doing really well with wellbutrin and does not want to stop medication.    He is feeling well. No chest pain, shortness of breath, palpitations, dizziness, headaches.         Objective           Vitals:  No vitals were obtained today due to virtual visit.    Physical Exam   GENERAL: alert and no distress  EYES: Eyes grossly normal to inspection.   RESP: No audible wheeze, cough, or visible cyanosis.    SKIN: Visible skin clear.   NEURO: Cranial nerves grossly intact.  Mentation and speech appropriate for  age.  PSYCH: Appropriate affect, tone, and pace of words        Video-Visit Details    Type of service:  Video Visit   Originating Location (pt. Location): Home    Distant Location (provider location):  On-site  Platform used for Video Visit: Charissa  Signed Electronically by: Adrienne Monsivais PA-C

## 2025-07-02 NOTE — TELEPHONE ENCOUNTER
"LOV: 6/26/25  RTC: 4 WEEKS  No shows: 0  Cancellations: 0  FOV: 7/31/25  ------------------------------  Last Script Details:  OLANZapine (ZYPREXA) 5 MG tablet 30 tablet 0 6/2/2025     ------------------------------  Last visit note:   \"Continue Olanzapine 5 mg daily PRN, on hand for a back-up medication \"        Refill Decision:    [x]Medication refilled per  Medication Refill in Ambulatory Care  policy; Psych protocol.         Request from pharmacy:  Requested Prescriptions   Pending Prescriptions Disp Refills    OLANZapine (ZYPREXA) 5 MG tablet [Pharmacy Med Name: OLANZAPINE 5 MG TABLET] 30 tablet 0     Sig: TAKE 1 TABLET (5 MG) BY MOUTH NIGHTLY AS NEEDED (SALMA).       Antipsychotic Medications Failed - 7/2/2025  8:07 AM        Failed - Lipid panel on file within the past 12 months     No lab results found.            Passed - Most recent blood pressure under 140/90 in past 12 months        Passed - Patient is 12 years of age or older        Passed - Heart Rate on file within past 12 months     Pulse Readings from Last 3 Encounters:   06/26/25 66   05/01/25 89   04/03/25 74               Passed - A1c or Glucose on file in past 12 months     Recent Labs   Lab Test 01/23/25  1054   GLC 84       Please review patients last 3 weights. If a weight gain of >10 lbs exists, you may refill the prescription once after instructing the patient to schedule an appointment within the next 30 days.    Wt Readings from Last 3 Encounters:   06/26/25 85.4 kg (188 lb 3.2 oz)   05/01/25 89 kg (196 lb 4.8 oz)   04/03/25 93.7 kg (206 lb 9.6 oz)             Passed - Medication is active on med list and the sig matches. RN to manually verify dose and sig if red X/fail.     If the protocol passes (green check), you do not need to verify med dose and sig.    A prescription matches if they are the same clinical intention.    For Example: once daily and every morning are the same.    The protocol can not identify upper and lower case " letters as matching and will fail.     For Example: Take 1 tablet (50 mg) by mouth daily     TAKE 1 TABLET (50 MG) BY MOUTH DAILY    For all fails (red x), verify dose and sig.    If the refill does match what is on file, the RN can still proceed to approve the refill request.       If they do not match, route to the appropriate provider.             Passed - Recent (12 month) or future (90 days) visit with authorizing provider's specialty (provided they have been seen in the past 15 months)     The patient must have completed an in-person or virtual visit within the past 12 months or has a future visit scheduled within the next 90 days with the authorizing provider s specialty.  Urgent care and e-visits do not qualify as an office visit for this protocol.          Passed - Medication indicated for associated diagnosis     Medication is associated with one or more of the following diagnoses:             Agitation            Autistic disorder            Bipolar disorder            Chemotherapy-induced nausea and vomiting            Delirium            Depression            Schizophrenia             Mood disorder

## 2025-07-03 ENCOUNTER — MYC MEDICAL ADVICE (OUTPATIENT)
Dept: PSYCHIATRY | Facility: CLINIC | Age: 20
End: 2025-07-03
Payer: COMMERCIAL

## 2025-07-03 DIAGNOSIS — F90.9 ATTENTION DEFICIT HYPERACTIVITY DISORDER (ADHD), UNSPECIFIED ADHD TYPE: Primary | ICD-10-CM

## 2025-07-03 RX ORDER — ATOMOXETINE 25 MG/1
25 CAPSULE ORAL EVERY MORNING
Qty: 30 CAPSULE | Refills: 0 | Status: SHIPPED | OUTPATIENT
Start: 2025-07-03

## 2025-07-03 NOTE — TELEPHONE ENCOUNTER
Writer spoke with provider, Una Martin who agrees to start patient on atomoxetine 25 mg every morning. Request for writer to call patient to discuss. Starting at a conservative dose due to interaction between Wellbutrin and atomoxetine. Advise patient to monitor mood and sleep and stop the medication if he experiences symptoms of hypomania and contact the clinic.     Provider also requests that if patient is able, to monitor blood pressure at home weekly. Plan to keep in-person appointment on 7/31 and will check BP at that time as well.    Received verbal authorization from Una Martin to order atomoxetine 25 mg every morning - 30 capsules.    Follow Up:   Writer attempted to call patient to discuss the above. LVM requesting a call back at the main clinic number.     -Received call back from patient and writer reviewed the above with patient. Patient verbalized understanding. He will plan to check his BP weekly with his cuff at home. Writer also reviewed after hours clinic number with him if he has questions over the weekend.     Patient denied any questions about the plan, but states that he has had some curiosity about whether provider thinks atomoxetine will help with his overthinking/hyperfixation. He states he has been experiencing these things for years and wonders if they are more related to ADHD symptoms than anxiety.

## 2025-07-07 ENCOUNTER — APPOINTMENT (OUTPATIENT)
Dept: PSYCHIATRY | Facility: CLINIC | Age: 20
End: 2025-07-07
Attending: PSYCHOLOGIST
Payer: COMMERCIAL

## 2025-07-08 ENCOUNTER — VIRTUAL VISIT (OUTPATIENT)
Dept: PSYCHIATRY | Facility: CLINIC | Age: 20
End: 2025-07-08
Attending: SOCIAL WORKER
Payer: COMMERCIAL

## 2025-07-08 DIAGNOSIS — F90.9 ATTENTION DEFICIT HYPERACTIVITY DISORDER (ADHD), UNSPECIFIED ADHD TYPE: ICD-10-CM

## 2025-07-08 DIAGNOSIS — F31.9 BIPOLAR 1 DISORDER (H): Primary | ICD-10-CM

## 2025-07-08 PROCEDURE — 90834 PSYTX W PT 45 MINUTES: CPT | Mod: 95 | Performed by: SOCIAL WORKER

## 2025-07-08 ASSESSMENT — PATIENT HEALTH QUESTIONNAIRE - PHQ9
10. IF YOU CHECKED OFF ANY PROBLEMS, HOW DIFFICULT HAVE THESE PROBLEMS MADE IT FOR YOU TO DO YOUR WORK, TAKE CARE OF THINGS AT HOME, OR GET ALONG WITH OTHER PEOPLE: NOT DIFFICULT AT ALL
SUM OF ALL RESPONSES TO PHQ QUESTIONS 1-9: 3
SUM OF ALL RESPONSES TO PHQ QUESTIONS 1-9: 3

## 2025-07-08 NOTE — PROGRESS NOTES
[facility]  Psychiatry Clinic  STRIDE Program  Increasing Resiliency in Life aka IRL (individual therapy)  Clinician Contact & Psychotherapy Progress Note      Patient: Yoseph Grayson (2005), MRN: [id number]  Date: 7/08/25  Diagnosis: Bipolar I Disorder    Clinician: OMID Prieto  Attendees: Patient      Service Type:   Extended Session (60+ minutes): No  Interactive Complexity: No  Crisis: No    Length of Actual Contact: Start Time:11:14 am; End Time: 11:59 am  In- Person at the [facility]: No    Video- Visit Details:   Telemedicine Visit: The patient's condition can be safely assessed and treated via synchronous audio and visual telemedicine encounter.  Reason for Video Visit: Patient has requested telehealth visit  Originating location (patient location):  [facility]   Location- Patient's home  Distant Site (provider location): HIPAA compliant location - Provider Remote Setting- Home Office  Platform used for video visit:  Secure real time interactive audio and visual telecommunication system via [employer]    Data:  Progress on treatment objective & homework: New Objective established this session - ACTION (Actively working towards change); Intervened by reinforcing change plan / affirming steps taken      Medication utilization: No changes to current psychiatric medication(s) Yoseph reports taking medication as prescribed.    Current / Ongoing Stressors and Urgent Concerns: Finances, recently moved to [place], interpersonal conflict    Intervention:  Motivational Interviewing- Expressed Empathy/Understanding, Supported Autonomy, Collaboration, Evocation, Open-ended questions, Reflections: simple and complex, and Reframe / Response to change talk- E - Evoked more info from patient about behavior change, A - Affirmed patient's thoughts, decisions, or attempts at behavior change, R - Reflected patient's change talk, and S - Summarized patient's change  "talk statements  Educational Teaching Strategies- Review of written material/education  Relate information to client's experience  Ask questions to check comprehension  Break down information into small chunks  Adopt client's language     Answers submitted by the patient for this visit:  Patient Health Questionnaire (Submitted on 7/8/2025)  If you checked off any problems, how difficult have these problems made it for you to do your work, take care of things at home, or get along with other people?: Not difficult at all  PHQ9 TOTAL SCORE: 3      Assessment/Progress Note:   Focus for today's session: Dealing with negative feeligns    Assessed symptom presence and potential triggers for the patient.  Yoseph reports no symptoms outside of baseline. Our continued goal is improve coping skills to deal with stress and interpersonal relationships, provide supportive therapy, and psychoeducation.  Today, Yoseph shared that he has been really busy working 8-hour days at [employer]. He doesn't \"hate\" his job but it is exhausting. He'll give it a few more weeks before deciding if he should look for something else. Yoseph reports that his sleep has better since our last visit - he has been sleeping through the night. He does wish that he has friends outside of his roommates. He's looking forward to classes starting so he can meet more people. Writer emphasized that friendships take time to develop. Explored other ways to meet people, such as school clubs and activities, going to coffee shops. Yoseph expressed concern about social anxiety and being outside his comfort zone. Discussed how he has changed since freshman year when he was much more outgoing.     Black and white thinking, \"must\" should\" \"never\", catastrophizing, overestimation of risk, jumping to conclusion.     Writer used a relational and interpersonal approach to explore feelings, motivations, and behavior. Writer offered support, feedback, validation, and " reinforced use of skills taught in IRL from modalities including cognitive behavioral therapy, psycho education, and skills training. Promoted understanding of their experiences of psychosis and how it impacts them in important areas of their life and in recovery goals. Reflected on client's strengths and resiliency factors and facilitated discussion on how these can assist in symptom management, recovery, and well-being.    Overall Yoseph was cooperative, attentive, and engaged throughout the session.  Observed symptoms include: no unusual symptoms outside of baseline.  As of today's appt insight to their mental illness appears good.  Progress toward goal completion seems good.    Safety:   Risk status (Self / Other harm or suicidal ideation)   denies current fears or concerns for personal safety.   denies current or recent suicidal ideation or behaviors. - CSSRS completed below.    denies current or recent homicidal ideation or behaviors.   denies current or recent self injurious behavior or ideation.   denies other safety concerns.   reports there has been no change in risk factors since their last session.     reports there has been no change in protective factors since their last session.     Recommended that patient call 911 or go to the local ED should there be a change in any of these risk factors    Mental Status Exam:  Appearance: Appropriate   Eye Contact: Good   Psychomotor Behavior: Normal   Attitude: Cooperative  Interested Pleasant  Orientation: All  Speech Rate: Normal   Volume: Normal   Mood: Euphoric   Affect: Appropriate   Thought Content: Clear   Thought Form: Coherent  Logical   Insight: Good     Plan/Referrals:   Yoseph is participating in coordinated speciality care via the First Episode of Bipolar - STRIDE Program within our clinic.  I will meet with Yoseph weekly  for Increasing Resiliency in Life aka IRL (individual therapy), therapy, and support aimed at maximizing Yoseph's  opportunity for recovery from extreme symptoms states of psychosis, luz elena, and/or depression.      Next topic to consider: Wellness Planning  Next session: 1 week    PROVIDER: Hanh Goyal [facility]      Additional screening measures      Patient Luz Elena Questionnaire-9 (PMQ-9) - 06/24/2025    Over the past week, how often have you    Had little or no sleep, and still felt energized? Score: 0 - not at all  Felt easily irritated? Score: 1 - several days  Felt overactive? Score: 1 - several days  Acted impulsively or done things without thinking about consequences? Score: 1 - several days  Felt sped up or restless? Score: 0 - not at all  Been easily distracted? Score: 0 - not at all  Felt pressure to keep talking or been told by someone you are more talkative? Score: 0 - not at all  Felt argumentative? Score: 1 - several days  Had racing thoughts? Score: 0 - not at all    RISK INDICATOR AND ACTION BY CLINICIAN: LOW (0-9): ACTION Complete/Review/Update Risk Management Plan        PHQ, TAURUS, PROMIS, MIRECC GAF, and IMR Scales (reviewed every 90 days)  PHQ9:       3/31/2025    11:49 AM 4/3/2025     2:15 PM 4/7/2025    11:48 AM 5/20/2025    11:04 AM 5/27/2025    10:50 AM 6/30/2025    11:27 PM 7/8/2025    10:55 AM   PHQ-9 SCORE   PHQ-9 Total Score MyChart 3 (Minimal depression) 4 (Minimal depression) 1 (Minimal depression) 6 (Mild depression) 7 (Mild depression) 4 (Minimal depression) 3 (Minimal depression)   PHQ-9 Total Score 3  4  1  6  7  4  3        Patient-reported     GAD7:        No data to display              PROMIS 10-Global Health (only subscores and total score):       1/8/2025    12:30 PM 1/16/2025    10:14 AM 4/21/2025    11:59 AM 5/1/2025     2:58 PM   PROMIS-10 Scores Only   Global Mental Health Score 13  14  14  13    Global Physical Health Score 14  16  14  15    PROMIS TOTAL - SUBSCORES 27  30  28  28        Patient-reported  "    ______________________________________________________________________  Outpatient Treatment Plan Summary    Date of Initial session: 05/20/2025  Date of INTIAL Treatment Plan: 05/20/2025  Last Review/Update Date:  N/A    Today's Date: 7/08/25    Next 90-Day Review Due:  8/20/25    Treatment plan was initiated and completed at this visit.    Individual Resiliency Training Treatment Goals     Measurable Objectives Interventions Target Dates & Discharge Criteria   Individual s Objectives    1). Understand the impact of stress on Bipolar Disorder and develop a plan to manage stress.    2). Develop and maintain a consistent sleep routine.    3). Reduce intake of junk food    4). Develop wellness plan     5). Improve communication with parents, resulting in increased independence and feeling understood.    In Yoseph's own words: \"would like to eat less junk food\" and he has had \"more trouble sleeping than usual\". IRT/IRL   Psychotherapy  -Psychoeducation  -Motivation interviewing  -CBT  -Behavioral activation  -Family involvement during portions of sessions    Coordination with other Strengths Program Team Members:  - Supported Education and Employment  - Medication Management  - Family Education and Support  - Social Work Care Coordination  - Pharmacist  - Psychometrist Target date:   12-36 months from enrollment    Discharge criteria:  Marked and sustained symptom improvement     Yoseph demonstrates understanding of mental illness     Yoseph successfully implements strategies to cope with stressors and/or symptoms to mitigate risk for increase in symptom severity or relapse    Gains Made:  -NA       Patient has reviewed and agreed to the above plan.  See signed Acknowledgement of Current Treatment Plan attached to this visit in patient chart (via \"consents\" tab).    Hanh Goyal, LICSW  05/20/2025    " LICSW  05/20/2025

## 2025-07-17 ENCOUNTER — VIRTUAL VISIT (OUTPATIENT)
Dept: PSYCHIATRY | Facility: CLINIC | Age: 20
End: 2025-07-17
Attending: SOCIAL WORKER
Payer: COMMERCIAL

## 2025-07-17 DIAGNOSIS — F90.9 ATTENTION DEFICIT HYPERACTIVITY DISORDER (ADHD), UNSPECIFIED ADHD TYPE: ICD-10-CM

## 2025-07-17 DIAGNOSIS — F31.9 BIPOLAR 1 DISORDER (H): Primary | ICD-10-CM

## 2025-07-17 PROCEDURE — 90834 PSYTX W PT 45 MINUTES: CPT | Mod: 95 | Performed by: SOCIAL WORKER

## 2025-07-17 NOTE — PROGRESS NOTES
[facility]  Psychiatry Clinic  STRIDE Program  Increasing Resiliency in Life aka IRL (individual therapy)  Clinician Contact & Psychotherapy Progress Note      Patient: Yoseph Grayson (2005), MRN: [id number]  Date: 7/17/25  Diagnosis: Bipolar I Disorder    Clinician: OMID Prieto  Attendees: Patient      Service Type:   Extended Session (60+ minutes): No  Interactive Complexity: No  Crisis: No    Length of Actual Contact: Start Time: 10:06 am; End Time: 10:57 am  In- Person at the [place] [facility]: No    Video- Visit Details:   Telemedicine Visit: The patient's condition can be safely assessed and treated via synchronous audio and visual telemedicine encounter.  Reason for Video Visit: Patient has requested telehealth visit  Originating location (patient location):  [facility]   Location- Patient's home  Distant Site (provider location): HIPAA compliant location - Provider Remote Setting- Home Office  Platform used for video visit:  Secure real time interactive audio and visual telecommunication system via [employer]    Data:  Progress on treatment objective & homework: New Objective established this session - ACTION (Actively working towards change); Intervened by reinforcing change plan / affirming steps taken      Medication utilization: No changes to current psychiatric medication(s) Yoseph reports taking medication as prescribed.    Current / Ongoing Stressors and Urgent Concerns: Finances, recently moved to [place], interpersonal conflict    Intervention:  Motivational Interviewing- Expressed Empathy/Understanding, Supported Autonomy, Collaboration, Evocation, Open-ended questions, Reflections: simple and complex, and Reframe / Response to change talk- E - Evoked more info from patient about behavior change, A - Affirmed patient's thoughts, decisions, or attempts at behavior change, R - Reflected patient's change talk, and S - Summarized patient's  change talk statements  Educational Teaching Strategies- Review of written material/education  Relate information to client's experience  Ask questions to check comprehension  Break down information into small chunks  Adopt client's language     Answers submitted by the patient for this visit:  Patient Health Questionnaire (Submitted on 7/8/2025)  If you checked off any problems, how difficult have these problems made it for you to do your work, take care of things at home, or get along with other people?: Not difficult at all  PHQ9 TOTAL SCORE: 3    Assessment/Progress Note:   Focus for today's session: Dealing with negative feeligns    Assessed symptom presence and potential triggers for the patient.  Yoseph reports no symptoms outside of baseline. Our continued goal is improve coping skills to deal with stress and interpersonal relationships, provide supportive therapy, and psychoeducation.  Today, Yoseph shared     *Stomach upset with ADHD meds - will message Una.   *A good friend visited [place], went to a concert  *Dicussed a conflict with a female roommate.   *Since talking about cognitive distortions last week, Yoseph has been noticing a lot of rumination.   *Discussed how challenging these thoughts and practicing grounding.     *Using evidence to challenge thoughts and 12 questions    Writer used a relational and interpersonal approach to explore feelings, motivations, and behavior. Writer offered support, feedback, validation, and reinforced use of skills taught in IRL from modalities including cognitive behavioral therapy, psycho education, and skills training. Promoted understanding of their experiences of psychosis and how it impacts them in important areas of their life and in recovery goals. Reflected on client's strengths and resiliency factors and facilitated discussion on how these can assist in symptom management, recovery, and well-being.    Overall Yoseph was cooperative, attentive, and  engaged throughout the session.  Observed symptoms include: no unusual symptoms outside of baseline.  As of today's appt insight to their mental illness appears good.  Progress toward goal completion seems good.    Safety:   Risk status (Self / Other harm or suicidal ideation)   denies current fears or concerns for personal safety.   denies current or recent suicidal ideation or behaviors. - CSSRS completed below.    denies current or recent homicidal ideation or behaviors.   denies current or recent self injurious behavior or ideation.   denies other safety concerns.   reports there has been no change in risk factors since their last session.     reports there has been no change in protective factors since their last session.     Recommended that patient call 911 or go to the local ED should there be a change in any of these risk factors    Mental Status Exam:  Appearance: Appropriate   Eye Contact: Good   Psychomotor Behavior: Normal   Attitude: Cooperative  Interested Pleasant  Orientation: All  Speech Rate: Normal   Volume: Normal   Mood: Euphoric   Affect: Appropriate   Thought Content: Clear   Thought Form: Coherent  Logical   Insight: Good     Plan/Referrals:   Yoseph is participating in coordinated speciality care via the First Episode of Bipolar - STRIDE Program within our clinic.  I will meet with Yoseph weekly  for Increasing Resiliency in Life aka IRL (individual therapy), therapy, and support aimed at maximizing Yoseph's opportunity for recovery from extreme symptoms states of psychosis, luz elena, and/or depression.      Next topic to consider: Wellness Planning  Next session: 1 week    PROVIDER: Hanh Goyal [facility]      Additional screening measures      Patient Luz Elena Questionnaire-9 (PMQ-9) - 06/24/2025    Over the past week, how often have you    Had little or no sleep, and still felt energized? Score: 0 - not at all  Felt easily irritated? Score: 1 - several days  Felt overactive? Score: 1  - several days  Acted impulsively or done things without thinking about consequences? Score: 1 - several days  Felt sped up or restless? Score: 0 - not at all  Been easily distracted? Score: 0 - not at all  Felt pressure to keep talking or been told by someone you are more talkative? Score: 0 - not at all  Felt argumentative? Score: 1 - several days  Had racing thoughts? Score: 0 - not at all    RISK INDICATOR AND ACTION BY CLINICIAN: LOW (0-9): ACTION Complete/Review/Update Risk Management Plan        PHQ, TAURUS, PROMIS, MIRECC GAF, and IMR Scales (reviewed every 90 days)  PHQ9:       3/31/2025    11:49 AM 4/3/2025     2:15 PM 4/7/2025    11:48 AM 5/20/2025    11:04 AM 5/27/2025    10:50 AM 6/30/2025    11:27 PM 7/8/2025    10:55 AM   PHQ-9 SCORE   PHQ-9 Total Score MyChart 3 (Minimal depression) 4 (Minimal depression) 1 (Minimal depression) 6 (Mild depression) 7 (Mild depression) 4 (Minimal depression) 3 (Minimal depression)   PHQ-9 Total Score 3  4  1  6  7  4  3        Patient-reported     GAD7:        No data to display              PROMIS 10-Global Health (only subscores and total score):       1/8/2025    12:30 PM 1/16/2025    10:14 AM 4/21/2025    11:59 AM 5/1/2025     2:58 PM   PROMIS-10 Scores Only   Global Mental Health Score 13  14  14  13    Global Physical Health Score 14  16  14  15    PROMIS TOTAL - SUBSCORES 27  30  28  28        Patient-reported     ______________________________________________________________________  Outpatient Treatment Plan Summary    Date of Initial session: 05/20/2025  Date of INTIAL Treatment Plan: 05/20/2025  Last Review/Update Date:  N/A    Today's Date: 7/17/25    Next 90-Day Review Due:  8/20/25    Treatment plan was initiated and completed at this visit.    Individual Resiliency Training Treatment Goals     Measurable Objectives Interventions Target Dates & Discharge Criteria   Individual s Objectives    1). Understand the impact of stress on Bipolar Disorder and develop  "a plan to manage stress.    2). Develop and maintain a consistent sleep routine.    3). Reduce intake of junk food    4). Develop wellness plan     5). Improve communication with parents, resulting in increased independence and feeling understood.    In Yoseph's own words: \"would like to eat less junk food\" and he has had \"more trouble sleeping than usual\". IRT/IRL   Psychotherapy  -Psychoeducation  -Motivation interviewing  -CBT  -Behavioral activation  -Family involvement during portions of sessions    Coordination with other Strengths Program Team Members:  - Supported Education and Employment  - Medication Management  - Family Education and Support  - Social Work Care Coordination  - Pharmacist  - Psychometrist Target date:   12-36 months from enrollment    Discharge criteria:  Marked and sustained symptom improvement     Yoseph demonstrates understanding of mental illness     Yoseph successfully implements strategies to cope with stressors and/or symptoms to mitigate risk for increase in symptom severity or relapse    Gains Made:  -NA       Patient has reviewed and agreed to the above plan.  See signed Acknowledgement of Current Treatment Plan attached to this visit in patient chart (via \"consents\" tab).    Hanh Goyal, [facility]  05/20/2025    "

## 2025-07-21 DIAGNOSIS — F31.9 BIPOLAR 1 DISORDER (H): ICD-10-CM

## 2025-07-23 RX ORDER — LITHIUM CARBONATE 150 MG/1
CAPSULE ORAL
Qty: 90 CAPSULE | Refills: 0 | Status: SHIPPED | OUTPATIENT
Start: 2025-07-23

## 2025-07-23 NOTE — TELEPHONE ENCOUNTER
"      LOV:6/26/25  RTC: 4 weeks or sooner if needed   No shows: 0  Cancellations: 7/25/25  FOV: 7/31/25  ------------------------------  Last Script Details:  lithium (ESKALITH) 150 MG capsule 30 capsule 2 6/19/2025 -- No   Sig: TAKE 1 CAP BY MOUTH AT BEDTIME. TAKE WITH THREE 300MG TABLETS FOR A TOTAL BEDTIME DOSE OF 1050MG.   Sent to pharmacy as: Lithium Carbonate 150 MG Oral Capsule (ESKALITH)   Class: E-Prescribe   Order: [id number]     ------------------------------  Last visit note:   \"Bipolar disorder  Continue Bupropion  mg daily   Continue Lithium 1050 mg at bedtime  Continue Olanzapine 5 mg daily PRN, on hand for a back-up medication\"    Lapse in medication adherence greater than 3 days?:  Unknown    Refill Decision:      [x]Medication unable to be refilled by RN due to criteria not met as indicated below: last appt was canceled. Unable to refill per policy.       Request from pharmacy:  Requested Prescriptions   Pending Prescriptions Disp Refills    lithium (ESKALITH) 150 MG capsule [Pharmacy Med Name: LITHIUM CARBONATE 150 MG CAP] 90 capsule 1     Sig: TAKE 1 CAP BY MOUTH AT BEDTIME. TAKE WITH THREE 300MG TABLETS FOR A TOTAL BEDTIME DOSE OF 1050MG.       There is no refill protocol information for this order                "

## 2025-07-25 DIAGNOSIS — F90.9 ATTENTION DEFICIT HYPERACTIVITY DISORDER (ADHD), UNSPECIFIED ADHD TYPE: ICD-10-CM

## 2025-07-28 RX ORDER — ATOMOXETINE 25 MG/1
25 CAPSULE ORAL EVERY MORNING
Qty: 30 CAPSULE | Refills: 0 | Status: SHIPPED | OUTPATIENT
Start: 2025-07-28 | End: 2025-07-31

## 2025-07-28 NOTE — TELEPHONE ENCOUNTER
LOV: 6/26/2025  [facility]      RTC: 4wk  No shows: 0  Cancellations: 0  FOV: Selected Appointment   7/31/2025 2:00 PM (30 min)  [place]   Arrive by:  1:45 PM   [facility]   [facility] ([facility])   Veronica MayelaJACK Carty CNP     ------------------------------  Last Script Details:   Disp Refills Start End JORDY   atomoxetine (STRATTERA) 25 MG capsule 30 capsule 0 7/3/2025 -- No   Sig - Route: Take 1 capsule (25 mg) by mouth every morning. - Oral     ------------------------------      Lapse in medication adherence greater than 3 days?:  No    Refill Decision:    [x]Medication refilled per  Medication Refill in Ambulatory Care  policy; Psych protocol.   [] Supervision: no future appointment scheduled. Scheduling has been notified to contact the pt for appointment.         Request from pharmacy:  Requested Prescriptions   Pending Prescriptions Disp Refills    atomoxetine (STRATTERA) 25 MG capsule [Pharmacy Med Name: ATOMOXETINE HCL 25 MG CAPSULE] 90 capsule 1     Sig: TAKE 1 CAPSULE BY MOUTH EVERY MORNING.       Attention Deficit/Hyperactivity Disorder (ADHD) Agents Protocol Failed - 7/28/2025 11:42 AM        Failed - RN Chart Review: If there has been a dose change or started on the medication since the previous refill, do not authorize. Send refill request to the provider.     Please review patient refills to confirm   This refill request isn't the 1st refill following initial prescription   OR   This refill request is not the 1st refill following a dose change.  If either of the above 2 are TRUE, patient must have had a recent visit within the past 30 days with the authorizing provider or a provider within his/her clinic AND specialty.           Failed - Medication is active on med list and the sig matches. RN to manually verify dose and sig if red X/fail.     If the protocol passes (green check), you do not need to verify med dose and sig.    A prescription  matches if they are the same clinical intention.    For Example: once daily and every morning are the same.    The protocol can not identify upper and lower case letters as matching and will fail.     For Example: Take 1 tablet (50 mg) by mouth daily     TAKE 1 TABLET (50 MG) BY MOUTH DAILY    For all fails (red x), verify dose and sig.    If the refill does match what is on file, the RN can still proceed to approve the refill request.       If they do not match, route to the appropriate provider.             Passed - Most recent blood pressure under 140/90 in past 12 months     BP Readings from Last 3 Encounters:   06/26/25 130/87   05/01/25 132/71   04/03/25 130/75       No data recorded            Passed - Patient is age 6 or older        Passed - Recent (6 month) or future (90 days) visit with the authorizing provider's specialty (provided they have been seen in the past 9 months)     The patient must have completed an in-person or virtual visit within the past 6 months or has a future visit scheduled within the next 90 days with the authorizing provider s specialty.  Urgent care and e-visits do not quality as an office visit for this protocol.          Passed - Medicationindicatedfor associated diagnosis     The medication is prescribed for one or more of the following conditions:    Attention deficit hyperactive disorder   Attention deficit hyperactive disorder - Social phobia

## 2025-07-29 ENCOUNTER — VIRTUAL VISIT (OUTPATIENT)
Dept: PSYCHIATRY | Facility: CLINIC | Age: 20
End: 2025-07-29
Attending: SOCIAL WORKER
Payer: COMMERCIAL

## 2025-07-29 DIAGNOSIS — F90.9 ATTENTION DEFICIT HYPERACTIVITY DISORDER (ADHD), UNSPECIFIED ADHD TYPE: ICD-10-CM

## 2025-07-29 DIAGNOSIS — F31.9 BIPOLAR I DISORDER (H): Primary | ICD-10-CM

## 2025-07-29 PROCEDURE — 90832 PSYTX W PT 30 MINUTES: CPT | Mod: 95 | Performed by: SOCIAL WORKER

## 2025-07-31 ENCOUNTER — OFFICE VISIT (OUTPATIENT)
Dept: PSYCHIATRY | Facility: CLINIC | Age: 20
End: 2025-07-31
Attending: NURSE PRACTITIONER
Payer: COMMERCIAL

## 2025-07-31 ENCOUNTER — LAB (OUTPATIENT)
Dept: LAB | Facility: CLINIC | Age: 20
End: 2025-07-31
Payer: COMMERCIAL

## 2025-07-31 VITALS
WEIGHT: 178.8 LBS | HEART RATE: 94 BPM | BODY MASS INDEX: 24.94 KG/M2 | TEMPERATURE: 96.8 F | SYSTOLIC BLOOD PRESSURE: 129 MMHG | DIASTOLIC BLOOD PRESSURE: 82 MMHG

## 2025-07-31 DIAGNOSIS — F31.9 BIPOLAR I DISORDER (H): ICD-10-CM

## 2025-07-31 DIAGNOSIS — Z11.4 SCREENING FOR HIV (HUMAN IMMUNODEFICIENCY VIRUS): Primary | ICD-10-CM

## 2025-07-31 DIAGNOSIS — Z11.59 NEED FOR HEPATITIS C SCREENING TEST: ICD-10-CM

## 2025-07-31 DIAGNOSIS — F90.9 ATTENTION DEFICIT HYPERACTIVITY DISORDER (ADHD), UNSPECIFIED ADHD TYPE: ICD-10-CM

## 2025-07-31 DIAGNOSIS — F31.9 BIPOLAR 1 DISORDER (H): ICD-10-CM

## 2025-07-31 DIAGNOSIS — Z79.899 HIGH RISK MEDICATIONS (NOT ANTICOAGULANTS) LONG-TERM USE: ICD-10-CM

## 2025-07-31 LAB — LITHIUM SERPL-SCNC: 0.64 MMOL/L (ref 0.6–1.2)

## 2025-07-31 PROCEDURE — 99214 OFFICE O/P EST MOD 30 MIN: CPT | Performed by: NURSE PRACTITIONER

## 2025-07-31 RX ORDER — LITHIUM CARBONATE 150 MG/1
150 CAPSULE ORAL DAILY
Qty: 90 CAPSULE | Refills: 1 | Status: SHIPPED | OUTPATIENT
Start: 2025-07-31

## 2025-07-31 RX ORDER — LITHIUM CARBONATE 300 MG/1
900 CAPSULE ORAL AT BEDTIME
Qty: 90 CAPSULE | Refills: 2 | Status: SHIPPED | OUTPATIENT
Start: 2025-07-31

## 2025-07-31 RX ORDER — ATOMOXETINE 40 MG/1
40 CAPSULE ORAL EVERY MORNING
Qty: 30 CAPSULE | Refills: 1 | Status: SHIPPED | OUTPATIENT
Start: 2025-07-31

## 2025-07-31 RX ORDER — BUPROPION HYDROCHLORIDE 150 MG/1
150 TABLET ORAL EVERY MORNING
Qty: 30 TABLET | Refills: 2 | Status: SHIPPED | OUTPATIENT
Start: 2025-07-31

## 2025-07-31 ASSESSMENT — PAIN SCALES - GENERAL: PAINLEVEL_OUTOF10: MILD PAIN (3)

## 2025-07-31 NOTE — PROGRESS NOTES
[facility]  Psychiatry Clinic  PSYCHIATRY CLINIC FOLLOW UP VISIT     CARE TEAM:  PCP- Specialists, [facility], [place] [facility]   Yoseph is a 19 year old who prefers the name Yoseph and uses pronouns he, they.     DIAGNOSES, ASSESSMENT & PLAN                                                                                          Diagnostic Impressions  Bipolar 1 Disorder with psychosis, mre depressed  ADHD    Yoseph is a 19 year old male who reports a psychiatric history significant for multiple episodes of depressions with onset at approx age 12-13, and a first episode of joanna in May 2024 with inpatient admission at [facility].  There is a family history of depression and suicide.  Manic episode occurred in the context of recently starting zoloft + intermittent cannabis use.  He was stabilized on lithium and olanzapine.  Following resolution of joanna he experienced an episode of depression which improved with Wellbutrin.    -Today, he reports mood is stable overall and denies persistent depression or joanna symptoms.  We recently evaluated ADHD symptoms using guided by the DIVA interview.  He endorsed 9/9 questions for inattention in adulthood (7/9 childhood) and 9/9 hyperactivity symptoms in both adulthood and childhood.  He describes inattention symptoms as mild overall, with more prominent hyperactivity symptoms.  Collateral information from parents may be helpful to confirm diagnosis of ADHD, but he does overall appear to meet criteria for this diagnosis, with subsequent impairments noted in work/school, relationships, and self-esteem/confidence.  We recently start Strattera which he is tolerating, will increase dose today.  Reviewed drug interaction with Wellbutrin.  Has followed up with PCP re: blood pressure and has a home monitoring device which he will regularly check in the next 2 weeks.  Reviewed that he should present to the ED if he experiences elevated  readings accompanied by headache, dizziness, chest pain or shortness of breath.   -Has been successful with losing weight since discontinuing olanzapine.  He would like to reduce metformin today.   -He will keep olanzapine PRN on hand for any s/s of joanna, and will reach out to the clinic or seek emergency services should these occur, reviewed early warning signs today. He denies SI/intent or plan and risk of harm to self or others is low.     Bipolar disorder  Continue Bupropion  mg daily   Continue Lithium 1050 mg at bedtime  Continue Olanzapine 5 mg daily PRN, on hand for a back-up medication    SGA induced weight gain  Reduce metformin to 500 mg daily     Insomnia  Melatonin 2.5-3 mg,  1.5-2 hrs prior to desired bedtime    ADHD  Increase Strattera to 40 mg daily     Lithium, SGA monitoring  9/25/24: TSH/T4, lipid panel  1/23/25: BMP  2/3/25: Lithium level   BMP, lithium level, TSH in process    RTC: 4 weeks or sooner if needed   CRISIS Numbers:   Provided routinely in AVS        CHIEF CONCERN                                Bipolar disorder, medication SE    Interim History                                                   -Yoseph is a 19 year old who recently enrolled in the [facility] early bipolar disorder program.  DA with me was on 1/16.  He was diagnosed with bipolar disorder during an inpatient admission from [facility], where he was admitted from 5/2 - 5/9/24.  -Last seen by me on 6/26 at which time no medication changes were made.  Strattera was subsequently started on 7/3. He reports after he started Strattera he had some nausea, which improved with taking it with food.  He was monitoring his BP with a home cuff.  Has noticed some improvement in ADHD symptoms, mostly related to reduced ruminations around his tasks.  No other impacts noticed on ADHD symptoms.  The symptoms he is hoping to address the most is staying on task, time management.   -Has been taking melatonin nightly, 2.5-3  mg.  This has been helping fall asleep faster and stay asleep.  Is taking about 30 min to fall asleep and is sleeping 8-9 hrs. Is feeling rested during the day.  He has also some luck with reducing caffeine use.   -Mood has been stable. Denies persistent depression or anhedonia. Denies any concerns for joanna, psychosis.  Denies death ideation or SI.   -Is working at [employer], 30+  hrs per week. Plans to scale back on work when classes start late August. He is taking a full course load with a focus on elementary and special education.  He has about 4 yrs of his degree plan to complete because he is switching into education.   -regarding ADHD, on recent DIVA assessment,  he endorsed 9/9 questions for inattention in adulthood (7/9 childhood) and 9/9 hyperactivity symptoms in both adulthood and childhood.  He describes inattention symptoms as mild overall, with more prominent hyperactivity symptoms.  These symptoms to occur outside of mood episodes and have been noted by others, especially parents and friends, but not as much by employers because he has learned to compensate for them in work settings.  He has recently been noticing ADHD symptoms of being easily distracted which impacts his ability to complete tasks, as well as impulsivity.     Medication SE:  Weight gain of 40 lbs on olanzapine; Baseline weight is 170-180 lbs, currently 178 lbs.  Appetite is improved with metformin + discontinuation of olanzapine, has noticed sensation of being full has returned.  Sedation as above. Denies N/V/D or constipation.  Denies increase in thirst +  urination frequency.  Drinks 96 oz daily. Denies tremor, cognitive SE, sexual SE.      /82   Pulse 94   Temp 96.8  F (36  C) (Oral)   Wt 81.1 kg (178 lb 12.8 oz)   BMI 24.94 kg/m      Current psychiatric medications  Bupropion  mg daily   Lithium 1050 mg at bedtime  Strattera 25 mg daily     Metformin 500 mg daily BID   Olanzapine 5 mg at bedtime PRN - backup  medication     Recent Substance Use:    Alcohol - 1x per month, 4 drinks per occasion  Cannabis - no recent use    SOCIAL and FAMILY HISTORY   (no change today)                                              per pt report         As documented in 12/3 note by Hanh Wilson, reviewed and updated as needed today.   Family Hx:  Mother eating disorder, anxiety and depression  reports history of completed suicides. Uncle completed suicide.    Social Hx:    Living situation: Yoseph currently lives in [place] with parents. Yoseph reports home life is going well.  Housing Type: house with parents  Guns, weapons, or other means to harm oneself in the home?  Unknown  Relationships: Significant relationships include Parents and friend Brigido.        Education: Yoseph's highest level of education is high school graduate. Was going to school for Music Education but switched to Elementary Education. Educational goals include finishing elementary education degree and returning to [employer]  Occupation: .  Occupational goals include to get an additional job and to eventually go back to school for elementary education.  Spiritual considerations: Patient does not identify with [place] community.  Is spiritual but is not as into it as he used to be.   Cultural influences: Yoseph describes their race as white and [place]. Yoseph's primary spoken language is English. Yoseph identifies their sexual orientation as questioning, and their gender as male. Yoseph prefers he/they pronouns.   Current Stressors: Living at home with parents, feelings of loneliness and depression.   Strengths & Opportunities:  Hobbies and enjoyable activities include Reading and Listening to Music. Spending time outside and playing video games. Likes to read comic books. Self identified strengths are good with people and social. Exercise and nutrition habits include worries about gaining weight from the Wellbutrin.  Coping mechanisms include  Meditation, Therapy, and Family.   Legal Hx: Yes: got caught with an edbile and had to do a class online    Trauma and/or Abuse Hx: There are no indications or report of: significant losses, trauma, abuse or neglect. Issues of possible neglect are not present. Identifies the hospital as a traumatic experience.   Hx: No    PAST PSYCH and SUBSTANCE USE HISTORY   (no change today)                   -Remembers first feeling depressed in the 7th grade.  Recalls approx 3 episodes of depression per year, lasting approx 1 month.   Felt he had some depression at baseline and then outside factors would make it worse.  Describes these episodes as low mood, anhedonia, negative thinking about himself (feeling he was a bad person, something wrong with him), low energy, hypersomnia (12 hrs per day).  He has experienced SI in the past with depression episodes.  Denies ever experiencing suicide intent or plan.  Was started on prozac in 2020, which helped prevent more significant depressions, but did not help with baseline depression and also made him feel numb.  He took prozac until some time in 2023, does not recall the dose.  Was prescribed by his pediatrician.  After coming off the prozac his mood got worse.  He had two more episodes of depressions in the context of interpersonal stressors (difficult roommate situation and a break up).  He sought care at the [facility] and was started on Zoloft in April 2023.  Highest dose was 25-50 mg.  During this period he was using cannabis (delta-8 gummies) once every few weeks, and alcohol once every month. Highest use of cannabis was in the summer of 2023, daily use, but when he started the semester again he reduced use.  Denies any impact on mood or psychosis symptoms with cannabis use.   -He reports within 3-4 wks of starting zoloft in April 2023, he began to experience an increase in energy and a decrease in sleep (3 hrs or less per night, sometimes not sleeping at  all).  Mood was elevated.  One night he took a cannabis edible, and the next morning he tarted to experince symptoms of psychosis, including AH (characterized by positive conversations with people he knows), unusual beliefs that he thought he had superpowers (could live forever, could fly), ability to telepathically communicate,  concerned the government would find him and want to do research on his new abilities.  Had been texting his ex girlfriend strange content, including that he had killed  someone, and she called the police.  He was  taken to the ED and then admitted to to inpatient psychiatry.  Symptoms began to stabilize with olanzapine, ativan, and lithium.  Was held on a 72 hr hold for some of his stay    Psychiatric Hospitalizations: One hospitalization in May 2024 at [facility] for first episode of joanna, 5/2 - 5/9/24  Commitment: No, Current [place] order: No  History of Electroconvulsive Therapy (ECT) or Transcranial Magnetic Stimulation (TMS): No     Self-Injurious Behavior: Denies  Suicidal Ideation Hx: Yes - has history of ideation but has called suicide hotline  Suicide Attempt- #-:No  Violence/Aggression Hx: No    Past medication trials:  Prozac  Sertraline 25-50 mg, precipitated joanna  Ativan while admitted  Olanzapine - discontinued  standing dose on 4/3/25    Recent medication changes:  2/6/25 decrease olanzapine from 10 mg to 5 mg daily   3/6 - decreased olanzapine to 2.5  4/3 - discontinued olanzapine 2.5 mg, continued a PRN dose for breakthrough joanna symptoms  7/3/25: start [facility]      Outpatient Programs & Services [Psychotherapy, DBT, Day Treatment, Eating Disorder Tx, etc]:   Current:  Currently has an outpatient therapist. Cm Don as psychiatrist at [facility]     Past:  Has had previous therapists.Had previous hospitalization for manic episode.     Substance Use:  Past Use - Alcohol- intermittent  and Cannabis- monthly use since college, summer 2023 highest use at most days   No hx  of substance use treatment     MEDICAL HISTORY     Patient Active Problem List   Diagnosis    Overweight (BMI 25.0-29.9)    Bipolar I disorder (H)    Elevated blood pressure reading without diagnosis of hypertension    Attention deficit hyperactivity disorder (ADHD), unspecified ADHD type     Concussion in 2016, swimming     ALLERGIES: Patient has no known allergies.     MEDICAL REVIEW OF SYSTEMS                                                                  A comprehensive review of systems was performed and is negative other than noted in the HPI.    CURRENT MEDS       Current Outpatient Medications   Medication Sig Dispense Refill    atomoxetine (STRATTERA) 25 MG capsule Take 1 capsule (25 mg) by mouth every morning. 30 capsule 0    buPROPion (WELLBUTRIN XL) 150 MG 24 hr tablet Take 1 tablet (150 mg) by mouth every morning. 30 tablet 2    lithium (ESKALITH) 150 MG capsule TAKE 1 CAP BY MOUTH AT BEDTIME. TAKE WITH THREE 300MG TABLETS FOR A TOTAL BEDTIME DOSE OF 1050MG. 90 capsule 0    lithium 300 MG capsule Take 3 capsules (900 mg) by mouth at bedtime. 90 capsule 2    metFORMIN (GLUCOPHAGE) 500 MG tablet Take 1 tablet (500 mg) by mouth 2 times daily (with meals). 60 tablet 0    OLANZapine (ZYPREXA) 5 MG tablet Take 1 tablet (5 mg) by mouth nightly as needed (joanna). 30 tablet 0       VITALS                                                                                              /82   Pulse 94   Temp 96.8  F (36  C) (Oral)   Wt 81.1 kg (178 lb 12.8 oz)   BMI 24.94 kg/m      MENTAL STATUS EXAM                                                             Alertness: alert  and oriented  Appearance: casually groomed  Behavior/Demeanor: cooperative and pleasant, with good  eye contact   Speech: normal  Language: intact  Psychomotor: normal or unremarkable  Mood: description consistent with euthymia  Affect: euthymic; congruent to: mood- yes, content- yes  Thought Process/Associations:  unremarkable  Thought Content:  Reports none;  Denies suicidal & violent ideation and delusions  Perception:  Reports none;  Denies hallucinations  Insight: adequate  Judgment: adequate for safety  Cognition: attention span: intact  concentration: intact  recent memory: intact  remote memory: intact  fund of knowledge: appropriate  Gait and Station: unremarkable    LABS and DATA         5/27/2025    10:50 AM 6/30/2025    11:27 PM 7/8/2025    10:55 AM   PHQ   PHQ-9 Total Score 7  4  3    Q9: Thoughts of better off dead/self-harm past 2 weeks Not at all Not at all Not at all       Patient-reported       Recent Labs   Lab Test 01/23/25  1054   CR 1.01   GFRESTIMATED >90     No lab results found.      RISK STATEMENT for SAFETY   Yoseph Hameed did not appear to be an imminent safety risk to self or others.  Denies current SI, intent or plan.     TREATMENT RISK STATEMENT:  The risks, benefits, alternatives and potential adverse effects have been discussed and are understood by the pt. The pt understands the risks of using street drugs or alcohol. There are no medical contraindications, the pt agrees to treatment with the ability to do so. The pt knows to call the clinic for any problems or to access emergency care if needed.  Medical and substance use concerns are documented above.  Psychotropic drug interaction check was done, including changes made today.    PROVIDER:  JACK Casiano CNP      Psychiatry Individual Psychotherapy Note   Psychotherapy start time -2:05   Psychotherapy end time  2:25  Date treatment plan last reviewed with patient - 7/31/2025    Subjective: This supportive psychotherapy session addressed issues related to goals of therapy and current psychosocial stressors. Patient's reaction: Action in the context of mental status appropriate for ambulatory setting.    Interactive complexity indicated? No  Plan: RTC in timeframe noted above  Psychotherapy services during this visit included  myself and the patient.   Treatment Plan      SYMPTOMS; PROBLEMS   MEASURABLE GOALS;    FUNCTIONAL IMPROVEMENT / GAINS INTERVENTIONS DISCHARGE CRITERIA   Bipolar Disorder   Prevent joanna, depression  Learn skills for managing mood symptoms long term Supportive / psychodynamic marked symptom improvement        Level of Medical Decision Making:   - At least 1 chronic problem that is not stable  - Engaged in prescription drug management during visit (discussed any medication benefits, side effects, alternatives, etc.)         The longitudinal plan of care for the diagnosis(es)/condition(s) as documented were addressed during this visit. Due to the added complexity in care, I will continue to support Yoseph in the subsequent management and with ongoing continuity of care.

## 2025-08-24 DIAGNOSIS — R63.5 WEIGHT GAIN: ICD-10-CM

## 2025-08-24 DIAGNOSIS — F31.9 BIPOLAR 1 DISORDER (H): ICD-10-CM

## 2025-08-25 ENCOUNTER — TELEPHONE (OUTPATIENT)
Dept: PSYCHIATRY | Facility: CLINIC | Age: 20
End: 2025-08-25
Payer: COMMERCIAL

## 2025-08-26 RX ORDER — LITHIUM CARBONATE 300 MG/1
900 CAPSULE ORAL AT BEDTIME
Qty: 90 CAPSULE | Refills: 2 | OUTPATIENT
Start: 2025-08-26

## 2025-08-30 ENCOUNTER — TELEPHONE (OUTPATIENT)
Dept: PSYCHIATRY | Facility: CLINIC | Age: 20
End: 2025-08-30
Payer: COMMERCIAL

## 2025-08-30 ENCOUNTER — TELEPHONE (OUTPATIENT)
Dept: BEHAVIORAL HEALTH | Facility: CLINIC | Age: 20
End: 2025-08-30
Payer: COMMERCIAL

## 2025-09-03 ASSESSMENT — PATIENT HEALTH QUESTIONNAIRE - PHQ9
10. IF YOU CHECKED OFF ANY PROBLEMS, HOW DIFFICULT HAVE THESE PROBLEMS MADE IT FOR YOU TO DO YOUR WORK, TAKE CARE OF THINGS AT HOME, OR GET ALONG WITH OTHER PEOPLE: SOMEWHAT DIFFICULT
SUM OF ALL RESPONSES TO PHQ QUESTIONS 1-9: 7
SUM OF ALL RESPONSES TO PHQ QUESTIONS 1-9: 7

## 2025-09-04 ENCOUNTER — MYC MEDICAL ADVICE (OUTPATIENT)
Dept: PSYCHIATRY | Facility: CLINIC | Age: 20
End: 2025-09-04
Payer: COMMERCIAL

## 2025-09-04 ENCOUNTER — VIRTUAL VISIT (OUTPATIENT)
Dept: PSYCHIATRY | Facility: CLINIC | Age: 20
End: 2025-09-04
Attending: NURSE PRACTITIONER
Payer: COMMERCIAL

## 2025-09-04 DIAGNOSIS — F31.9 BIPOLAR 1 DISORDER (H): ICD-10-CM

## 2025-09-04 RX ORDER — LITHIUM CARBONATE 300 MG/1
1200 CAPSULE ORAL AT BEDTIME
Qty: 120 CAPSULE | Refills: 2 | Status: SHIPPED | OUTPATIENT
Start: 2025-09-04

## 2025-09-04 ASSESSMENT — PAIN SCALES - GENERAL: PAINLEVEL_OUTOF10: NO PAIN (0)
